# Patient Record
Sex: MALE | Race: WHITE | Employment: FULL TIME | URBAN - NONMETROPOLITAN AREA
[De-identification: names, ages, dates, MRNs, and addresses within clinical notes are randomized per-mention and may not be internally consistent; named-entity substitution may affect disease eponyms.]

---

## 2023-07-07 ENCOUNTER — HOSPITAL ENCOUNTER (INPATIENT)
Age: 28
LOS: 3 days | Discharge: HOME | DRG: 392 | End: 2023-07-10
Attending: EMERGENCY MEDICINE | Admitting: PHYSICIAN ASSISTANT
Payer: COMMERCIAL

## 2023-07-07 ENCOUNTER — APPOINTMENT (OUTPATIENT)
Dept: CT IMAGING | Age: 28
DRG: 392 | End: 2023-07-07
Payer: COMMERCIAL

## 2023-07-07 DIAGNOSIS — K57.92 ACUTE DIVERTICULITIS: Primary | ICD-10-CM

## 2023-07-07 PROBLEM — I10 PRIMARY HYPERTENSION: Status: ACTIVE | Noted: 2023-07-07

## 2023-07-07 PROBLEM — F41.9 ANXIETY: Status: ACTIVE | Noted: 2023-07-07

## 2023-07-07 PROBLEM — G90.A POSTURAL ORTHOSTATIC TACHYCARDIA SYNDROME (POTS): Status: ACTIVE | Noted: 2023-07-07

## 2023-07-07 PROBLEM — F43.10 POST TRAUMATIC STRESS DISORDER (PTSD): Status: ACTIVE | Noted: 2023-07-07

## 2023-07-07 LAB
ALBUMIN SERPL BCG-MCNC: 4.3 G/DL (ref 3.5–5.1)
ALP SERPL-CCNC: 66 U/L (ref 38–126)
ALT SERPL W/O P-5'-P-CCNC: 28 U/L (ref 11–66)
ANION GAP SERPL CALC-SCNC: 10 MEQ/L (ref 8–16)
AST SERPL-CCNC: 20 U/L (ref 5–40)
BASOPHILS ABSOLUTE: 0 THOU/MM3 (ref 0–0.1)
BASOPHILS NFR BLD AUTO: 0.4 %
BILIRUB CONJ SERPL-MCNC: < 0.2 MG/DL (ref 0–0.3)
BILIRUB SERPL-MCNC: 0.4 MG/DL (ref 0.3–1.2)
BILIRUB UR QL STRIP.AUTO: NEGATIVE
BUN SERPL-MCNC: 14 MG/DL (ref 7–22)
CALCIUM SERPL-MCNC: 9.7 MG/DL (ref 8.5–10.5)
CHARACTER UR: CLEAR
CHLORIDE SERPL-SCNC: 102 MEQ/L (ref 98–111)
CO2 SERPL-SCNC: 25 MEQ/L (ref 23–33)
COLOR: YELLOW
CREAT SERPL-MCNC: 0.8 MG/DL (ref 0.4–1.2)
CRP SERPL-MCNC: 2.04 MG/DL (ref 0–1)
DEPRECATED RDW RBC AUTO: 37.8 FL (ref 35–45)
EOSINOPHIL NFR BLD AUTO: 1 %
EOSINOPHILS ABSOLUTE: 0.1 THOU/MM3 (ref 0–0.4)
ERYTHROCYTE [DISTWIDTH] IN BLOOD BY AUTOMATED COUNT: 12.1 % (ref 11.5–14.5)
GFR SERPL CREATININE-BSD FRML MDRD: > 60 ML/MIN/1.73M2
GLUCOSE SERPL-MCNC: 95 MG/DL (ref 70–108)
GLUCOSE UR QL STRIP.AUTO: NEGATIVE MG/DL
HCT VFR BLD AUTO: 47.4 % (ref 42–52)
HGB BLD-MCNC: 16.2 GM/DL (ref 14–18)
HGB UR QL STRIP.AUTO: NEGATIVE
IMM GRANULOCYTES # BLD AUTO: 0.05 THOU/MM3 (ref 0–0.07)
IMM GRANULOCYTES NFR BLD AUTO: 0.4 %
KETONES UR QL STRIP.AUTO: NEGATIVE
LACTIC ACID, SEPSIS: 0.7 MMOL/L (ref 0.5–1.9)
LIPASE SERPL-CCNC: 20.3 U/L (ref 5.6–51.3)
LYMPHOCYTES ABSOLUTE: 2.2 THOU/MM3 (ref 1–4.8)
LYMPHOCYTES NFR BLD AUTO: 19.4 %
MCH RBC QN AUTO: 29.2 PG (ref 26–33)
MCHC RBC AUTO-ENTMCNC: 34.2 GM/DL (ref 32.2–35.5)
MCV RBC AUTO: 85.4 FL (ref 80–94)
MONOCYTES ABSOLUTE: 0.7 THOU/MM3 (ref 0.4–1.3)
MONOCYTES NFR BLD AUTO: 6 %
NEUTROPHILS NFR BLD AUTO: 72.8 %
NITRITE UR QL STRIP: NEGATIVE
NRBC BLD AUTO-RTO: 0 /100 WBC
OSMOLALITY SERPL CALC.SUM OF ELEC: 274.1 MOSMOL/KG (ref 275–300)
PH UR STRIP.AUTO: 6.5 [PH] (ref 5–9)
PLATELET # BLD AUTO: 257 THOU/MM3 (ref 130–400)
PMV BLD AUTO: 10.8 FL (ref 9.4–12.4)
POTASSIUM SERPL-SCNC: 4.6 MEQ/L (ref 3.5–5.2)
PROCALCITONIN SERPL IA-MCNC: < 0.02 NG/ML (ref 0.01–0.09)
PROT SERPL-MCNC: 7.7 G/DL (ref 6.1–8)
PROT UR STRIP.AUTO-MCNC: NEGATIVE MG/DL
RBC # BLD AUTO: 5.55 MILL/MM3 (ref 4.7–6.1)
SEGMENTED NEUTROPHILS ABSOLUTE COUNT: 8.2 THOU/MM3 (ref 1.8–7.7)
SODIUM SERPL-SCNC: 137 MEQ/L (ref 135–145)
SP GR UR REFRACT.AUTO: > 1.03 (ref 1–1.03)
UROBILINOGEN, URINE: 0.2 EU/DL (ref 0–1)
WBC # BLD AUTO: 11.3 THOU/MM3 (ref 4.8–10.8)
WBC #/AREA URNS HPF: NEGATIVE /[HPF]

## 2023-07-07 PROCEDURE — 6360000002 HC RX W HCPCS: Performed by: NURSE PRACTITIONER

## 2023-07-07 PROCEDURE — 99285 EMERGENCY DEPT VISIT HI MDM: CPT

## 2023-07-07 PROCEDURE — 86140 C-REACTIVE PROTEIN: CPT

## 2023-07-07 PROCEDURE — 99223 1ST HOSP IP/OBS HIGH 75: CPT | Performed by: NURSE PRACTITIONER

## 2023-07-07 PROCEDURE — 36415 COLL VENOUS BLD VENIPUNCTURE: CPT

## 2023-07-07 PROCEDURE — 6360000002 HC RX W HCPCS: Performed by: STUDENT IN AN ORGANIZED HEALTH CARE EDUCATION/TRAINING PROGRAM

## 2023-07-07 PROCEDURE — 1200000003 HC TELEMETRY R&B

## 2023-07-07 PROCEDURE — 96365 THER/PROPH/DIAG IV INF INIT: CPT

## 2023-07-07 PROCEDURE — 96375 TX/PRO/DX INJ NEW DRUG ADDON: CPT

## 2023-07-07 PROCEDURE — 83690 ASSAY OF LIPASE: CPT

## 2023-07-07 PROCEDURE — 83605 ASSAY OF LACTIC ACID: CPT

## 2023-07-07 PROCEDURE — 6360000004 HC RX CONTRAST MEDICATION: Performed by: STUDENT IN AN ORGANIZED HEALTH CARE EDUCATION/TRAINING PROGRAM

## 2023-07-07 PROCEDURE — 6370000000 HC RX 637 (ALT 250 FOR IP): Performed by: NURSE PRACTITIONER

## 2023-07-07 PROCEDURE — 81003 URINALYSIS AUTO W/O SCOPE: CPT

## 2023-07-07 PROCEDURE — 85025 COMPLETE CBC W/AUTO DIFF WBC: CPT

## 2023-07-07 PROCEDURE — 84145 PROCALCITONIN (PCT): CPT

## 2023-07-07 PROCEDURE — 2580000003 HC RX 258: Performed by: STUDENT IN AN ORGANIZED HEALTH CARE EDUCATION/TRAINING PROGRAM

## 2023-07-07 PROCEDURE — 2580000003 HC RX 258: Performed by: NURSE PRACTITIONER

## 2023-07-07 PROCEDURE — 80053 COMPREHEN METABOLIC PANEL: CPT

## 2023-07-07 PROCEDURE — 82248 BILIRUBIN DIRECT: CPT

## 2023-07-07 PROCEDURE — 74177 CT ABD & PELVIS W/CONTRAST: CPT

## 2023-07-07 PROCEDURE — 87040 BLOOD CULTURE FOR BACTERIA: CPT

## 2023-07-07 RX ORDER — MIDODRINE HYDROCHLORIDE 5 MG/1
10 TABLET ORAL
COMMUNITY

## 2023-07-07 RX ORDER — SODIUM CHLORIDE 0.9 % (FLUSH) 0.9 %
5-40 SYRINGE (ML) INJECTION EVERY 12 HOURS SCHEDULED
Status: DISCONTINUED | OUTPATIENT
Start: 2023-07-07 | End: 2023-07-10 | Stop reason: HOSPADM

## 2023-07-07 RX ORDER — MORPHINE SULFATE 2 MG/ML
2 INJECTION, SOLUTION INTRAMUSCULAR; INTRAVENOUS
Status: DISCONTINUED | OUTPATIENT
Start: 2023-07-07 | End: 2023-07-10

## 2023-07-07 RX ORDER — MAGNESIUM SULFATE IN WATER 40 MG/ML
2000 INJECTION, SOLUTION INTRAVENOUS PRN
Status: DISCONTINUED | OUTPATIENT
Start: 2023-07-07 | End: 2023-07-10 | Stop reason: HOSPADM

## 2023-07-07 RX ORDER — ENOXAPARIN SODIUM 100 MG/ML
30 INJECTION SUBCUTANEOUS 2 TIMES DAILY
Status: DISCONTINUED | OUTPATIENT
Start: 2023-07-07 | End: 2023-07-10 | Stop reason: HOSPADM

## 2023-07-07 RX ORDER — POLYETHYLENE GLYCOL 3350 17 G/17G
17 POWDER, FOR SOLUTION ORAL DAILY PRN
Status: DISCONTINUED | OUTPATIENT
Start: 2023-07-07 | End: 2023-07-10 | Stop reason: HOSPADM

## 2023-07-07 RX ORDER — KETOROLAC TROMETHAMINE 30 MG/ML
15 INJECTION, SOLUTION INTRAMUSCULAR; INTRAVENOUS ONCE
Status: COMPLETED | OUTPATIENT
Start: 2023-07-07 | End: 2023-07-07

## 2023-07-07 RX ORDER — ACETAMINOPHEN 650 MG/1
650 SUPPOSITORY RECTAL EVERY 6 HOURS PRN
Status: DISCONTINUED | OUTPATIENT
Start: 2023-07-07 | End: 2023-07-10 | Stop reason: HOSPADM

## 2023-07-07 RX ORDER — MORPHINE SULFATE 4 MG/ML
4 INJECTION, SOLUTION INTRAMUSCULAR; INTRAVENOUS
Status: COMPLETED | OUTPATIENT
Start: 2023-07-07 | End: 2023-07-07

## 2023-07-07 RX ORDER — SODIUM CHLORIDE, SODIUM LACTATE, POTASSIUM CHLORIDE, CALCIUM CHLORIDE 600; 310; 30; 20 MG/100ML; MG/100ML; MG/100ML; MG/100ML
INJECTION, SOLUTION INTRAVENOUS CONTINUOUS
Status: DISCONTINUED | OUTPATIENT
Start: 2023-07-07 | End: 2023-07-09

## 2023-07-07 RX ORDER — MORPHINE SULFATE 4 MG/ML
4 INJECTION, SOLUTION INTRAMUSCULAR; INTRAVENOUS
Status: DISCONTINUED | OUTPATIENT
Start: 2023-07-07 | End: 2023-07-10

## 2023-07-07 RX ORDER — FLUDROCORTISONE ACETATE 0.1 MG/1
0.1 TABLET ORAL DAILY
Status: DISCONTINUED | OUTPATIENT
Start: 2023-07-07 | End: 2023-07-10 | Stop reason: HOSPADM

## 2023-07-07 RX ORDER — SODIUM CHLORIDE 9 MG/ML
INJECTION, SOLUTION INTRAVENOUS PRN
Status: DISCONTINUED | OUTPATIENT
Start: 2023-07-07 | End: 2023-07-10 | Stop reason: HOSPADM

## 2023-07-07 RX ORDER — MIDODRINE HYDROCHLORIDE 10 MG/1
10 TABLET ORAL 2 TIMES DAILY WITH MEALS
Status: DISCONTINUED | OUTPATIENT
Start: 2023-07-07 | End: 2023-07-10 | Stop reason: HOSPADM

## 2023-07-07 RX ORDER — ONDANSETRON 2 MG/ML
4 INJECTION INTRAMUSCULAR; INTRAVENOUS EVERY 6 HOURS PRN
Status: DISCONTINUED | OUTPATIENT
Start: 2023-07-07 | End: 2023-07-10 | Stop reason: HOSPADM

## 2023-07-07 RX ORDER — VENLAFAXINE 37.5 MG/1
75 TABLET ORAL DAILY
Status: DISCONTINUED | OUTPATIENT
Start: 2023-07-08 | End: 2023-07-10 | Stop reason: HOSPADM

## 2023-07-07 RX ORDER — ONDANSETRON 4 MG/1
4 TABLET, ORALLY DISINTEGRATING ORAL EVERY 8 HOURS PRN
Status: DISCONTINUED | OUTPATIENT
Start: 2023-07-07 | End: 2023-07-10 | Stop reason: HOSPADM

## 2023-07-07 RX ORDER — ACETAMINOPHEN 325 MG/1
650 TABLET ORAL EVERY 6 HOURS PRN
Status: DISCONTINUED | OUTPATIENT
Start: 2023-07-07 | End: 2023-07-10 | Stop reason: HOSPADM

## 2023-07-07 RX ORDER — POTASSIUM CHLORIDE 7.45 MG/ML
10 INJECTION INTRAVENOUS PRN
Status: DISCONTINUED | OUTPATIENT
Start: 2023-07-07 | End: 2023-07-10 | Stop reason: HOSPADM

## 2023-07-07 RX ORDER — NICOTINE 21 MG/24HR
1 PATCH, TRANSDERMAL 24 HOURS TRANSDERMAL DAILY
Status: DISCONTINUED | OUTPATIENT
Start: 2023-07-07 | End: 2023-07-10 | Stop reason: HOSPADM

## 2023-07-07 RX ORDER — ONDANSETRON 2 MG/ML
4 INJECTION INTRAMUSCULAR; INTRAVENOUS ONCE
Status: COMPLETED | OUTPATIENT
Start: 2023-07-07 | End: 2023-07-07

## 2023-07-07 RX ORDER — FLUDROCORTISONE ACETATE 0.1 MG/1
0.1 TABLET ORAL DAILY
COMMUNITY

## 2023-07-07 RX ORDER — SODIUM CHLORIDE 0.9 % (FLUSH) 0.9 %
5-40 SYRINGE (ML) INJECTION PRN
Status: DISCONTINUED | OUTPATIENT
Start: 2023-07-07 | End: 2023-07-10 | Stop reason: HOSPADM

## 2023-07-07 RX ORDER — ARIPIPRAZOLE 10 MG/1
10 TABLET ORAL DAILY
Status: DISCONTINUED | OUTPATIENT
Start: 2023-07-08 | End: 2023-07-10 | Stop reason: HOSPADM

## 2023-07-07 RX ORDER — HYDROXYZINE 50 MG/1
50 TABLET, FILM COATED ORAL DAILY
COMMUNITY

## 2023-07-07 RX ORDER — POTASSIUM CHLORIDE 20 MEQ/1
40 TABLET, EXTENDED RELEASE ORAL PRN
Status: DISCONTINUED | OUTPATIENT
Start: 2023-07-07 | End: 2023-07-10 | Stop reason: HOSPADM

## 2023-07-07 RX ORDER — SODIUM CHLORIDE, SODIUM LACTATE, POTASSIUM CHLORIDE, AND CALCIUM CHLORIDE .6; .31; .03; .02 G/100ML; G/100ML; G/100ML; G/100ML
1000 INJECTION, SOLUTION INTRAVENOUS ONCE
Status: COMPLETED | OUTPATIENT
Start: 2023-07-07 | End: 2023-07-07

## 2023-07-07 RX ADMIN — MORPHINE SULFATE 4 MG: 4 INJECTION, SOLUTION INTRAMUSCULAR; INTRAVENOUS at 19:56

## 2023-07-07 RX ADMIN — SODIUM CHLORIDE, POTASSIUM CHLORIDE, SODIUM LACTATE AND CALCIUM CHLORIDE 1000 ML: 600; 310; 30; 20 INJECTION, SOLUTION INTRAVENOUS at 12:18

## 2023-07-07 RX ADMIN — ONDANSETRON 4 MG: 2 INJECTION INTRAMUSCULAR; INTRAVENOUS at 12:18

## 2023-07-07 RX ADMIN — PIPERACILLIN AND TAZOBACTAM 4500 MG: 4; .5 INJECTION, POWDER, LYOPHILIZED, FOR SOLUTION INTRAVENOUS at 14:00

## 2023-07-07 RX ADMIN — PIPERACILLIN AND TAZOBACTAM 3375 MG: 3; .375 INJECTION, POWDER, LYOPHILIZED, FOR SOLUTION INTRAVENOUS at 22:02

## 2023-07-07 RX ADMIN — FLUDROCORTISONE ACETATE 0.1 MG: 0.1 TABLET ORAL at 18:17

## 2023-07-07 RX ADMIN — IOPAMIDOL 80 ML: 755 INJECTION, SOLUTION INTRAVENOUS at 13:22

## 2023-07-07 RX ADMIN — MORPHINE SULFATE 4 MG: 4 INJECTION, SOLUTION INTRAMUSCULAR; INTRAVENOUS at 23:17

## 2023-07-07 RX ADMIN — MIDODRINE HYDROCHLORIDE 10 MG: 10 TABLET ORAL at 18:17

## 2023-07-07 RX ADMIN — MORPHINE SULFATE 2 MG: 2 INJECTION, SOLUTION INTRAMUSCULAR; INTRAVENOUS at 17:18

## 2023-07-07 RX ADMIN — MORPHINE SULFATE 4 MG: 4 INJECTION, SOLUTION INTRAMUSCULAR; INTRAVENOUS at 12:18

## 2023-07-07 RX ADMIN — SODIUM CHLORIDE, POTASSIUM CHLORIDE, SODIUM LACTATE AND CALCIUM CHLORIDE: 600; 310; 30; 20 INJECTION, SOLUTION INTRAVENOUS at 17:16

## 2023-07-07 RX ADMIN — SODIUM CHLORIDE, PRESERVATIVE FREE 10 ML: 5 INJECTION INTRAVENOUS at 21:59

## 2023-07-07 RX ADMIN — KETOROLAC TROMETHAMINE 15 MG: 30 INJECTION, SOLUTION INTRAMUSCULAR; INTRAVENOUS at 12:18

## 2023-07-07 ASSESSMENT — PAIN DESCRIPTION - ONSET: ONSET: ON-GOING

## 2023-07-07 ASSESSMENT — PAIN SCALES - GENERAL
PAINLEVEL_OUTOF10: 4
PAINLEVEL_OUTOF10: 4
PAINLEVEL_OUTOF10: 5
PAINLEVEL_OUTOF10: 7

## 2023-07-07 ASSESSMENT — PAIN DESCRIPTION - LOCATION
LOCATION: ABDOMEN
LOCATION: ABDOMEN;BACK
LOCATION: ABDOMEN
LOCATION: ABDOMEN

## 2023-07-07 ASSESSMENT — PAIN DESCRIPTION - PAIN TYPE: TYPE: ACUTE PAIN

## 2023-07-07 ASSESSMENT — PAIN DESCRIPTION - DESCRIPTORS
DESCRIPTORS: ACHING;STABBING
DESCRIPTORS: SHARP;ACHING
DESCRIPTORS: ACHING;DISCOMFORT

## 2023-07-07 ASSESSMENT — PAIN DESCRIPTION - ORIENTATION
ORIENTATION: RIGHT;LEFT
ORIENTATION: LEFT
ORIENTATION: OTHER (COMMENT)

## 2023-07-07 ASSESSMENT — PAIN DESCRIPTION - FREQUENCY: FREQUENCY: CONTINUOUS

## 2023-07-07 ASSESSMENT — PAIN DESCRIPTION - DIRECTION: RADIATING_TOWARDS: LOWER LEFT BACK

## 2023-07-08 LAB
ANION GAP SERPL CALC-SCNC: 13 MEQ/L (ref 8–16)
BASOPHILS ABSOLUTE: 0.1 THOU/MM3 (ref 0–0.1)
BASOPHILS NFR BLD AUTO: 0.7 %
BUN SERPL-MCNC: 14 MG/DL (ref 7–22)
CALCIUM SERPL-MCNC: 9.2 MG/DL (ref 8.5–10.5)
CHLORIDE SERPL-SCNC: 102 MEQ/L (ref 98–111)
CO2 SERPL-SCNC: 25 MEQ/L (ref 23–33)
CREAT SERPL-MCNC: 0.9 MG/DL (ref 0.4–1.2)
DEPRECATED RDW RBC AUTO: 39.1 FL (ref 35–45)
EOSINOPHIL NFR BLD AUTO: 2.3 %
EOSINOPHILS ABSOLUTE: 0.2 THOU/MM3 (ref 0–0.4)
ERYTHROCYTE [DISTWIDTH] IN BLOOD BY AUTOMATED COUNT: 12.2 % (ref 11.5–14.5)
GFR SERPL CREATININE-BSD FRML MDRD: > 60 ML/MIN/1.73M2
GLUCOSE SERPL-MCNC: 70 MG/DL (ref 70–108)
HCT VFR BLD AUTO: 42.2 % (ref 42–52)
HGB BLD-MCNC: 14 GM/DL (ref 14–18)
IMM GRANULOCYTES # BLD AUTO: 0.05 THOU/MM3 (ref 0–0.07)
IMM GRANULOCYTES NFR BLD AUTO: 0.6 %
LYMPHOCYTES ABSOLUTE: 2.8 THOU/MM3 (ref 1–4.8)
LYMPHOCYTES NFR BLD AUTO: 33.1 %
MCH RBC QN AUTO: 29 PG (ref 26–33)
MCHC RBC AUTO-ENTMCNC: 33.2 GM/DL (ref 32.2–35.5)
MCV RBC AUTO: 87.4 FL (ref 80–94)
MONOCYTES ABSOLUTE: 0.7 THOU/MM3 (ref 0.4–1.3)
MONOCYTES NFR BLD AUTO: 8.4 %
NEUTROPHILS NFR BLD AUTO: 54.9 %
NRBC BLD AUTO-RTO: 0 /100 WBC
PLATELET # BLD AUTO: 216 THOU/MM3 (ref 130–400)
PMV BLD AUTO: 11.3 FL (ref 9.4–12.4)
POTASSIUM SERPL-SCNC: 4.2 MEQ/L (ref 3.5–5.2)
RBC # BLD AUTO: 4.83 MILL/MM3 (ref 4.7–6.1)
SEGMENTED NEUTROPHILS ABSOLUTE COUNT: 4.7 THOU/MM3 (ref 1.8–7.7)
SODIUM SERPL-SCNC: 140 MEQ/L (ref 135–145)
WBC # BLD AUTO: 8.6 THOU/MM3 (ref 4.8–10.8)

## 2023-07-08 PROCEDURE — 99232 SBSQ HOSP IP/OBS MODERATE 35: CPT | Performed by: PHYSICIAN ASSISTANT

## 2023-07-08 PROCEDURE — 6370000000 HC RX 637 (ALT 250 FOR IP): Performed by: NURSE PRACTITIONER

## 2023-07-08 PROCEDURE — 2580000003 HC RX 258: Performed by: NURSE PRACTITIONER

## 2023-07-08 PROCEDURE — 1200000003 HC TELEMETRY R&B

## 2023-07-08 PROCEDURE — 6360000002 HC RX W HCPCS: Performed by: NURSE PRACTITIONER

## 2023-07-08 PROCEDURE — 36415 COLL VENOUS BLD VENIPUNCTURE: CPT

## 2023-07-08 PROCEDURE — 80048 BASIC METABOLIC PNL TOTAL CA: CPT

## 2023-07-08 PROCEDURE — 85025 COMPLETE CBC W/AUTO DIFF WBC: CPT

## 2023-07-08 RX ADMIN — ACETAMINOPHEN 650 MG: 325 TABLET ORAL at 21:02

## 2023-07-08 RX ADMIN — MORPHINE SULFATE 4 MG: 4 INJECTION, SOLUTION INTRAMUSCULAR; INTRAVENOUS at 06:49

## 2023-07-08 RX ADMIN — FLUDROCORTISONE ACETATE 0.1 MG: 0.1 TABLET ORAL at 10:38

## 2023-07-08 RX ADMIN — MORPHINE SULFATE 4 MG: 4 INJECTION, SOLUTION INTRAMUSCULAR; INTRAVENOUS at 02:19

## 2023-07-08 RX ADMIN — VENLAFAXINE 75 MG: 37.5 TABLET ORAL at 10:38

## 2023-07-08 RX ADMIN — MORPHINE SULFATE 4 MG: 4 INJECTION, SOLUTION INTRAMUSCULAR; INTRAVENOUS at 21:02

## 2023-07-08 RX ADMIN — MORPHINE SULFATE 4 MG: 4 INJECTION, SOLUTION INTRAMUSCULAR; INTRAVENOUS at 10:19

## 2023-07-08 RX ADMIN — MORPHINE SULFATE 4 MG: 4 INJECTION, SOLUTION INTRAMUSCULAR; INTRAVENOUS at 12:44

## 2023-07-08 RX ADMIN — MIDODRINE HYDROCHLORIDE 10 MG: 10 TABLET ORAL at 10:38

## 2023-07-08 RX ADMIN — SODIUM CHLORIDE, PRESERVATIVE FREE 10 ML: 5 INJECTION INTRAVENOUS at 21:09

## 2023-07-08 RX ADMIN — PIPERACILLIN AND TAZOBACTAM 3375 MG: 3; .375 INJECTION, POWDER, LYOPHILIZED, FOR SOLUTION INTRAVENOUS at 20:36

## 2023-07-08 RX ADMIN — PIPERACILLIN AND TAZOBACTAM 3375 MG: 3; .375 INJECTION, POWDER, LYOPHILIZED, FOR SOLUTION INTRAVENOUS at 06:51

## 2023-07-08 RX ADMIN — MORPHINE SULFATE 4 MG: 4 INJECTION, SOLUTION INTRAMUSCULAR; INTRAVENOUS at 18:39

## 2023-07-08 RX ADMIN — PIPERACILLIN AND TAZOBACTAM 3375 MG: 3; .375 INJECTION, POWDER, LYOPHILIZED, FOR SOLUTION INTRAVENOUS at 12:49

## 2023-07-08 RX ADMIN — MORPHINE SULFATE 4 MG: 4 INJECTION, SOLUTION INTRAMUSCULAR; INTRAVENOUS at 15:43

## 2023-07-08 RX ADMIN — ARIPIPRAZOLE 10 MG: 10 TABLET ORAL at 10:38

## 2023-07-08 RX ADMIN — ONDANSETRON 4 MG: 2 INJECTION INTRAMUSCULAR; INTRAVENOUS at 21:02

## 2023-07-08 RX ADMIN — MIDODRINE HYDROCHLORIDE 10 MG: 10 TABLET ORAL at 18:40

## 2023-07-08 ASSESSMENT — PAIN DESCRIPTION - ORIENTATION
ORIENTATION: LOWER;LEFT;RIGHT;MID
ORIENTATION: RIGHT;ANTERIOR;LEFT

## 2023-07-08 ASSESSMENT — PAIN SCALES - GENERAL
PAINLEVEL_OUTOF10: 7
PAINLEVEL_OUTOF10: 5
PAINLEVEL_OUTOF10: 9
PAINLEVEL_OUTOF10: 9
PAINLEVEL_OUTOF10: 8
PAINLEVEL_OUTOF10: 7
PAINLEVEL_OUTOF10: 7
PAINLEVEL_OUTOF10: 6
PAINLEVEL_OUTOF10: 7
PAINLEVEL_OUTOF10: 7

## 2023-07-08 ASSESSMENT — PAIN DESCRIPTION - LOCATION
LOCATION: ABDOMEN

## 2023-07-08 ASSESSMENT — PAIN DESCRIPTION - DESCRIPTORS
DESCRIPTORS: ACHING;SHARP
DESCRIPTORS: ACHING;SHARP

## 2023-07-08 ASSESSMENT — PAIN - FUNCTIONAL ASSESSMENT
PAIN_FUNCTIONAL_ASSESSMENT: ACTIVITIES ARE NOT PREVENTED
PAIN_FUNCTIONAL_ASSESSMENT: ACTIVITIES ARE NOT PREVENTED

## 2023-07-08 ASSESSMENT — PAIN DESCRIPTION - PAIN TYPE: TYPE: ACUTE PAIN

## 2023-07-08 ASSESSMENT — PAIN DESCRIPTION - FREQUENCY: FREQUENCY: CONTINUOUS

## 2023-07-09 PROCEDURE — 1200000003 HC TELEMETRY R&B

## 2023-07-09 PROCEDURE — 2580000003 HC RX 258: Performed by: NURSE PRACTITIONER

## 2023-07-09 PROCEDURE — 99232 SBSQ HOSP IP/OBS MODERATE 35: CPT | Performed by: PHYSICIAN ASSISTANT

## 2023-07-09 PROCEDURE — 6370000000 HC RX 637 (ALT 250 FOR IP): Performed by: PHYSICIAN ASSISTANT

## 2023-07-09 PROCEDURE — 2580000003 HC RX 258

## 2023-07-09 PROCEDURE — 6370000000 HC RX 637 (ALT 250 FOR IP): Performed by: NURSE PRACTITIONER

## 2023-07-09 PROCEDURE — 6360000002 HC RX W HCPCS: Performed by: NURSE PRACTITIONER

## 2023-07-09 RX ORDER — SODIUM CHLORIDE 9 MG/ML
INJECTION, SOLUTION INTRAVENOUS CONTINUOUS
Status: DISCONTINUED | OUTPATIENT
Start: 2023-07-09 | End: 2023-07-10 | Stop reason: HOSPADM

## 2023-07-09 RX ORDER — OXYCODONE HYDROCHLORIDE 5 MG/1
10 TABLET ORAL EVERY 4 HOURS PRN
Status: DISCONTINUED | OUTPATIENT
Start: 2023-07-09 | End: 2023-07-10

## 2023-07-09 RX ORDER — OXYCODONE HYDROCHLORIDE 5 MG/1
5 TABLET ORAL EVERY 4 HOURS PRN
Status: DISCONTINUED | OUTPATIENT
Start: 2023-07-09 | End: 2023-07-10

## 2023-07-09 RX ADMIN — PIPERACILLIN AND TAZOBACTAM 3375 MG: 3; .375 INJECTION, POWDER, LYOPHILIZED, FOR SOLUTION INTRAVENOUS at 20:22

## 2023-07-09 RX ADMIN — OXYCODONE HYDROCHLORIDE 10 MG: 5 TABLET ORAL at 20:20

## 2023-07-09 RX ADMIN — SODIUM CHLORIDE: 9 INJECTION, SOLUTION INTRAVENOUS at 04:28

## 2023-07-09 RX ADMIN — MORPHINE SULFATE 4 MG: 4 INJECTION, SOLUTION INTRAMUSCULAR; INTRAVENOUS at 04:39

## 2023-07-09 RX ADMIN — SODIUM CHLORIDE: 9 INJECTION, SOLUTION INTRAVENOUS at 20:26

## 2023-07-09 RX ADMIN — MORPHINE SULFATE 4 MG: 4 INJECTION, SOLUTION INTRAMUSCULAR; INTRAVENOUS at 00:36

## 2023-07-09 RX ADMIN — MORPHINE SULFATE 4 MG: 4 INJECTION, SOLUTION INTRAMUSCULAR; INTRAVENOUS at 07:36

## 2023-07-09 RX ADMIN — SODIUM CHLORIDE: 9 INJECTION, SOLUTION INTRAVENOUS at 09:36

## 2023-07-09 RX ADMIN — VENLAFAXINE 75 MG: 37.5 TABLET ORAL at 07:37

## 2023-07-09 RX ADMIN — PIPERACILLIN AND TAZOBACTAM 3375 MG: 3; .375 INJECTION, POWDER, LYOPHILIZED, FOR SOLUTION INTRAVENOUS at 12:02

## 2023-07-09 RX ADMIN — OXYCODONE HYDROCHLORIDE 10 MG: 5 TABLET ORAL at 15:37

## 2023-07-09 RX ADMIN — MIDODRINE HYDROCHLORIDE 10 MG: 10 TABLET ORAL at 07:36

## 2023-07-09 RX ADMIN — MIDODRINE HYDROCHLORIDE 10 MG: 10 TABLET ORAL at 15:37

## 2023-07-09 RX ADMIN — FLUDROCORTISONE ACETATE 0.1 MG: 0.1 TABLET ORAL at 07:37

## 2023-07-09 RX ADMIN — PIPERACILLIN AND TAZOBACTAM 3375 MG: 3; .375 INJECTION, POWDER, LYOPHILIZED, FOR SOLUTION INTRAVENOUS at 04:43

## 2023-07-09 RX ADMIN — OXYCODONE HYDROCHLORIDE 10 MG: 5 TABLET ORAL at 11:32

## 2023-07-09 RX ADMIN — ARIPIPRAZOLE 10 MG: 10 TABLET ORAL at 07:37

## 2023-07-09 RX ADMIN — OXYCODONE HYDROCHLORIDE 10 MG: 5 TABLET ORAL at 23:54

## 2023-07-09 ASSESSMENT — PAIN SCALES - GENERAL
PAINLEVEL_OUTOF10: 7
PAINLEVEL_OUTOF10: 4
PAINLEVEL_OUTOF10: 7
PAINLEVEL_OUTOF10: 8
PAINLEVEL_OUTOF10: 7
PAINLEVEL_OUTOF10: 0
PAINLEVEL_OUTOF10: 7
PAINLEVEL_OUTOF10: 7
PAINLEVEL_OUTOF10: 0

## 2023-07-09 ASSESSMENT — PAIN DESCRIPTION - ONSET: ONSET: AWAKENED FROM SLEEP

## 2023-07-09 ASSESSMENT — PAIN DESCRIPTION - LOCATION
LOCATION: ABDOMEN

## 2023-07-09 ASSESSMENT — PAIN DESCRIPTION - FREQUENCY
FREQUENCY: CONTINUOUS

## 2023-07-09 ASSESSMENT — PAIN DESCRIPTION - DESCRIPTORS
DESCRIPTORS: ACHING
DESCRIPTORS: ACHING
DESCRIPTORS: ACHING;CRAMPING

## 2023-07-09 ASSESSMENT — PAIN DESCRIPTION - PAIN TYPE
TYPE: ACUTE PAIN

## 2023-07-09 ASSESSMENT — PAIN DESCRIPTION - ORIENTATION
ORIENTATION: RIGHT;LEFT;ANTERIOR
ORIENTATION: RIGHT;LEFT;MID
ORIENTATION: RIGHT;LEFT;ANTERIOR

## 2023-07-09 ASSESSMENT — PAIN DESCRIPTION - DIRECTION: RADIATING_TOWARDS: BACK

## 2023-07-10 VITALS
HEIGHT: 71 IN | BODY MASS INDEX: 33.6 KG/M2 | OXYGEN SATURATION: 95 % | TEMPERATURE: 97.7 F | RESPIRATION RATE: 16 BRPM | WEIGHT: 240 LBS | HEART RATE: 62 BPM | DIASTOLIC BLOOD PRESSURE: 59 MMHG | SYSTOLIC BLOOD PRESSURE: 105 MMHG

## 2023-07-10 PROCEDURE — 6370000000 HC RX 637 (ALT 250 FOR IP): Performed by: NURSE PRACTITIONER

## 2023-07-10 PROCEDURE — 2580000003 HC RX 258: Performed by: NURSE PRACTITIONER

## 2023-07-10 PROCEDURE — 6370000000 HC RX 637 (ALT 250 FOR IP): Performed by: PHYSICIAN ASSISTANT

## 2023-07-10 PROCEDURE — 6360000002 HC RX W HCPCS: Performed by: NURSE PRACTITIONER

## 2023-07-10 PROCEDURE — 2580000003 HC RX 258

## 2023-07-10 RX ORDER — AMOXICILLIN AND CLAVULANATE POTASSIUM 875; 125 MG/1; MG/1
1 TABLET, FILM COATED ORAL 2 TIMES DAILY
Qty: 14 TABLET | Refills: 0 | Status: SHIPPED | OUTPATIENT
Start: 2023-07-10 | End: 2023-07-17

## 2023-07-10 RX ORDER — OXYCODONE HYDROCHLORIDE 5 MG/1
10 TABLET ORAL EVERY 6 HOURS PRN
Status: DISCONTINUED | OUTPATIENT
Start: 2023-07-10 | End: 2023-07-10 | Stop reason: HOSPADM

## 2023-07-10 RX ORDER — NICOTINE 21 MG/24HR
1 PATCH, TRANSDERMAL 24 HOURS TRANSDERMAL DAILY
Qty: 30 PATCH | Refills: 3 | Status: SHIPPED | OUTPATIENT
Start: 2023-07-11

## 2023-07-10 RX ORDER — OXYCODONE HYDROCHLORIDE 5 MG/1
5 TABLET ORAL EVERY 6 HOURS PRN
Qty: 12 TABLET | Refills: 0 | Status: SHIPPED | OUTPATIENT
Start: 2023-07-10 | End: 2023-07-13

## 2023-07-10 RX ORDER — OXYCODONE HYDROCHLORIDE 5 MG/1
5 TABLET ORAL EVERY 6 HOURS PRN
Status: DISCONTINUED | OUTPATIENT
Start: 2023-07-10 | End: 2023-07-10 | Stop reason: HOSPADM

## 2023-07-10 RX ORDER — ONDANSETRON 4 MG/1
4 TABLET, ORALLY DISINTEGRATING ORAL EVERY 8 HOURS PRN
Qty: 15 TABLET | Refills: 0 | Status: SHIPPED | OUTPATIENT
Start: 2023-07-10

## 2023-07-10 RX ADMIN — VENLAFAXINE 75 MG: 37.5 TABLET ORAL at 08:10

## 2023-07-10 RX ADMIN — ARIPIPRAZOLE 10 MG: 10 TABLET ORAL at 08:10

## 2023-07-10 RX ADMIN — OXYCODONE HYDROCHLORIDE 10 MG: 5 TABLET ORAL at 04:25

## 2023-07-10 RX ADMIN — PIPERACILLIN AND TAZOBACTAM 3375 MG: 3; .375 INJECTION, POWDER, LYOPHILIZED, FOR SOLUTION INTRAVENOUS at 11:42

## 2023-07-10 RX ADMIN — PIPERACILLIN AND TAZOBACTAM 3375 MG: 3; .375 INJECTION, POWDER, LYOPHILIZED, FOR SOLUTION INTRAVENOUS at 04:23

## 2023-07-10 RX ADMIN — MIDODRINE HYDROCHLORIDE 10 MG: 10 TABLET ORAL at 08:10

## 2023-07-10 RX ADMIN — ACETAMINOPHEN 650 MG: 325 TABLET ORAL at 11:43

## 2023-07-10 RX ADMIN — SODIUM CHLORIDE: 9 INJECTION, SOLUTION INTRAVENOUS at 04:28

## 2023-07-10 RX ADMIN — OXYCODONE HYDROCHLORIDE 10 MG: 5 TABLET ORAL at 08:10

## 2023-07-10 RX ADMIN — FLUDROCORTISONE ACETATE 0.1 MG: 0.1 TABLET ORAL at 08:10

## 2023-07-10 RX ADMIN — OXYCODONE HYDROCHLORIDE 10 MG: 5 TABLET ORAL at 14:42

## 2023-07-10 ASSESSMENT — PAIN SCALES - GENERAL
PAINLEVEL_OUTOF10: 7
PAINLEVEL_OUTOF10: 6
PAINLEVEL_OUTOF10: 3
PAINLEVEL_OUTOF10: 7
PAINLEVEL_OUTOF10: 5
PAINLEVEL_OUTOF10: 3
PAINLEVEL_OUTOF10: 5
PAINLEVEL_OUTOF10: 7

## 2023-07-10 ASSESSMENT — PAIN DESCRIPTION - PAIN TYPE: TYPE: ACUTE PAIN

## 2023-07-10 ASSESSMENT — PAIN DESCRIPTION - LOCATION
LOCATION: ABDOMEN

## 2023-07-10 ASSESSMENT — PAIN DESCRIPTION - DESCRIPTORS
DESCRIPTORS: ACHING
DESCRIPTORS: ACHING

## 2023-07-10 ASSESSMENT — PAIN - FUNCTIONAL ASSESSMENT: PAIN_FUNCTIONAL_ASSESSMENT: ACTIVITIES ARE NOT PREVENTED

## 2023-07-10 ASSESSMENT — PAIN DESCRIPTION - ORIENTATION
ORIENTATION: RIGHT;LEFT
ORIENTATION: LEFT;ANTERIOR

## 2023-07-10 ASSESSMENT — PAIN DESCRIPTION - FREQUENCY: FREQUENCY: CONTINUOUS

## 2023-07-10 NOTE — DISCHARGE INSTRUCTIONS
Maintain a low fiber diet for 5 weeks then transition to a high fiber diet    [facility] will mail colonoscopy instructions to your house

## 2023-07-10 NOTE — PROGRESS NOTES
Discharge instructions given to patient and he verbalizes understanding of instructions, medications,diet and follow up appointments

## 2023-07-10 NOTE — DISCHARGE SUMMARY
Hospital Medicine Discharge Summary      Patient Identification:   Aaron Daly   : 1995  MRN: [id number]   Account: [de-identified]      Patient's PCP: Johnnie DAWKINS    Admit Date: 2023     Discharge Date: 7/10/23    Admitting Physician: Sarah Hurst PA-C     Discharge Physician: Sarah Hurst PA-C     Aaron Daly is a 33 y.o. male admitted to [facility] on 2023. HPI On Admission From H&P:    Aaron Daly is a 33 y.o. male with PMHx that includes: POTS, PTSD, depression, vapes who presented to [facility] with chief complaint of Left lower abdominal/groin pain. Patient is alert, oriented x3, no acute distress. Patient states he developed a slight discomfort in this left lower abdomen/groin region about 4 days ago. Abdominal pain described as a sharp ache, fairly constant but exacerbates with movement. Patient was evaluated  at [facility] where CT A/P was significant for acute sigmoid diverticulitis. Discharged home on antibiotics and outpatient GI followup. Patient reports LLQ abdominal pain worsened overnight despite Norco and now also has pain in left flank region hence re-presentation to ED. Denies c/o fever, chills, chest pain, cough, shortness of breath, palpitations, dysuria. Patient endorses poor appetite and nausea but no emesis, states he passed some \"kareem\" last night otherwise last normal bowel movement was Tuesday. Denies melena or hematochezia. \"    Assessment/Plan With Discharge Diagnoses:    Acute Diverticulitis:              -GI consulted, following              -Continue IV Zosyn              -Continue analgesics and antiemetics as needed              -GI plan for outpatient colonoscopy              -Diet advanced from clears to soft yesterday, diet advanced to regular     Discharged with Augmentin, Oxycodone for analgesia, Zofran for antiemetic benefit, and nicotine patch.     POTS:              -Ok to d/c telemetry              -Continue

## 2023-07-10 NOTE — CARE COORDINATION
Case Management Assessment  Initial Evaluation    Date/Time of Evaluation: 7/10/2023 2:55 PM  Assessment Completed by: Norma June RN    If patient is discharged prior to next notation, then this note serves as note for discharge by case management. Patient Name: Omaira Crowder                   YOB: 1995  Diagnosis: Acute diverticulitis [K57.92]                   Date / Time: 7/7/2023 11:29 AM  Location: [address] [place] [address]     Patient Admission Status: Inpatient   Readmission Risk Low 0-14, Mod 15-19), High > 20: Readmission Risk Score: 5.1    Current PCP: Charline DAWKINS  PCP verified by CM? Yes    Chart Reviewed: Yes      History Provided by: Patient  Patient Orientation: Alert and Oriented    Patient Cognition: Alert    Hospitalization in the last 30 days (Readmission):  No    If yes, Readmission Assessment in CM Navigator will be completed. Advance Directives:      Code Status: Full Code   Patient's Primary Decision Maker is: Patient Declined (Legal Next of Kin Remains as Decision Maker)      Discharge Planning:    Patient lives with: Spouse/Significant Other Type of Home: House  Primary Care Giver: Self  Patient Support Systems include: Spouse/Significant Other   Current Financial resources: Medicaid  Current community resources: None  Current services prior to admission: None            Current DME:              Type of Home Care services:  None    ADLS  Prior functional level: Independent in ADLs/IADLs  Current functional level: Independent in ADLs/IADLs    Family can provide assistance at DC: Yes  Would you like Case Management to discuss the discharge plan with any other family members/significant others, and if so, who?  No  Plans to Return to Present Housing: Yes  Other Identified Issues/Barriers to RETURNING to current housing: None  Potential Assistance needed at discharge: N/A            Potential DME:    Patient expects to discharge to: [address] [place] [address] for transportation at discharge:

## 2023-07-10 NOTE — PLAN OF CARE
Problem: Safety - Adult  Goal: Free from fall injury  Outcome: Progressing     Problem: Pain  Goal: Verbalizes/displays adequate comfort level or baseline comfort level  Outcome: Progressing     Problem: Gastrointestinal - Adult  Goal: Minimal or absence of nausea and vomiting  Outcome: Progressing  Goal: Maintains or returns to baseline bowel function  Outcome: Progressing  Goal: Maintains adequate nutritional intake  Outcome: Progressing  Goal: Establish and maintain optimal ostomy function  Outcome: Progressing     Problem: Infection - Adult  Goal: Absence of infection at discharge  Outcome: Progressing  Goal: Absence of infection during hospitalization  Outcome: Progressing   Patient is ambulatory, anticipates a discharge soon, tolerates PO pain methods for pain control and currently able to void to urinal with x1 B/M this past shift

## 2023-07-10 NOTE — PLAN OF CARE
Problem: Safety - Adult  Goal: Free from fall injury  7/10/2023 1132 by Mauricio Martinez RN  Outcome: Progressing  Flowsheets (Taken 7/10/2023 1132)  Free From Fall Injury:   Linette Parks family/caregiver on patient safety   Based on caregiver fall risk screen, instruct family/caregiver to ask for assistance with transferring infant if caregiver noted to have fall risk factors     Problem: Pain  Goal: Verbalizes/displays adequate comfort level or baseline comfort level  7/10/2023 1132 by Mauricio Martinez RN  Outcome: Progressing  Flowsheets (Taken 7/10/2023 1132)  Verbalizes/displays adequate comfort level or baseline comfort level:   Encourage patient to monitor pain and request assistance   Assess pain using appropriate pain scale   Administer analgesics based on type and severity of pain and evaluate response   Implement non-pharmacological measures as appropriate and evaluate response   Consider cultural and social influences on pain and pain management   Notify Licensed Independent Practitioner if interventions unsuccessful or patient reports new pain     Problem: Gastrointestinal - Adult  Goal: Minimal or absence of nausea and vomiting  7/10/2023 1132 by Mauricio Martinez RN  Outcome: Progressing  Flowsheets (Taken 7/9/2023 2018 by Royal Price, RN)  Minimal or absence of nausea and vomiting: Administer IV fluids as ordered to ensure adequate hydration     Problem: Gastrointestinal - Adult  Goal: Maintains or returns to baseline bowel function  7/10/2023 1132 by Mauricio Martinez RN  Outcome: Ricky Hoff (Taken 7/9/2023 2018 by Royal Price, RN)  Maintains or returns to baseline bowel function: Assess bowel function     Problem: Gastrointestinal - Adult  Goal: Maintains adequate nutritional intake  7/10/2023 1132 by Mauricio Martinez RN  Outcome: Progressing  Flowsheets (Taken 7/9/2023 2018 by Royal Price, RN)  Maintains adequate nutritional intake: Monitor percentage of each

## 2023-07-12 LAB
BACTERIA BLD AEROBE CULT: NORMAL
BACTERIA BLD AEROBE CULT: NORMAL

## 2023-08-27 NOTE — H&P
[address]  Sedation/Analgesia History & Physical    Patient: Summer Boyd : 1995  Med Rec#: [id number] Acc#: [id number]   Provider Performing Procedure: Ousmane Sarah MD  Primary Care Physician: Bryn DAWKINS    PRE-PROCEDURE   Brief History/Pre-Procedure Diagnosis:The patient is a 33 y.o.,  male with significant past medical history of diverticulitis. MEDICAL HISTORY  []CAD/Valve  []Liver Disease  []Lung Disease []Diabetes  []Hypertension []Renal Disease  [x]Additional information:       has a past medical history of Anxiety, Post traumatic stress disorder (PTSD), Postural orthostatic tachycardia syndrome (POTS), Primary hypertension, and Vapes nicotine containing substance. SURGICAL HISTORY   has a past surgical history that includes Vasectomy and lipoma resection (N/A). Additional information:       ALLERGIES   Allergies as of 2023    (No Known Allergies)     Additional information:       MEDICATIONS       Current Facility-Administered Medications:     sodium chloride flush 0.9 % injection 5-40 mL, 5-40 mL, IntraVENous, 2 times per day, Leandra Ely MD    sodium chloride flush 0.9 % injection 5-40 mL, 5-40 mL, IntraVENous, PRN, Leandra Ely MD    0.9 % sodium chloride infusion, 25 mL, IntraVENous, PRN, Leandra Ely MD    lactated ringers IV soln infusion, , IntraVENous, Continuous, Leandra Ely MD, Last Rate: 75 mL/hr at 23 1144, New Bag at 23 1144  Prior to Admission medications    Medication Sig Start Date End Date Taking?  Authorizing Provider   traZODone (DESYREL) 50 MG tablet Take 1 tablet by mouth nightly   Yes Historical Provider, MD   ondansetron (ZOFRAN-ODT) 4 MG disintegrating tablet Take 1 tablet by mouth every 8 hours as needed for Nausea or Vomiting 7/10/23   Naheed Hernandez PA-C   nicotine (NICODERM CQ) 14 MG/24HR Place 1 patch onto the skin daily  Patient not taking: Reported on 2023   Naheed Hernandez

## 2023-08-27 NOTE — PROCEDURES
Post-Sedation Exam: Lungs: clear to auscultation bilaterally and Cardiovascular: regular rate and rhythm           Complications: none

## 2023-08-30 ENCOUNTER — ANESTHESIA (OUTPATIENT)
Dept: ENDOSCOPY | Age: 28
End: 2023-08-30
Payer: COMMERCIAL

## 2023-08-30 ENCOUNTER — ANESTHESIA EVENT (OUTPATIENT)
Dept: ENDOSCOPY | Age: 28
End: 2023-08-30
Payer: COMMERCIAL

## 2023-08-30 ENCOUNTER — HOSPITAL ENCOUNTER (OUTPATIENT)
Age: 28
Setting detail: OUTPATIENT SURGERY
Discharge: HOME | End: 2023-08-30
Attending: INTERNAL MEDICINE | Admitting: INTERNAL MEDICINE
Payer: COMMERCIAL

## 2023-08-30 VITALS
HEIGHT: 71 IN | WEIGHT: 250 LBS | HEART RATE: 60 BPM | OXYGEN SATURATION: 94 % | DIASTOLIC BLOOD PRESSURE: 78 MMHG | TEMPERATURE: 96.6 F | BODY MASS INDEX: 35 KG/M2 | SYSTOLIC BLOOD PRESSURE: 128 MMHG | RESPIRATION RATE: 18 BRPM

## 2023-08-30 PROCEDURE — 2580000003 HC RX 258: Performed by: INTERNAL MEDICINE

## 2023-08-30 PROCEDURE — 3700000000 HC ANESTHESIA ATTENDED CARE: Performed by: INTERNAL MEDICINE

## 2023-08-30 PROCEDURE — 2580000003 HC RX 258: Performed by: NURSE ANESTHETIST, CERTIFIED REGISTERED

## 2023-08-30 PROCEDURE — 6360000002 HC RX W HCPCS: Performed by: NURSE ANESTHETIST, CERTIFIED REGISTERED

## 2023-08-30 PROCEDURE — 3609027000 HC COLONOSCOPY: Performed by: INTERNAL MEDICINE

## 2023-08-30 PROCEDURE — 2500000003 HC RX 250 WO HCPCS: Performed by: NURSE ANESTHETIST, CERTIFIED REGISTERED

## 2023-08-30 PROCEDURE — 7100000011 HC PHASE II RECOVERY - ADDTL 15 MIN: Performed by: INTERNAL MEDICINE

## 2023-08-30 PROCEDURE — 3700000001 HC ADD 15 MINUTES (ANESTHESIA): Performed by: INTERNAL MEDICINE

## 2023-08-30 PROCEDURE — 2709999900 HC NON-CHARGEABLE SUPPLY: Performed by: INTERNAL MEDICINE

## 2023-08-30 PROCEDURE — 7100000010 HC PHASE II RECOVERY - FIRST 15 MIN: Performed by: INTERNAL MEDICINE

## 2023-08-30 RX ORDER — PROPOFOL 10 MG/ML
INJECTION, EMULSION INTRAVENOUS PRN
Status: DISCONTINUED | OUTPATIENT
Start: 2023-08-30 | End: 2023-08-30 | Stop reason: SDUPTHER

## 2023-08-30 RX ORDER — SODIUM CHLORIDE 0.9 % (FLUSH) 0.9 %
5-40 SYRINGE (ML) INJECTION PRN
Status: DISCONTINUED | OUTPATIENT
Start: 2023-08-30 | End: 2023-08-30 | Stop reason: HOSPADM

## 2023-08-30 RX ORDER — SODIUM CHLORIDE, SODIUM LACTATE, POTASSIUM CHLORIDE, CALCIUM CHLORIDE 600; 310; 30; 20 MG/100ML; MG/100ML; MG/100ML; MG/100ML
INJECTION, SOLUTION INTRAVENOUS CONTINUOUS
Status: DISCONTINUED | OUTPATIENT
Start: 2023-08-30 | End: 2023-08-30 | Stop reason: HOSPADM

## 2023-08-30 RX ORDER — SODIUM CHLORIDE 9 MG/ML
25 INJECTION, SOLUTION INTRAVENOUS PRN
Status: DISCONTINUED | OUTPATIENT
Start: 2023-08-30 | End: 2023-08-30 | Stop reason: HOSPADM

## 2023-08-30 RX ORDER — LIDOCAINE HYDROCHLORIDE 20 MG/ML
INJECTION, SOLUTION INFILTRATION; PERINEURAL PRN
Status: DISCONTINUED | OUTPATIENT
Start: 2023-08-30 | End: 2023-08-30 | Stop reason: SDUPTHER

## 2023-08-30 RX ORDER — SODIUM CHLORIDE 0.9 % (FLUSH) 0.9 %
5-40 SYRINGE (ML) INJECTION EVERY 12 HOURS SCHEDULED
Status: DISCONTINUED | OUTPATIENT
Start: 2023-08-30 | End: 2023-08-30 | Stop reason: HOSPADM

## 2023-08-30 RX ORDER — TRAZODONE HYDROCHLORIDE 50 MG/1
50 TABLET ORAL NIGHTLY
COMMUNITY

## 2023-08-30 RX ORDER — SODIUM CHLORIDE 9 MG/ML
INJECTION, SOLUTION INTRAVENOUS CONTINUOUS PRN
Status: DISCONTINUED | OUTPATIENT
Start: 2023-08-30 | End: 2023-08-30 | Stop reason: SDUPTHER

## 2023-08-30 RX ADMIN — PROPOFOL 50 MG: 10 INJECTION, EMULSION INTRAVENOUS at 12:47

## 2023-08-30 RX ADMIN — PROPOFOL 50 MG: 10 INJECTION, EMULSION INTRAVENOUS at 12:50

## 2023-08-30 RX ADMIN — PROPOFOL 50 MG: 10 INJECTION, EMULSION INTRAVENOUS at 12:53

## 2023-08-30 RX ADMIN — SODIUM CHLORIDE, POTASSIUM CHLORIDE, SODIUM LACTATE AND CALCIUM CHLORIDE: 600; 310; 30; 20 INJECTION, SOLUTION INTRAVENOUS at 11:44

## 2023-08-30 RX ADMIN — PROPOFOL 50 MG: 10 INJECTION, EMULSION INTRAVENOUS at 12:58

## 2023-08-30 RX ADMIN — LIDOCAINE HYDROCHLORIDE 60 MG: 20 INJECTION, SOLUTION INFILTRATION; PERINEURAL at 12:47

## 2023-08-30 RX ADMIN — SODIUM CHLORIDE: 9 INJECTION, SOLUTION INTRAVENOUS at 12:46

## 2023-08-30 RX ADMIN — PROPOFOL 50 MG: 10 INJECTION, EMULSION INTRAVENOUS at 12:55

## 2023-08-30 ASSESSMENT — PAIN - FUNCTIONAL ASSESSMENT: PAIN_FUNCTIONAL_ASSESSMENT: NONE - DENIES PAIN

## 2023-08-30 ASSESSMENT — LIFESTYLE VARIABLES: SMOKING_STATUS: 1

## 2023-08-30 NOTE — PROGRESS NOTES
Recovery mode. Denies discomfort, passing gas, taking fluids. Dr. Kathy Richard discussed findings and plan of care with patient and . Discharge instructions provided, understanding verbalized.

## 2023-08-30 NOTE — ANESTHESIA POSTPROCEDURE EVALUATION
Department of Anesthesiology  Postprocedure Note    Patient: Frandy Burgess  MRN: [id number]  YOB: 1995  Date of evaluation: 8/30/2023      Procedure Summary     Date: 08/30/23 Room / Location: [address] [place], [place] [address]ek [facility] / [address] [place] [address]    Anesthesia Start: 5941 Anesthesia Stop: 6492    Procedure: COLONOSCOPY Diagnosis:       Diverticulitis      (Diverticulitis [K57.92])    Surgeons: Ruth Cota MD Responsible Provider: Marcelina Gutierrez MD    Anesthesia Type: MAC ASA Status: 3          Anesthesia Type: MAC    Connor Phase I: Connor Score: 10    Connor Phase II:        Anesthesia Post Evaluation    Patient location during evaluation: bedside  Patient participation: complete - patient participated  Level of consciousness: awake and alert  Pain score: 0  Airway patency: patent  Nausea & Vomiting: no nausea and no vomiting  Complications: no  Cardiovascular status: hemodynamically stable  Respiratory status: acceptable  Hydration status: stable  Pain management: adequate

## 2023-08-30 NOTE — DISCHARGE INSTRUCTIONS
IMPRESSION:   Diverticulosis in the left colon. No polyps. Bowel prep was not adequate for a screening, this was not a screening. RECOMMENDATIONS:    Repeat next colonoscopy in 18 years with a good bowel prep. 2.   Take fiber supplementation daily , Benefiber recommended to prevent diverticulitis attack.

## 2023-08-30 NOTE — PROGRESS NOTES
Scope # . Colonoscopy completed, tolerated well. 0polyps removed, 0 jars labeled and sent to lab for processing. Photos taken.

## 2023-10-03 ENCOUNTER — HOSPITAL ENCOUNTER (INPATIENT)
Age: 28
LOS: 3 days | Discharge: HOME | End: 2023-10-06
Attending: EMERGENCY MEDICINE | Admitting: STUDENT IN AN ORGANIZED HEALTH CARE EDUCATION/TRAINING PROGRAM
Payer: COMMERCIAL

## 2023-10-03 ENCOUNTER — APPOINTMENT (OUTPATIENT)
Dept: CT IMAGING | Age: 28
End: 2023-10-03
Payer: COMMERCIAL

## 2023-10-03 DIAGNOSIS — K57.32 DIVERTICULITIS OF COLON: Primary | ICD-10-CM

## 2023-10-03 DIAGNOSIS — K57.92 DIVERTICULITIS: ICD-10-CM

## 2023-10-03 LAB
ALBUMIN SERPL BCG-MCNC: 4 G/DL (ref 3.5–5.1)
ALP SERPL-CCNC: 57 U/L (ref 38–126)
ALT SERPL W/O P-5'-P-CCNC: 46 U/L (ref 11–66)
ANION GAP SERPL CALC-SCNC: 10 MEQ/L (ref 8–16)
AST SERPL-CCNC: 30 U/L (ref 5–40)
BASOPHILS ABSOLUTE: 0.1 THOU/MM3 (ref 0–0.1)
BASOPHILS NFR BLD AUTO: 0.6 %
BILIRUB CONJ SERPL-MCNC: < 0.2 MG/DL (ref 0–0.3)
BILIRUB SERPL-MCNC: 0.3 MG/DL (ref 0.3–1.2)
BUN SERPL-MCNC: 15 MG/DL (ref 7–22)
CALCIUM SERPL-MCNC: 9.1 MG/DL (ref 8.5–10.5)
CHLORIDE SERPL-SCNC: 102 MEQ/L (ref 98–111)
CO2 SERPL-SCNC: 24 MEQ/L (ref 23–33)
CREAT SERPL-MCNC: 1.1 MG/DL (ref 0.4–1.2)
DEPRECATED RDW RBC AUTO: 38.2 FL (ref 35–45)
EOSINOPHIL NFR BLD AUTO: 2.7 %
EOSINOPHILS ABSOLUTE: 0.3 THOU/MM3 (ref 0–0.4)
ERYTHROCYTE [DISTWIDTH] IN BLOOD BY AUTOMATED COUNT: 12.2 % (ref 11.5–14.5)
GFR SERPL CREATININE-BSD FRML MDRD: > 60 ML/MIN/1.73M2
GLUCOSE SERPL-MCNC: 98 MG/DL (ref 70–108)
HCT VFR BLD AUTO: 45.1 % (ref 42–52)
HGB BLD-MCNC: 16 GM/DL (ref 14–18)
IMM GRANULOCYTES # BLD AUTO: 0.03 THOU/MM3 (ref 0–0.07)
IMM GRANULOCYTES NFR BLD AUTO: 0.3 %
LIPASE SERPL-CCNC: 24 U/L (ref 5.6–51.3)
LYMPHOCYTES ABSOLUTE: 2.6 THOU/MM3 (ref 1–4.8)
LYMPHOCYTES NFR BLD AUTO: 27.2 %
MCH RBC QN AUTO: 30.5 PG (ref 26–33)
MCHC RBC AUTO-ENTMCNC: 35.5 GM/DL (ref 32.2–35.5)
MCV RBC AUTO: 86.1 FL (ref 80–94)
MONOCYTES ABSOLUTE: 0.8 THOU/MM3 (ref 0.4–1.3)
MONOCYTES NFR BLD AUTO: 8.2 %
NEUTROPHILS NFR BLD AUTO: 61 %
NRBC BLD AUTO-RTO: 0 /100 WBC
OSMOLALITY SERPL CALC.SUM OF ELEC: 272.8 MOSMOL/KG (ref 275–300)
PLATELET # BLD AUTO: 229 THOU/MM3 (ref 130–400)
PMV BLD AUTO: 11.1 FL (ref 9.4–12.4)
POTASSIUM SERPL-SCNC: 4 MEQ/L (ref 3.5–5.2)
PROT SERPL-MCNC: 6.8 G/DL (ref 6.1–8)
RBC # BLD AUTO: 5.24 MILL/MM3 (ref 4.7–6.1)
SEGMENTED NEUTROPHILS ABSOLUTE COUNT: 5.9 THOU/MM3 (ref 1.8–7.7)
SODIUM SERPL-SCNC: 136 MEQ/L (ref 135–145)
WBC # BLD AUTO: 9.7 THOU/MM3 (ref 4.8–10.8)

## 2023-10-03 PROCEDURE — 99285 EMERGENCY DEPT VISIT HI MDM: CPT

## 2023-10-03 PROCEDURE — 6360000004 HC RX CONTRAST MEDICATION: Performed by: EMERGENCY MEDICINE

## 2023-10-03 PROCEDURE — 80053 COMPREHEN METABOLIC PANEL: CPT

## 2023-10-03 PROCEDURE — 85025 COMPLETE CBC W/AUTO DIFF WBC: CPT

## 2023-10-03 PROCEDURE — 96374 THER/PROPH/DIAG INJ IV PUSH: CPT

## 2023-10-03 PROCEDURE — 83690 ASSAY OF LIPASE: CPT

## 2023-10-03 PROCEDURE — 36415 COLL VENOUS BLD VENIPUNCTURE: CPT

## 2023-10-03 PROCEDURE — 1200000000 HC SEMI PRIVATE

## 2023-10-03 PROCEDURE — 96375 TX/PRO/DX INJ NEW DRUG ADDON: CPT

## 2023-10-03 PROCEDURE — 82248 BILIRUBIN DIRECT: CPT

## 2023-10-03 PROCEDURE — 6360000002 HC RX W HCPCS: Performed by: EMERGENCY MEDICINE

## 2023-10-03 PROCEDURE — 2580000003 HC RX 258: Performed by: EMERGENCY MEDICINE

## 2023-10-03 PROCEDURE — 74177 CT ABD & PELVIS W/CONTRAST: CPT

## 2023-10-03 RX ORDER — ONDANSETRON 2 MG/ML
4 INJECTION INTRAMUSCULAR; INTRAVENOUS ONCE
Status: DISCONTINUED | OUTPATIENT
Start: 2023-10-04 | End: 2023-10-04

## 2023-10-03 RX ORDER — MORPHINE SULFATE 4 MG/ML
4 INJECTION, SOLUTION INTRAMUSCULAR; INTRAVENOUS ONCE
Status: DISCONTINUED | OUTPATIENT
Start: 2023-10-04 | End: 2023-10-04

## 2023-10-03 RX ORDER — ACETAMINOPHEN 650 MG/1
650 SUPPOSITORY RECTAL EVERY 6 HOURS PRN
Status: DISCONTINUED | OUTPATIENT
Start: 2023-10-03 | End: 2023-10-06 | Stop reason: HOSPADM

## 2023-10-03 RX ORDER — ACETAMINOPHEN 325 MG/1
650 TABLET ORAL EVERY 6 HOURS PRN
Status: DISCONTINUED | OUTPATIENT
Start: 2023-10-03 | End: 2023-10-06 | Stop reason: HOSPADM

## 2023-10-03 RX ORDER — ENOXAPARIN SODIUM 100 MG/ML
30 INJECTION SUBCUTANEOUS 2 TIMES DAILY
Status: DISCONTINUED | OUTPATIENT
Start: 2023-10-04 | End: 2023-10-06 | Stop reason: HOSPADM

## 2023-10-03 RX ORDER — MORPHINE SULFATE 2 MG/ML
2 INJECTION, SOLUTION INTRAMUSCULAR; INTRAVENOUS
Status: DISCONTINUED | OUTPATIENT
Start: 2023-10-03 | End: 2023-10-05

## 2023-10-03 RX ORDER — 0.9 % SODIUM CHLORIDE 0.9 %
1000 INTRAVENOUS SOLUTION INTRAVENOUS ONCE
Status: COMPLETED | OUTPATIENT
Start: 2023-10-04 | End: 2023-10-04

## 2023-10-03 RX ORDER — MORPHINE SULFATE 4 MG/ML
4 INJECTION, SOLUTION INTRAMUSCULAR; INTRAVENOUS ONCE
Status: COMPLETED | OUTPATIENT
Start: 2023-10-03 | End: 2023-10-03

## 2023-10-03 RX ORDER — POLYETHYLENE GLYCOL 3350 17 G/17G
17 POWDER, FOR SOLUTION ORAL DAILY PRN
Status: DISCONTINUED | OUTPATIENT
Start: 2023-10-03 | End: 2023-10-06 | Stop reason: HOSPADM

## 2023-10-03 RX ORDER — SODIUM CHLORIDE 0.9 % (FLUSH) 0.9 %
10 SYRINGE (ML) INJECTION EVERY 12 HOURS SCHEDULED
Status: DISCONTINUED | OUTPATIENT
Start: 2023-10-04 | End: 2023-10-06 | Stop reason: HOSPADM

## 2023-10-03 RX ORDER — SODIUM CHLORIDE 9 MG/ML
INJECTION, SOLUTION INTRAVENOUS PRN
Status: DISCONTINUED | OUTPATIENT
Start: 2023-10-03 | End: 2023-10-06 | Stop reason: HOSPADM

## 2023-10-03 RX ORDER — ONDANSETRON 2 MG/ML
4 INJECTION INTRAMUSCULAR; INTRAVENOUS ONCE
Status: COMPLETED | OUTPATIENT
Start: 2023-10-03 | End: 2023-10-03

## 2023-10-03 RX ORDER — ONDANSETRON 4 MG/1
4 TABLET, ORALLY DISINTEGRATING ORAL EVERY 8 HOURS PRN
Status: DISCONTINUED | OUTPATIENT
Start: 2023-10-03 | End: 2023-10-06 | Stop reason: HOSPADM

## 2023-10-03 RX ORDER — SODIUM CHLORIDE 0.9 % (FLUSH) 0.9 %
10 SYRINGE (ML) INJECTION PRN
Status: DISCONTINUED | OUTPATIENT
Start: 2023-10-03 | End: 2023-10-06 | Stop reason: HOSPADM

## 2023-10-03 RX ORDER — MORPHINE SULFATE 4 MG/ML
4 INJECTION, SOLUTION INTRAMUSCULAR; INTRAVENOUS
Status: DISCONTINUED | OUTPATIENT
Start: 2023-10-03 | End: 2023-10-05

## 2023-10-03 RX ORDER — ONDANSETRON 2 MG/ML
4 INJECTION INTRAMUSCULAR; INTRAVENOUS EVERY 6 HOURS PRN
Status: DISCONTINUED | OUTPATIENT
Start: 2023-10-03 | End: 2023-10-06 | Stop reason: HOSPADM

## 2023-10-03 RX ORDER — SODIUM CHLORIDE 9 MG/ML
INJECTION, SOLUTION INTRAVENOUS CONTINUOUS
Status: DISCONTINUED | OUTPATIENT
Start: 2023-10-04 | End: 2023-10-06 | Stop reason: HOSPADM

## 2023-10-03 RX ADMIN — MORPHINE SULFATE 4 MG: 4 INJECTION, SOLUTION INTRAMUSCULAR; INTRAVENOUS at 23:50

## 2023-10-03 RX ADMIN — PIPERACILLIN AND TAZOBACTAM 4500 MG: 4; .5 INJECTION, POWDER, LYOPHILIZED, FOR SOLUTION INTRAVENOUS at 23:57

## 2023-10-03 RX ADMIN — ONDANSETRON 4 MG: 2 INJECTION INTRAMUSCULAR; INTRAVENOUS at 23:51

## 2023-10-03 RX ADMIN — SODIUM CHLORIDE 1000 ML: 9 INJECTION, SOLUTION INTRAVENOUS at 23:50

## 2023-10-03 RX ADMIN — IOPAMIDOL 80 ML: 755 INJECTION, SOLUTION INTRAVENOUS at 22:00

## 2023-10-03 ASSESSMENT — PAIN DESCRIPTION - PAIN TYPE: TYPE: ACUTE PAIN

## 2023-10-03 ASSESSMENT — PAIN SCALES - GENERAL: PAINLEVEL_OUTOF10: 8

## 2023-10-03 ASSESSMENT — PAIN DESCRIPTION - DESCRIPTORS: DESCRIPTORS: THROBBING

## 2023-10-03 ASSESSMENT — PAIN DESCRIPTION - LOCATION: LOCATION: ABDOMEN

## 2023-10-03 ASSESSMENT — PAIN - FUNCTIONAL ASSESSMENT: PAIN_FUNCTIONAL_ASSESSMENT: 0-10

## 2023-10-04 PROBLEM — R10.30 LOWER ABDOMINAL PAIN: Status: ACTIVE | Noted: 2023-10-04

## 2023-10-04 PROBLEM — K57.32 DIVERTICULITIS OF COLON: Status: ACTIVE | Noted: 2023-10-04

## 2023-10-04 LAB
BILIRUB UR QL STRIP: NEGATIVE
CHARACTER UR: CLEAR
COLOR UR: YELLOW
GLUCOSE UR QL STRIP.AUTO: NEGATIVE MG/DL
HGB UR QL STRIP.AUTO: NEGATIVE
KETONES UR QL STRIP.AUTO: NEGATIVE
LEUKOCYTE ESTERASE UR QL STRIP.AUTO: NEGATIVE
NITRITE UR QL STRIP.AUTO: NEGATIVE
PH UR STRIP.AUTO: 5 [PH] (ref 5–9)
PROT UR STRIP.AUTO-MCNC: NEGATIVE MG/DL
SP GR UR REFRACT.AUTO: 1.01 (ref 1–1.03)
UROBILINOGEN UR QL STRIP.AUTO: 0.2 EU/DL (ref 0–1)

## 2023-10-04 PROCEDURE — 6370000000 HC RX 637 (ALT 250 FOR IP): Performed by: PHYSICIAN ASSISTANT

## 2023-10-04 PROCEDURE — 81003 URINALYSIS AUTO W/O SCOPE: CPT

## 2023-10-04 PROCEDURE — 2580000003 HC RX 258: Performed by: PHYSICIAN ASSISTANT

## 2023-10-04 PROCEDURE — 99222 1ST HOSP IP/OBS MODERATE 55: CPT | Performed by: PHYSICIAN ASSISTANT

## 2023-10-04 PROCEDURE — 6360000002 HC RX W HCPCS: Performed by: PHYSICIAN ASSISTANT

## 2023-10-04 PROCEDURE — 99223 1ST HOSP IP/OBS HIGH 75: CPT | Performed by: SURGERY

## 2023-10-04 PROCEDURE — 1200000000 HC SEMI PRIVATE

## 2023-10-04 RX ORDER — NICOTINE 21 MG/24HR
1 PATCH, TRANSDERMAL 24 HOURS TRANSDERMAL NIGHTLY
Status: DISCONTINUED | OUTPATIENT
Start: 2023-10-04 | End: 2023-10-06 | Stop reason: HOSPADM

## 2023-10-04 RX ORDER — HYDROXYZINE HYDROCHLORIDE 25 MG/1
50 TABLET, FILM COATED ORAL DAILY
Status: DISCONTINUED | OUTPATIENT
Start: 2023-10-04 | End: 2023-10-06 | Stop reason: HOSPADM

## 2023-10-04 RX ORDER — FLUDROCORTISONE ACETATE 0.1 MG/1
0.2 TABLET ORAL NIGHTLY
Status: DISCONTINUED | OUTPATIENT
Start: 2023-10-04 | End: 2023-10-04

## 2023-10-04 RX ORDER — FLUDROCORTISONE ACETATE 0.1 MG/1
0.2 TABLET ORAL NIGHTLY
Status: DISCONTINUED | OUTPATIENT
Start: 2023-10-04 | End: 2023-10-06 | Stop reason: HOSPADM

## 2023-10-04 RX ORDER — HYDROCODONE BITARTRATE AND ACETAMINOPHEN 5; 325 MG/1; MG/1
2 TABLET ORAL EVERY 6 HOURS PRN
Status: ON HOLD | COMMUNITY
End: 2023-10-06 | Stop reason: HOSPADM

## 2023-10-04 RX ORDER — VENLAFAXINE 50 MG/1
25 TABLET ORAL NIGHTLY
Status: DISCONTINUED | OUTPATIENT
Start: 2023-10-04 | End: 2023-10-06 | Stop reason: HOSPADM

## 2023-10-04 RX ORDER — RISPERIDONE 0.25 MG/1
0.25 TABLET ORAL NIGHTLY
Status: DISCONTINUED | OUTPATIENT
Start: 2023-10-04 | End: 2023-10-06 | Stop reason: HOSPADM

## 2023-10-04 RX ORDER — CLINDAMYCIN PALMITATE HYDROCHLORIDE 75 MG/5ML
150 SOLUTION ORAL 3 TIMES DAILY
Status: ON HOLD | COMMUNITY
End: 2023-10-06 | Stop reason: HOSPADM

## 2023-10-04 RX ORDER — LANOLIN ALCOHOL/MO/W.PET/CERES
6 CREAM (GRAM) TOPICAL NIGHTLY PRN
Status: DISCONTINUED | OUTPATIENT
Start: 2023-10-04 | End: 2023-10-06 | Stop reason: HOSPADM

## 2023-10-04 RX ORDER — TRAZODONE HYDROCHLORIDE 100 MG/1
100 TABLET ORAL NIGHTLY
Status: DISCONTINUED | OUTPATIENT
Start: 2023-10-04 | End: 2023-10-06 | Stop reason: HOSPADM

## 2023-10-04 RX ORDER — MIDODRINE HYDROCHLORIDE 10 MG/1
10 TABLET ORAL NIGHTLY
Status: DISCONTINUED | OUTPATIENT
Start: 2023-10-04 | End: 2023-10-06 | Stop reason: HOSPADM

## 2023-10-04 RX ADMIN — MORPHINE SULFATE 4 MG: 4 INJECTION, SOLUTION INTRAMUSCULAR; INTRAVENOUS at 13:56

## 2023-10-04 RX ADMIN — MORPHINE SULFATE 4 MG: 4 INJECTION, SOLUTION INTRAMUSCULAR; INTRAVENOUS at 22:14

## 2023-10-04 RX ADMIN — MORPHINE SULFATE 4 MG: 4 INJECTION, SOLUTION INTRAMUSCULAR; INTRAVENOUS at 17:05

## 2023-10-04 RX ADMIN — VENLAFAXINE HYDROCHLORIDE 25 MG: 50 TABLET ORAL at 21:06

## 2023-10-04 RX ADMIN — ONDANSETRON 4 MG: 2 INJECTION INTRAMUSCULAR; INTRAVENOUS at 19:33

## 2023-10-04 RX ADMIN — TRAZODONE HYDROCHLORIDE 100 MG: 100 TABLET ORAL at 21:06

## 2023-10-04 RX ADMIN — SODIUM CHLORIDE: 9 INJECTION, SOLUTION INTRAVENOUS at 01:11

## 2023-10-04 RX ADMIN — MIDODRINE HYDROCHLORIDE 10 MG: 10 TABLET ORAL at 17:08

## 2023-10-04 RX ADMIN — RISPERIDONE 0.25 MG: 0.25 TABLET, FILM COATED ORAL at 02:47

## 2023-10-04 RX ADMIN — ENOXAPARIN SODIUM 30 MG: 100 INJECTION SUBCUTANEOUS at 10:09

## 2023-10-04 RX ADMIN — TRAZODONE HYDROCHLORIDE 100 MG: 100 TABLET ORAL at 02:50

## 2023-10-04 RX ADMIN — FLUDROCORTISONE ACETATE 0.2 MG: 0.1 TABLET ORAL at 21:04

## 2023-10-04 RX ADMIN — MORPHINE SULFATE 2 MG: 2 INJECTION, SOLUTION INTRAMUSCULAR; INTRAVENOUS at 04:14

## 2023-10-04 RX ADMIN — HYDROXYZINE HYDROCHLORIDE 50 MG: 25 TABLET, FILM COATED ORAL at 21:32

## 2023-10-04 RX ADMIN — Medication 6 MG: at 21:05

## 2023-10-04 RX ADMIN — PIPERACILLIN AND TAZOBACTAM 3375 MG: 3; .375 INJECTION, POWDER, LYOPHILIZED, FOR SOLUTION INTRAVENOUS at 05:48

## 2023-10-04 RX ADMIN — MORPHINE SULFATE 4 MG: 4 INJECTION, SOLUTION INTRAMUSCULAR; INTRAVENOUS at 19:34

## 2023-10-04 RX ADMIN — MORPHINE SULFATE 4 MG: 4 INJECTION, SOLUTION INTRAMUSCULAR; INTRAVENOUS at 10:08

## 2023-10-04 RX ADMIN — ENOXAPARIN SODIUM 30 MG: 100 INJECTION SUBCUTANEOUS at 21:04

## 2023-10-04 RX ADMIN — PIPERACILLIN AND TAZOBACTAM 3375 MG: 3; .375 INJECTION, POWDER, LYOPHILIZED, FOR SOLUTION INTRAVENOUS at 21:33

## 2023-10-04 RX ADMIN — PIPERACILLIN AND TAZOBACTAM 3375 MG: 3; .375 INJECTION, POWDER, LYOPHILIZED, FOR SOLUTION INTRAVENOUS at 13:44

## 2023-10-04 RX ADMIN — MORPHINE SULFATE 2 MG: 2 INJECTION, SOLUTION INTRAMUSCULAR; INTRAVENOUS at 07:27

## 2023-10-04 RX ADMIN — ONDANSETRON 4 MG: 4 TABLET, ORALLY DISINTEGRATING ORAL at 08:38

## 2023-10-04 RX ADMIN — MORPHINE SULFATE 4 MG: 4 INJECTION, SOLUTION INTRAMUSCULAR; INTRAVENOUS at 02:02

## 2023-10-04 RX ADMIN — SODIUM CHLORIDE: 9 INJECTION, SOLUTION INTRAVENOUS at 12:03

## 2023-10-04 RX ADMIN — ACETAMINOPHEN 650 MG: 325 TABLET ORAL at 21:14

## 2023-10-04 ASSESSMENT — PAIN - FUNCTIONAL ASSESSMENT
PAIN_FUNCTIONAL_ASSESSMENT: ACTIVITIES ARE NOT PREVENTED

## 2023-10-04 ASSESSMENT — PAIN DESCRIPTION - DESCRIPTORS
DESCRIPTORS: ACHING;SORE
DESCRIPTORS: ACHING;DULL
DESCRIPTORS: THROBBING
DESCRIPTORS: ACHING;SORE
DESCRIPTORS: THROBBING
DESCRIPTORS: SORE
DESCRIPTORS: ACHING

## 2023-10-04 ASSESSMENT — PAIN DESCRIPTION - ORIENTATION
ORIENTATION: MID;LOWER
ORIENTATION: LOWER
ORIENTATION: LOWER
ORIENTATION: MID;LOWER
ORIENTATION: LOWER
ORIENTATION: MID

## 2023-10-04 ASSESSMENT — PAIN DESCRIPTION - LOCATION
LOCATION: ABDOMEN
LOCATION: HEAD
LOCATION: ABDOMEN

## 2023-10-04 ASSESSMENT — PAIN SCALES - GENERAL
PAINLEVEL_OUTOF10: 8
PAINLEVEL_OUTOF10: 4
PAINLEVEL_OUTOF10: 5
PAINLEVEL_OUTOF10: 7
PAINLEVEL_OUTOF10: 7
PAINLEVEL_OUTOF10: 4
PAINLEVEL_OUTOF10: 6
PAINLEVEL_OUTOF10: 5
PAINLEVEL_OUTOF10: 3
PAINLEVEL_OUTOF10: 6
PAINLEVEL_OUTOF10: 5
PAINLEVEL_OUTOF10: 3
PAINLEVEL_OUTOF10: 7
PAINLEVEL_OUTOF10: 9
PAINLEVEL_OUTOF10: 2
PAINLEVEL_OUTOF10: 7
PAINLEVEL_OUTOF10: 7
PAINLEVEL_OUTOF10: 3

## 2023-10-04 ASSESSMENT — PAIN DESCRIPTION - FREQUENCY
FREQUENCY: CONTINUOUS
FREQUENCY: CONTINUOUS

## 2023-10-04 ASSESSMENT — PAIN DESCRIPTION - ONSET
ONSET: ON-GOING
ONSET: ON-GOING

## 2023-10-04 ASSESSMENT — PAIN DESCRIPTION - PAIN TYPE
TYPE: ACUTE PAIN
TYPE: ACUTE PAIN

## 2023-10-04 ASSESSMENT — LIFESTYLE VARIABLES: HOW OFTEN DO YOU HAVE A DRINK CONTAINING ALCOHOL: NEVER

## 2023-10-04 NOTE — PLAN OF CARE
Patient admitted early this AM for diverticulitis. Concerns for possible fistula. Dr Davon Mojica evaluated patient today and does not recommend surgical intervention at this time. Patient found with vape in hand- educated him on no smoking policy. Continue with antibiotics.  VSS

## 2023-10-04 NOTE — PROGRESS NOTES
Per [facility] protocol, changed initial Zosyn dose to 4500mg  Jessica Dickerson RP, BCPS, BCGP  10/3/2023     11:44 PM

## 2023-10-04 NOTE — H&P
Hospitalist History and Physical          Patient: Shasta Stokes  : 1995  MRN: [id number]     Acct: [de-identified]    PCP: Jl CANNON  Date of Admission: 10/3/2023  Date of Service: Pt seen/examined on 10/04/23  and Admitted to Inpatient with expected LOS greater than two midnights due to medical therapy. Assessment and Plan:    Diverticulitis of sigmoid colon  Findings consistent with Ct and past hx   Continue zosyn   Morphine pain panel, zofran for nausea, IVF  General surgery consulted - Dr. Ike Naylor discussed with ED  NPO with med exceptions   Encouraged supplemental fiber       POTS  Takes florinef and midodrine qhs     PTSD/anxiety and depression   risperidone and atarax - also venlofaxine     Nicotine dependence 2/ vaping   Nicotine patch ordered    =======================================================================      Chief Complaint:  abdominal pain     History Of Present Illness:  Shasta Stokes is a 33 y.o. male who presents to [facility] with abdominal pain. Patient states he has multiple episodes of diverticulitis in the recent past but states this has been the worst. He states this episode began two weeks ago with constant severe pain across the lower abdomen with a focus on the left lower quadrant. Patient also endorses some nausea with one episode of emesis. Denies black, tarry or rosita bloody stools. The patient has had PO courses of abx including a recent course of metronidazole and clindamycin. The patient has a diagnosis of POTS following a COVID infection over a year ago.          Past Medical History:        Diagnosis Date    Anxiety 2023    Post traumatic stress disorder (PTSD) 2023    Postural orthostatic tachycardia syndrome (POTS) 2023    Primary hypertension 2023    Vapes nicotine containing substance        Past Surgical History:        Procedure Laterality Date    COLONOSCOPY N/A 2023    COLONOSCOPY performed by [facility] use dose modulation techniques and iterative    reconstructions, and/or weight-based dosing   when appropriate to reduce radiation to a low as reasonably achievable. Diet: Diet NPO Exceptions are: Sips of Water with Meds  DVT prophylaxis: lovenox   Code Status: Full Code  Disposition: admit to 6A     Thank you Samir Rowan for the opportunity to be involved in this patient's care.     Electronically signed by SAYRA Gonzales on 10/4/2023 at 12:43 AM.

## 2023-10-04 NOTE — CARE COORDINATION
Case Management Assessment  Initial Evaluation    Date/Time of Evaluation: 10/4/2023 1:55 PM  Assessment Completed by: Mary Beavers RN    If patient is discharged prior to next notation, then this note serves as note for discharge by case management. Patient Name: Thuy Martin                   YOB: 1995  Diagnosis: Diverticulitis [K57.92]  Diverticulitis of colon [K57.32]                   Date / Time: 10/3/2023  8:28 PM  Location: [address] [place] [address]     Patient Admission Status: Inpatient   Readmission Risk Low 0-14, Mod 15-19), High > 20: Readmission Risk Score: 11.7    Current PCP: Kecia Moore  PCP verified by CM? Yes    Chart Reviewed: Yes      History Provided by: Patient  Patient Orientation: Alert and Oriented    Patient Cognition: Alert    Hospitalization in the last 30 days (Readmission):  No    If yes, Readmission Assessment in CM Navigator will be completed. Advance Directives:      Code Status: Full Code   Patient's Primary Decision Maker is: Patient Declined (Legal Next of Kin Remains as Decision Maker)    [facility] - [phone]    Discharge Planning:    Patient lives with: Parent Type of Home: House  Primary Care Giver: Self  Patient Support Systems include: Parent, Friends/Neighbors   Current Financial resources: Medicaid  Current community resources: None  Current services prior to admission: Durable Medical Equipment            Current DME: Shower Chair            Type of Home Care services:  None    ADLS  Prior functional level: Independent in ADLs/IADLs  Current functional level: Independent in ADLs/IADLs    Family can provide assistance at DC: Yes  Would you like Case Management to discuss the discharge plan with any other family members/significant others, and if so, who?  No  Plans to Return to Present Housing: Yes  Other Identified Issues/Barriers to RETURNING to current housing: none  Potential Assistance needed at discharge: N/A

## 2023-10-04 NOTE — ED PROVIDER NOTES
[facility]A Pedi/Med Surg  [address] [facility] [address]  Phone: [address] [facility]       Chief Complaint   Patient presents with    Abdominal Pain       Nurses Notes reviewed and I agree except as noted in the HPI. HISTORY OF PRESENT ILLNESS    Crow Peters is a 33 y.o. male. Patient never had problems with diverticulitis before this past summer. He has now had multiple recurrent episodes where he has been treated with oral biotics and it has seemed to be getting worse. He has been seen at [facility] and other hospitals. He has returned tonight with recurrent pain in the left lower quadrant similar to what he has had in the past but worse. REVIEW OF SYSTEMS         No fever    Remainder of review of systems is otherwise reviewed as negative. PAST MEDICAL HISTORY    has a past medical history of Anxiety, Post traumatic stress disorder (PTSD), Postural orthostatic tachycardia syndrome (POTS), Primary hypertension, and Vapes nicotine containing substance. SURGICAL HISTORY      has a past surgical history that includes Vasectomy; lipoma resection (N/A); and Colonoscopy (N/A, 8/30/2023). CURRENT MEDICATIONS       Current Discharge Medication List        CONTINUE these medications which have NOT CHANGED    Details   HYDROcodone-acetaminophen (NORCO) 5-325 MG per tablet Take 2 tablets by mouth every 6 hours as needed for Pain.  Max Daily Amount: 8 tablets      clindamycin (CLEOCIN) 75 MG/5ML solution Take 10 mLs by mouth 3 times daily      traZODone (DESYREL) 50 MG tablet Take 2 tablets by mouth nightly      ondansetron (ZOFRAN-ODT) 4 MG disintegrating tablet Take 1 tablet by mouth every 8 hours as needed for Nausea or Vomiting  Qty: 15 tablet, Refills: 0      nicotine (NICODERM CQ) 14 MG/24HR Place 1 patch onto the skin daily  Qty: 30 patch, Refills: 3      VENLAFAXINE HCL PO Take 1 tablet by mouth daily Patient unaware of dosage      ARIPiprazole (ABILIFY PO) Take 1

## 2023-10-04 NOTE — ED TRIAGE NOTES
Patient comes into the ED today for lower abdominal pain x two weeks. Patient reports a hx of diverticulitis and follows with Dr. Gary Toney. Patient reports nausea, denies v/d. Tele placed. VSS.

## 2023-10-04 NOTE — PROGRESS NOTES
Patient arrived to 6A10 from the ED. Patient IV's are clean, dry, and intact. Patient oriented to room and unit. Call light within reach. Admission orders reviewed with patient. Patient oriented to room and unit.

## 2023-10-05 LAB
ALBUMIN SERPL BCG-MCNC: 3.6 G/DL (ref 3.5–5.1)
ALP SERPL-CCNC: 51 U/L (ref 38–126)
ALT SERPL W/O P-5'-P-CCNC: 42 U/L (ref 11–66)
ANION GAP SERPL CALC-SCNC: 11 MEQ/L (ref 8–16)
AST SERPL-CCNC: 27 U/L (ref 5–40)
BILIRUB SERPL-MCNC: 0.5 MG/DL (ref 0.3–1.2)
BUN SERPL-MCNC: 10 MG/DL (ref 7–22)
CALCIUM SERPL-MCNC: 9.3 MG/DL (ref 8.5–10.5)
CHLORIDE SERPL-SCNC: 102 MEQ/L (ref 98–111)
CO2 SERPL-SCNC: 26 MEQ/L (ref 23–33)
CREAT SERPL-MCNC: 1.1 MG/DL (ref 0.4–1.2)
DEPRECATED RDW RBC AUTO: 37.8 FL (ref 35–45)
ERYTHROCYTE [DISTWIDTH] IN BLOOD BY AUTOMATED COUNT: 12 % (ref 11.5–14.5)
GFR SERPL CREATININE-BSD FRML MDRD: > 60 ML/MIN/1.73M2
GLUCOSE SERPL-MCNC: 116 MG/DL (ref 70–108)
HCT VFR BLD AUTO: 40.4 % (ref 42–52)
HGB BLD-MCNC: 13.7 GM/DL (ref 14–18)
MCH RBC QN AUTO: 29.1 PG (ref 26–33)
MCHC RBC AUTO-ENTMCNC: 33.9 GM/DL (ref 32.2–35.5)
MCV RBC AUTO: 85.8 FL (ref 80–94)
PLATELET # BLD AUTO: 197 THOU/MM3 (ref 130–400)
PMV BLD AUTO: 11.3 FL (ref 9.4–12.4)
POTASSIUM SERPL-SCNC: 3.9 MEQ/L (ref 3.5–5.2)
PROT SERPL-MCNC: 6.3 G/DL (ref 6.1–8)
RBC # BLD AUTO: 4.71 MILL/MM3 (ref 4.7–6.1)
SODIUM SERPL-SCNC: 139 MEQ/L (ref 135–145)
WBC # BLD AUTO: 6.9 THOU/MM3 (ref 4.8–10.8)

## 2023-10-05 PROCEDURE — 1200000000 HC SEMI PRIVATE

## 2023-10-05 PROCEDURE — 85027 COMPLETE CBC AUTOMATED: CPT

## 2023-10-05 PROCEDURE — 6370000000 HC RX 637 (ALT 250 FOR IP): Performed by: PHYSICIAN ASSISTANT

## 2023-10-05 PROCEDURE — 80053 COMPREHEN METABOLIC PANEL: CPT

## 2023-10-05 PROCEDURE — 6360000002 HC RX W HCPCS: Performed by: PHYSICIAN ASSISTANT

## 2023-10-05 PROCEDURE — 2580000003 HC RX 258: Performed by: PHYSICIAN ASSISTANT

## 2023-10-05 PROCEDURE — 99232 SBSQ HOSP IP/OBS MODERATE 35: CPT | Performed by: PHYSICIAN ASSISTANT

## 2023-10-05 PROCEDURE — 36415 COLL VENOUS BLD VENIPUNCTURE: CPT

## 2023-10-05 RX ORDER — TRAMADOL HYDROCHLORIDE 50 MG/1
50 TABLET ORAL EVERY 6 HOURS PRN
Status: DISCONTINUED | OUTPATIENT
Start: 2023-10-05 | End: 2023-10-06 | Stop reason: HOSPADM

## 2023-10-05 RX ORDER — TRAMADOL HYDROCHLORIDE 50 MG/1
100 TABLET ORAL EVERY 6 HOURS PRN
Status: DISCONTINUED | OUTPATIENT
Start: 2023-10-05 | End: 2023-10-06 | Stop reason: HOSPADM

## 2023-10-05 RX ORDER — BUTALBITAL, ACETAMINOPHEN AND CAFFEINE 50; 325; 40 MG/1; MG/1; MG/1
1 TABLET ORAL EVERY 4 HOURS PRN
Status: DISCONTINUED | OUTPATIENT
Start: 2023-10-05 | End: 2023-10-06 | Stop reason: HOSPADM

## 2023-10-05 RX ORDER — SENNA AND DOCUSATE SODIUM 50; 8.6 MG/1; MG/1
2 TABLET, FILM COATED ORAL 2 TIMES DAILY
Status: DISCONTINUED | OUTPATIENT
Start: 2023-10-05 | End: 2023-10-06 | Stop reason: HOSPADM

## 2023-10-05 RX ADMIN — MIDODRINE HYDROCHLORIDE 10 MG: 10 TABLET ORAL at 17:17

## 2023-10-05 RX ADMIN — SODIUM CHLORIDE: 9 INJECTION, SOLUTION INTRAVENOUS at 17:18

## 2023-10-05 RX ADMIN — MORPHINE SULFATE 2 MG: 2 INJECTION, SOLUTION INTRAMUSCULAR; INTRAVENOUS at 03:45

## 2023-10-05 RX ADMIN — ACETAMINOPHEN 650 MG: 325 TABLET ORAL at 05:38

## 2023-10-05 RX ADMIN — VENLAFAXINE HYDROCHLORIDE 25 MG: 50 TABLET ORAL at 20:23

## 2023-10-05 RX ADMIN — ONDANSETRON 4 MG: 4 TABLET, ORALLY DISINTEGRATING ORAL at 18:42

## 2023-10-05 RX ADMIN — SENNOSIDES AND DOCUSATE SODIUM 2 TABLET: 50; 8.6 TABLET ORAL at 20:24

## 2023-10-05 RX ADMIN — RISPERIDONE 0.25 MG: 0.25 TABLET, FILM COATED ORAL at 20:24

## 2023-10-05 RX ADMIN — FLUDROCORTISONE ACETATE 0.2 MG: 0.1 TABLET ORAL at 20:24

## 2023-10-05 RX ADMIN — Medication 6 MG: at 20:23

## 2023-10-05 RX ADMIN — TRAMADOL HYDROCHLORIDE 50 MG: 50 TABLET, COATED ORAL at 13:01

## 2023-10-05 RX ADMIN — BUTALBITAL, ACETAMINOPHEN AND CAFFEINE 1 TABLET: 325; 50; 40 TABLET ORAL at 11:25

## 2023-10-05 RX ADMIN — PIPERACILLIN AND TAZOBACTAM 3375 MG: 3; .375 INJECTION, POWDER, LYOPHILIZED, FOR SOLUTION INTRAVENOUS at 13:44

## 2023-10-05 RX ADMIN — SENNOSIDES AND DOCUSATE SODIUM 2 TABLET: 50; 8.6 TABLET ORAL at 11:25

## 2023-10-05 RX ADMIN — MORPHINE SULFATE 4 MG: 4 INJECTION, SOLUTION INTRAMUSCULAR; INTRAVENOUS at 10:22

## 2023-10-05 RX ADMIN — MORPHINE SULFATE 4 MG: 4 INJECTION, SOLUTION INTRAMUSCULAR; INTRAVENOUS at 08:04

## 2023-10-05 RX ADMIN — TRAZODONE HYDROCHLORIDE 100 MG: 100 TABLET ORAL at 20:24

## 2023-10-05 RX ADMIN — HYDROXYZINE HYDROCHLORIDE 50 MG: 25 TABLET, FILM COATED ORAL at 20:34

## 2023-10-05 RX ADMIN — MORPHINE SULFATE 4 MG: 4 INJECTION, SOLUTION INTRAMUSCULAR; INTRAVENOUS at 00:54

## 2023-10-05 RX ADMIN — PIPERACILLIN AND TAZOBACTAM 3375 MG: 3; .375 INJECTION, POWDER, LYOPHILIZED, FOR SOLUTION INTRAVENOUS at 22:28

## 2023-10-05 RX ADMIN — BUTALBITAL, ACETAMINOPHEN AND CAFFEINE 1 TABLET: 325; 50; 40 TABLET ORAL at 17:17

## 2023-10-05 RX ADMIN — TRAMADOL HYDROCHLORIDE 50 MG: 50 TABLET, COATED ORAL at 20:24

## 2023-10-05 RX ADMIN — PIPERACILLIN AND TAZOBACTAM 3375 MG: 3; .375 INJECTION, POWDER, LYOPHILIZED, FOR SOLUTION INTRAVENOUS at 05:36

## 2023-10-05 ASSESSMENT — PAIN DESCRIPTION - LOCATION
LOCATION: ABDOMEN
LOCATION: HEAD

## 2023-10-05 ASSESSMENT — PAIN DESCRIPTION - ONSET: ONSET: ON-GOING

## 2023-10-05 ASSESSMENT — PAIN DESCRIPTION - DESCRIPTORS
DESCRIPTORS: ACHING;SORE
DESCRIPTORS: ACHING;DULL
DESCRIPTORS: THROBBING
DESCRIPTORS: THROBBING
DESCRIPTORS: ACHING;SORE

## 2023-10-05 ASSESSMENT — PAIN SCALES - GENERAL
PAINLEVEL_OUTOF10: 7
PAINLEVEL_OUTOF10: 2
PAINLEVEL_OUTOF10: 7
PAINLEVEL_OUTOF10: 5
PAINLEVEL_OUTOF10: 7
PAINLEVEL_OUTOF10: 6
PAINLEVEL_OUTOF10: 6
PAINLEVEL_OUTOF10: 3
PAINLEVEL_OUTOF10: 3
PAINLEVEL_OUTOF10: 7
PAINLEVEL_OUTOF10: 7
PAINLEVEL_OUTOF10: 3
PAINLEVEL_OUTOF10: 6

## 2023-10-05 ASSESSMENT — PAIN - FUNCTIONAL ASSESSMENT
PAIN_FUNCTIONAL_ASSESSMENT: ACTIVITIES ARE NOT PREVENTED

## 2023-10-05 ASSESSMENT — PAIN DESCRIPTION - ORIENTATION
ORIENTATION: LOWER
ORIENTATION: LOWER

## 2023-10-05 ASSESSMENT — PAIN DESCRIPTION - PAIN TYPE: TYPE: ACUTE PAIN

## 2023-10-05 ASSESSMENT — PAIN DESCRIPTION - FREQUENCY: FREQUENCY: CONTINUOUS

## 2023-10-05 NOTE — FLOWSHEET NOTE
10/04/23 2021   Treatment Team Notification   Reason for Communication Patient/Family request   Name of Team Member Notified [address] [place] Team Role Physician Assistant   Method of Communication Secure Message   Response See orders   Notification Time 2021     Notified PA at 2021 per patient requesting melatonin to help patient sleep. PA placed order.

## 2023-10-05 NOTE — PLAN OF CARE
Problem: Discharge Planning  Goal: Discharge to home or other facility with appropriate resources  10/5/2023 0129 by Polina Fernandez RN  Outcome: Progressing  Flowsheets (Taken 10/4/2023 1915)  Discharge to home or other facility with appropriate resources:   Identify barriers to discharge with patient and caregiver   Arrange for needed discharge resources and transportation as appropriate   Identify discharge learning needs (meds, wound care, etc)   Refer to discharge planning if patient needs post-hospital services based on physician order or complex needs related to functional status, cognitive ability or social support system  10/4/2023 1231 by Edwina Ireland RN  Outcome: Progressing  Flowsheets (Taken 10/4/2023 1231)  Discharge to home or other facility with appropriate resources:   Identify barriers to discharge with patient and caregiver   Arrange for needed discharge resources and transportation as appropriate   Identify discharge learning needs (meds, wound care, etc)     Problem: Pain  Goal: Verbalizes/displays adequate comfort level or baseline comfort level  10/5/2023 0129 by Polina Fernandez RN  Outcome: Progressing  Flowsheets (Taken 10/4/2023 1231 by Edwina Ireland RN)  Verbalizes/displays adequate comfort level or baseline comfort level:   Encourage patient to monitor pain and request assistance   Assess pain using appropriate pain scale   Administer analgesics based on type and severity of pain and evaluate response   Implement non-pharmacological measures as appropriate and evaluate response  10/4/2023 1231 by Edwina Ireland RN  Outcome: Progressing  Flowsheets (Taken 10/4/2023 1231)  Verbalizes/displays adequate comfort level or baseline comfort level:   Encourage patient to monitor pain and request assistance   Assess pain using appropriate pain scale   Administer analgesics based on type and severity of pain and evaluate response   Implement non-pharmacological measures as

## 2023-10-05 NOTE — CARE COORDINATION
10/5/23, 1:27 PM EDT    DISCHARGE ON GOING EVALUATION    formerly [facility] day: 2  Location: 6A-10/010-A Reason for admit: Diverticulitis [K57.92]  Diverticulitis of colon [K57.32]   Procedure:   10/3 CT abd/pelvis: Inflammatory process posterior to the sigmoid colon presumably related to diverticulitis. The appearance is raises the possibility of an early/developing fistula recommend follow-up. Barriers to Discharge: Hospitalist and Surgery following. Nursing reports pt continues to complain of abd pain and taking iv morphine, but also complaining of headache. Also wants to eat. IVF. Lovenox. Zosyn iv q8hr. Pain meds changed to po Ultram and Fioricet. PCP: Michael CANNON  Readmission Risk Score: 13.1%  Patient Goals/Plan/Treatment Preferences: Plans home with mother. Monitor for needs.

## 2023-10-05 NOTE — PROGRESS NOTES
Hospitalist Progress Note    Patient:  Justina Amin      Unit/Bed:6A-10/010-A    YOB: 1995    MRN: [id number]       Acct: [de-identified]     PCP: Sabrina Roca    Date of Admission: 10/3/2023    Assessment/Plan:    Diverticulitis of sigmoid colon  Findings consistent with CT and past hx   Continue zosyn - transition to oral on dc   Pain meds de-escalated today   Full liquid diet   Senna added for bowel regimen   General surgery consulted given concerns for possible fistula- no surgical intervention as no fistula seen- follow up OP  WBC normal- trend. VSS- no signs of sepsis or acute abdomen     POTS  Takes florinef and midodrine qhs      PTSD/anxiety and depression   risperidone and atarax - also venlofaxine      Nicotine dependence 2/2 vaping   Nicotine patch ordered  Patient found with vape in hand- educated him on no smoking policy      Expected discharge date:  10/7    Disposition:    [x] Home       [] TCU       [] Rehab       [] Psych       [] SNF       [] [address]       [] Other-    Chief Complaint: Abdominal pain     Hospital Course:   Initial HPI per chart review   \"Stanislav Conway is a 33 y.o. male who presents to [facility] with abdominal pain. Patient states he has multiple episodes of diverticulitis in the recent past but states this has been the worst. He states this episode began two weeks ago with constant severe pain across the lower abdomen with a focus on the left lower quadrant. Patient also endorses some nausea with one episode of emesis. Denies black, tarry or rosita bloody stools. The patient has had PO courses of abx including a recent course of metronidazole and clindamycin. The patient has a diagnosis of POTS following a COVID infection over a year ago. \"    10/5  Patient states pain is mildly improved today. Continues to request IV pain meds but also wants to eat. Will trial oral meds and full liquid diet.         Medications:

## 2023-10-06 VITALS
OXYGEN SATURATION: 96 % | WEIGHT: 266.4 LBS | DIASTOLIC BLOOD PRESSURE: 81 MMHG | BODY MASS INDEX: 37.3 KG/M2 | HEIGHT: 71 IN | RESPIRATION RATE: 18 BRPM | TEMPERATURE: 97.6 F | HEART RATE: 68 BPM | SYSTOLIC BLOOD PRESSURE: 111 MMHG

## 2023-10-06 PROCEDURE — 6370000000 HC RX 637 (ALT 250 FOR IP): Performed by: PHYSICIAN ASSISTANT

## 2023-10-06 PROCEDURE — 2580000003 HC RX 258: Performed by: PHYSICIAN ASSISTANT

## 2023-10-06 PROCEDURE — 99239 HOSP IP/OBS DSCHRG MGMT >30: CPT | Performed by: PHYSICIAN ASSISTANT

## 2023-10-06 PROCEDURE — 6360000002 HC RX W HCPCS: Performed by: PHYSICIAN ASSISTANT

## 2023-10-06 RX ORDER — TRAMADOL HYDROCHLORIDE 50 MG/1
50 TABLET ORAL EVERY 6 HOURS PRN
Qty: 12 TABLET | Refills: 0 | Status: SHIPPED | OUTPATIENT
Start: 2023-10-06 | End: 2023-10-09

## 2023-10-06 RX ORDER — FAMOTIDINE 20 MG/1
20 TABLET, FILM COATED ORAL 2 TIMES DAILY
Qty: 60 TABLET | Refills: 3 | Status: SHIPPED | OUTPATIENT
Start: 2023-10-06

## 2023-10-06 RX ORDER — AMOXICILLIN AND CLAVULANATE POTASSIUM 875; 125 MG/1; MG/1
1 TABLET, FILM COATED ORAL EVERY 8 HOURS SCHEDULED
Qty: 30 TABLET | Refills: 0 | Status: SHIPPED | OUTPATIENT
Start: 2023-10-06 | End: 2023-10-16

## 2023-10-06 RX ORDER — SENNA AND DOCUSATE SODIUM 50; 8.6 MG/1; MG/1
2 TABLET, FILM COATED ORAL 2 TIMES DAILY
COMMUNITY
Start: 2023-10-06

## 2023-10-06 RX ORDER — CALCIUM CARBONATE 500 MG/1
500 TABLET, CHEWABLE ORAL 3 TIMES DAILY PRN
Status: DISCONTINUED | OUTPATIENT
Start: 2023-10-06 | End: 2023-10-06 | Stop reason: HOSPADM

## 2023-10-06 RX ORDER — CALCIUM CARBONATE 500 MG/1
500 TABLET, CHEWABLE ORAL 3 TIMES DAILY PRN
COMMUNITY
Start: 2023-10-06 | End: 2023-11-05

## 2023-10-06 RX ADMIN — SENNOSIDES AND DOCUSATE SODIUM 2 TABLET: 50; 8.6 TABLET ORAL at 09:16

## 2023-10-06 RX ADMIN — ANTACID TABLETS 500 MG: 500 TABLET, CHEWABLE ORAL at 11:33

## 2023-10-06 RX ADMIN — ANTACID TABLETS 500 MG: 500 TABLET, CHEWABLE ORAL at 05:38

## 2023-10-06 RX ADMIN — TRAMADOL HYDROCHLORIDE 100 MG: 50 TABLET, COATED ORAL at 11:38

## 2023-10-06 RX ADMIN — PIPERACILLIN AND TAZOBACTAM 3375 MG: 3; .375 INJECTION, POWDER, LYOPHILIZED, FOR SOLUTION INTRAVENOUS at 05:39

## 2023-10-06 RX ADMIN — TRAMADOL HYDROCHLORIDE 50 MG: 50 TABLET, COATED ORAL at 04:25

## 2023-10-06 ASSESSMENT — PAIN SCALES - GENERAL
PAINLEVEL_OUTOF10: 5
PAINLEVEL_OUTOF10: 4
PAINLEVEL_OUTOF10: 7
PAINLEVEL_OUTOF10: 6

## 2023-10-06 ASSESSMENT — PAIN DESCRIPTION - DESCRIPTORS
DESCRIPTORS: ACHING
DESCRIPTORS: THROBBING
DESCRIPTORS: SORE

## 2023-10-06 ASSESSMENT — PAIN DESCRIPTION - ORIENTATION
ORIENTATION: LEFT;UPPER
ORIENTATION: LOWER;LEFT
ORIENTATION: LOWER

## 2023-10-06 ASSESSMENT — PAIN DESCRIPTION - PAIN TYPE: TYPE: ACUTE PAIN

## 2023-10-06 ASSESSMENT — PAIN DESCRIPTION - LOCATION
LOCATION: ABDOMEN

## 2023-10-06 ASSESSMENT — PAIN DESCRIPTION - FREQUENCY: FREQUENCY: INTERMITTENT

## 2023-10-06 ASSESSMENT — PAIN DESCRIPTION - ONSET: ONSET: AWAKENED FROM SLEEP

## 2023-10-06 NOTE — PLAN OF CARE
Problem: Discharge Planning  Goal: Discharge to home or other facility with appropriate resources  10/6/2023 1059 by Nathan Alvarez RN  Flowsheets (Taken 10/5/2023 2013 by Demian Ruiz RN)  Discharge to home or other facility with appropriate resources:   Identify barriers to discharge with patient and caregiver   Arrange for needed discharge resources and transportation as appropriate   Identify discharge learning needs (meds, wound care, etc)   Refer to discharge planning if patient needs post-hospital services based on physician order or complex needs related to functional status, cognitive ability or social support system  10/5/2023 2309 by Demian Ruiz RN  Outcome: Progressing  Flowsheets (Taken 10/5/2023 2013)  Discharge to home or other facility with appropriate resources:   Identify barriers to discharge with patient and caregiver   Arrange for needed discharge resources and transportation as appropriate   Identify discharge learning needs (meds, wound care, etc)   Refer to discharge planning if patient needs post-hospital services based on physician order or complex needs related to functional status, cognitive ability or social support system     Problem: Pain  Goal: Verbalizes/displays adequate comfort level or baseline comfort level  10/6/2023 1059 by Nathan Alvarez RN  Flowsheets (Taken 10/5/2023 2013 by Demian Ruiz RN)  Verbalizes/displays adequate comfort level or baseline comfort level:   Encourage patient to monitor pain and request assistance   Assess pain using appropriate pain scale   Administer analgesics based on type and severity of pain and evaluate response   Implement non-pharmacological measures as appropriate and evaluate response   Consider cultural and social influences on pain and pain management  10/5/2023 2309 by Demian Ruiz RN  Outcome: Progressing  Flowsheets (Taken 10/5/2023 2013)  Verbalizes/displays adequate comfort level or baseline comfort level: Encourage patient to monitor pain and request assistance   Assess pain using appropriate pain scale   Administer analgesics based on type and severity of pain and evaluate response   Implement non-pharmacological measures as appropriate and evaluate response   Consider cultural and social influences on pain and pain management     Problem: ABCDS Injury Assessment  Goal: Absence of physical injury  10/6/2023 1059 by Dequan Galaviz RN  [employer] (Taken 10/5/2023 2309 by Mary Gordon, RN)  Absence of Physical Injury: Implement safety measures based on patient assessment  10/5/2023 2309 by Mary Gordon RN  Outcome: Progressing  Flowsheets (Taken 10/5/2023 2309)  Absence of Physical Injury: Implement safety measures based on patient assessment     Problem: Gastrointestinal - Adult  Goal: Minimal or absence of nausea and vomiting  10/6/2023 1059 by Dequan Galaviz RN  Flowsheets (Taken 10/5/2023 2013 by Mary Gordon, RN)  Minimal or absence of nausea and vomiting:   Administer IV fluids as ordered to ensure adequate hydration   Administer ordered antiemetic medications as needed   Provide nonpharmacologic comfort measures as appropriate   Advance diet as tolerated, if ordered  10/5/2023 2309 by Mary Gordon, RN  Outcome: Progressing  [employer] (Taken 10/5/2023 2013)  Minimal or absence of nausea and vomiting:   Administer IV fluids as ordered to ensure adequate hydration   Administer ordered antiemetic medications as needed   Provide nonpharmacologic comfort measures as appropriate   Advance diet as tolerated, if ordered  Goal: Maintains or returns to baseline bowel function  10/6/2023 1059 by Dequan Galaviz RN  [employer] (Taken 10/5/2023 2013 by Mary Gordon, RN)  Maintains or returns to baseline bowel function:   Assess bowel function   Encourage oral fluids to ensure adequate hydration   Administer IV fluids as ordered to ensure adequate hydration   Administer ordered medications as needed

## 2023-10-06 NOTE — DISCHARGE SUMMARY
Hospital Medicine Discharge Summary      Patient Identification:   Konrad Meadows   : 1995  MRN: [id number]   Account: [de-identified]      Patient's PCP: Abdifatah Tyson Date: 10/3/2023     Discharge Date:   10/6/2023    Admitting Physician: Thierry Brown MD     Discharging Physician Assistant: Fuad Rahman PA-C     Discharge Diagnoses with Assessment/Plan:    Diverticulitis of sigmoid colon  Findings consistent with CT and past hx   Treated with zosyn while IP- dc with 10 day course of augmentin  Low residue diet   Senna added for bowel regimen   General surgery consulted given concerns for possible fistula- no surgical intervention as no fistula seen- follow up OP  WBC normal- trend. VSS- no signs of sepsis or acute abdomen      POTS  Takes florinef and midodrine qhs      PTSD/anxiety and depression   risperidone and atarax - also venlofaxine      Nicotine dependence 2/2 vaping   Nicotine patch ordered  Patient found with vape in hand- educated him on no smoking policy    The patient was seen and examined on day of discharge and this discharge summary is in conjunction with any daily progress note from day of discharge. Hospital Course:   Initial HPI per chart review   \"Alexander Severa Shipper is a 33 y.o. male who presents to [facility] with abdominal pain. Patient states he has multiple episodes of diverticulitis in the recent past but states this has been the worst. He states this episode began two weeks ago with constant severe pain across the lower abdomen with a focus on the left lower quadrant. Patient also endorses some nausea with one episode of emesis. Denies black, tarry or rosita bloody stools. The patient has had PO courses of abx including a recent course of metronidazole and clindamycin. The patient has a diagnosis of POTS following a COVID infection over a year ago. \"     10/5  Patient states pain is mildly improved today.   Continues to request IV pain meds but also

## 2023-10-06 NOTE — CARE COORDINATION
10/6/23, 11:46 AM EDT    DISCHARGE ON GOING EVALUATION    [facility] day: 3  Location: 6A-10/010-A Reason for admit: Diverticulitis [K57.92]  Diverticulitis of colon [K57.32]   Procedure:   10/3 CT abd/pelvis: Inflammatory process posterior to the sigmoid colon presumably related to diverticulitis. The appearance is raises the possibility of an early/developing fistula recommend follow-up. Barriers to Discharge: Hospitalist and Surgery following. IVF. Lovenox. Midodrine. Zosyn iv q8hr. Senokot. Pain control. Desyrel. Effexor. Diet order to increase to full liquid, low fiber diet yesterday. Hoping to increase today and then discharge this weekend. PCP: Ronda CANNON  Readmission Risk Score: 12.5%  Patient Goals/Plan/Treatment Preferences: Plans home with mother. Monitor for needs.

## 2023-10-06 NOTE — PROGRESS NOTES
Discharge instructions gone over with patient, including diverticulitis diet,  follow up appointments, meds called into pharmacy.   Patient voiced understanding,  no concerns noted at this time

## 2023-10-06 NOTE — PLAN OF CARE
Problem: Discharge Planning  Goal: Discharge to home or other facility with appropriate resources  Outcome: Progressing  Flowsheets (Taken 10/5/2023 2013)  Discharge to home or other facility with appropriate resources:   Identify barriers to discharge with patient and caregiver   Arrange for needed discharge resources and transportation as appropriate   Identify discharge learning needs (meds, wound care, etc)   Refer to discharge planning if patient needs post-hospital services based on physician order or complex needs related to functional status, cognitive ability or social support system     Problem: Pain  Goal: Verbalizes/displays adequate comfort level or baseline comfort level  Outcome: Progressing  Flowsheets (Taken 10/5/2023 2013)  Verbalizes/displays adequate comfort level or baseline comfort level:   Encourage patient to monitor pain and request assistance   Assess pain using appropriate pain scale   Administer analgesics based on type and severity of pain and evaluate response   Implement non-pharmacological measures as appropriate and evaluate response   Consider cultural and social influences on pain and pain management     Problem: ABCDS Injury Assessment  Goal: Absence of physical injury  Outcome: Progressing  Flowsheets (Taken 10/5/2023 2300)  Absence of Physical Injury: Implement safety measures based on patient assessment     Problem: Gastrointestinal - Adult  Goal: Minimal or absence of nausea and vomiting  Outcome: Progressing  Flowsheets (Taken 10/5/2023 2013)  Minimal or absence of nausea and vomiting:   Administer IV fluids as ordered to ensure adequate hydration   Administer ordered antiemetic medications as needed   Provide nonpharmacologic comfort measures as appropriate   Advance diet as tolerated, if ordered     Problem: Gastrointestinal - Adult  Goal: Maintains or returns to baseline bowel function  Outcome: Progressing  Flowsheets (Taken 10/5/2023 2013)  Maintains or returns to baseline bowel function:   Assess bowel function   Encourage oral fluids to ensure adequate hydration   Administer IV fluids as ordered to ensure adequate hydration   Administer ordered medications as needed   Encourage mobilization and activity     Problem: Gastrointestinal - Adult  Goal: Maintains adequate nutritional intake  Outcome: Progressing  Flowsheets (Taken 10/5/2023 2013)  Maintains adequate nutritional intake:   Monitor percentage of each meal consumed   Identify factors contributing to decreased intake, treat as appropriate   Assist with meals as needed   Monitor intake and output, weight and lab values     Problem: Metabolic/Fluid and Electrolytes - Adult  Goal: Electrolytes maintained within normal limits  Outcome: Progressing  Flowsheets (Taken 10/5/2023 2013)  Electrolytes maintained within normal limits:   Monitor labs and assess patient for signs and symptoms of electrolyte imbalances   Administer electrolyte replacement as ordered   Monitor response to electrolyte replacements, including repeat lab results as appropriate   Care plan reviewed with patient. Patient verbalizes understanding of the care plan and contributed to goal setting.

## 2023-10-25 ENCOUNTER — TELEPHONE (OUTPATIENT)
Dept: SURGERY | Age: 28
End: 2023-10-25

## 2023-10-25 ENCOUNTER — OFFICE VISIT (OUTPATIENT)
Dept: SURGERY | Age: 28
End: 2023-10-25
Payer: COMMERCIAL

## 2023-10-25 VITALS
RESPIRATION RATE: 18 BRPM | DIASTOLIC BLOOD PRESSURE: 70 MMHG | BODY MASS INDEX: 38.08 KG/M2 | HEART RATE: 67 BPM | WEIGHT: 272 LBS | OXYGEN SATURATION: 97 % | HEIGHT: 71 IN | SYSTOLIC BLOOD PRESSURE: 124 MMHG | TEMPERATURE: 97.2 F

## 2023-10-25 DIAGNOSIS — K57.20 DIVERTICULITIS OF LARGE INTESTINE WITH PERFORATION WITHOUT BLEEDING: ICD-10-CM

## 2023-10-25 PROCEDURE — 99204 OFFICE O/P NEW MOD 45 MIN: CPT | Performed by: SURGERY

## 2023-10-25 PROCEDURE — 3078F DIAST BP <80 MM HG: CPT | Performed by: SURGERY

## 2023-10-25 PROCEDURE — 3074F SYST BP LT 130 MM HG: CPT | Performed by: SURGERY

## 2023-10-25 ASSESSMENT — ENCOUNTER SYMPTOMS
SINUS PRESSURE: 0
ANAL BLEEDING: 0
APNEA: 0
ABDOMINAL PAIN: 1
SHORTNESS OF BREATH: 0
COUGH: 0
DIARRHEA: 0
STRIDOR: 0
TROUBLE SWALLOWING: 0
EYE DISCHARGE: 0
CHOKING: 0
EYE PAIN: 0
BACK PAIN: 0
RHINORRHEA: 0
VOICE CHANGE: 0
FACIAL SWELLING: 0
EYE REDNESS: 0
COLOR CHANGE: 0
BLOOD IN STOOL: 0
SORE THROAT: 0
NAUSEA: 0
RECTAL PAIN: 0
VOMITING: 0
PHOTOPHOBIA: 0
CHEST TIGHTNESS: 0
SINUS PAIN: 0
WHEEZING: 0
ABDOMINAL DISTENTION: 1
CONSTIPATION: 0
EYE ITCHING: 0

## 2023-10-25 NOTE — TELEPHONE ENCOUNTER
Patient scheduled for surgery with Dr. Nuzhat Salmon on 11- at 9:30am with an arrival of 7:30am.  Preop surgery instructions and bowel prep given to patient. Surgery consent signed. Antibacterial soap given. Patient with POTS, so will get okay from Dr. Letha Sanches in [facility].

## 2023-10-25 NOTE — PROGRESS NOTES
Subjective:      Patient ID: Luan Canales is a 33 y.o. male. HPI  Chief Complaint   Patient presents with    Follow-Up from Hospital     Diverticulitis. Review of Systems   Constitutional:  Positive for activity change, appetite change and fatigue. Negative for chills, diaphoresis, fever and unexpected weight change. HENT:  Negative for congestion, dental problem, drooling, ear discharge, ear pain, facial swelling, hearing loss, mouth sores, nosebleeds, postnasal drip, rhinorrhea, sinus pressure, sinus pain, sneezing, sore throat, tinnitus, trouble swallowing and voice change. Eyes:  Negative for photophobia, pain, discharge, redness, itching and visual disturbance. Respiratory:  Negative for apnea, cough, choking, chest tightness, shortness of breath, wheezing and stridor. Cardiovascular:  Negative for chest pain, palpitations and leg swelling. Gastrointestinal:  Positive for abdominal distention and abdominal pain. Negative for anal bleeding, blood in stool, constipation, diarrhea, nausea, rectal pain and vomiting. Endocrine: Negative for cold intolerance, heat intolerance, polydipsia, polyphagia and polyuria. Genitourinary:  Negative for decreased urine volume, difficulty urinating, dysuria, enuresis, flank pain, frequency, genital sores, hematuria, penile discharge, penile pain, penile swelling, scrotal swelling, testicular pain and urgency. Musculoskeletal:  Negative for arthralgias, back pain, gait problem, joint swelling, myalgias, neck pain and neck stiffness. Skin:  Negative for color change, pallor and rash. Allergic/Immunologic: Negative for environmental allergies, food allergies and immunocompromised state. Neurological:  Negative for dizziness, tremors, seizures, syncope, facial asymmetry, speech difficulty, weakness, light-headedness, numbness and headaches. Hematological:  Negative for adenopathy. Does not bruise/bleed easily.    Psychiatric/Behavioral:  Negative
Diarrhea, fever
without hepatosplenomegally. Tenderness: absent. RECTAL: deferred, not clinically indicated  NEUROLOGIC: There are no focalizing motor or sensory deficits. CN II-XII are grossly intact. [facility]    EXTREMITIES: no cyanosis, no clubbing, and no edema                     Electronically signed by Timothy Sarkar MD MD FACS on 10/25/2023 at 9:14 AM

## 2023-10-25 NOTE — TELEPHONE ENCOUNTER
Robotic Surgery Scheduling Form   [facility] [address], [address] [facility]    Phone * [phone]   Surgical Scheduling Direct line Phone * [phone]  Fax * [phone]      Luan Ally      1995    male    [address] [address] [address] [place] [address]   Marital Status:    Single     Home Phone: [phone]   Cell Phone:   Telephone Information:   Mobile [phone]              Surgeon: Dr. Fernie Kumar  Surgery Date:11-   Time: 9:30am     Procedure: Robotic assisted sigmoid colectomy possible open colectomy  Inpatient      Diagnosis: Diverticulitis    Important Medical History: In Epic    Special Inst/Equip:     CPT Codes: 79159    Latex Allergy:   no Cardiac Device:  no    Anesthesia Type: General    Case Location:  Main OR     Preadmission Testing: Phone Call      PAT Date and Time: ________________________________    PAT Confirmation #: _________________________________    Post Op Visit:  ______________________________________    Need Preop Cardiac Clearance:   yes,     Does patient have Cardiologist/physician? Dr. Triston Lacy #:  ______________________________________________    Keesha Deano: __________________________________ Date:____________________      RNFA (colon resection only):       Insurance Company Name:  [employer]

## 2023-11-02 ENCOUNTER — TELEPHONE (OUTPATIENT)
Dept: SURGERY | Age: 28
End: 2023-11-02

## 2023-11-02 RX ORDER — SILDENAFIL 100 MG/1
100 TABLET, FILM COATED ORAL PRN
COMMUNITY

## 2023-11-02 RX ORDER — RISPERIDONE 0.5 MG/1
0.5 TABLET ORAL 2 TIMES DAILY
COMMUNITY

## 2023-11-02 RX ORDER — VENLAFAXINE HYDROCHLORIDE 150 MG/1
150 CAPSULE, EXTENDED RELEASE ORAL NIGHTLY
COMMUNITY

## 2023-11-02 RX ORDER — METOPROLOL SUCCINATE 25 MG/1
25 TABLET, EXTENDED RELEASE ORAL DAILY
COMMUNITY

## 2023-11-02 RX ORDER — LANOLIN ALCOHOL/MO/W.PET/CERES
2 CREAM (GRAM) TOPICAL NIGHTLY PRN
COMMUNITY

## 2023-11-02 RX ORDER — PROPRANOLOL HYDROCHLORIDE 10 MG/1
10 TABLET ORAL 2 TIMES DAILY
COMMUNITY

## 2023-11-02 RX ORDER — BUSPIRONE HYDROCHLORIDE 10 MG/1
10 TABLET ORAL 2 TIMES DAILY
COMMUNITY

## 2023-11-03 ENCOUNTER — TELEPHONE (OUTPATIENT)
Dept: SURGERY | Age: 28
End: 2023-11-03

## 2023-11-03 NOTE — TELEPHONE ENCOUNTER
Pt called stating he contacted his PCP for anxiety meds before and after surgery but they denied to send any in d/t pt has a psychiatrist.  He contacted that provider and they also denied and Rx d/t pt is all ready on anxiety meds. Please advise.

## 2023-11-04 ENCOUNTER — HOSPITAL ENCOUNTER (EMERGENCY)
Age: 28
Discharge: HOME | End: 2023-11-04
Attending: EMERGENCY MEDICINE
Payer: COMMERCIAL

## 2023-11-04 ENCOUNTER — APPOINTMENT (OUTPATIENT)
Dept: CT IMAGING | Age: 28
End: 2023-11-04
Payer: COMMERCIAL

## 2023-11-04 VITALS
BODY MASS INDEX: 38.63 KG/M2 | HEART RATE: 62 BPM | TEMPERATURE: 98 F | RESPIRATION RATE: 19 BRPM | OXYGEN SATURATION: 97 % | SYSTOLIC BLOOD PRESSURE: 132 MMHG | DIASTOLIC BLOOD PRESSURE: 65 MMHG | WEIGHT: 277 LBS

## 2023-11-04 DIAGNOSIS — K57.32 SIGMOID DIVERTICULITIS: Primary | ICD-10-CM

## 2023-11-04 LAB
ALBUMIN SERPL BCG-MCNC: 4.2 G/DL (ref 3.5–5.1)
ALP SERPL-CCNC: 57 U/L (ref 38–126)
ALT SERPL W/O P-5'-P-CCNC: 19 U/L (ref 11–66)
ANION GAP SERPL CALC-SCNC: 12 MEQ/L (ref 8–16)
AST SERPL-CCNC: 16 U/L (ref 5–40)
BASOPHILS ABSOLUTE: 0 THOU/MM3 (ref 0–0.1)
BASOPHILS NFR BLD AUTO: 0.5 %
BILIRUB SERPL-MCNC: 0.5 MG/DL (ref 0.3–1.2)
BUN SERPL-MCNC: 15 MG/DL (ref 7–22)
CALCIUM SERPL-MCNC: 9.3 MG/DL (ref 8.5–10.5)
CHLORIDE SERPL-SCNC: 102 MEQ/L (ref 98–111)
CO2 SERPL-SCNC: 25 MEQ/L (ref 23–33)
CREAT SERPL-MCNC: 1 MG/DL (ref 0.4–1.2)
DEPRECATED RDW RBC AUTO: 36.7 FL (ref 35–45)
EOSINOPHIL NFR BLD AUTO: 1 %
EOSINOPHILS ABSOLUTE: 0.1 THOU/MM3 (ref 0–0.4)
ERYTHROCYTE [DISTWIDTH] IN BLOOD BY AUTOMATED COUNT: 12.3 % (ref 11.5–14.5)
GFR SERPL CREATININE-BSD FRML MDRD: > 60 ML/MIN/1.73M2
GLUCOSE SERPL-MCNC: 136 MG/DL (ref 70–108)
HCT VFR BLD AUTO: 42.5 % (ref 42–52)
HGB BLD-MCNC: 14.8 GM/DL (ref 14–18)
IMM GRANULOCYTES # BLD AUTO: 0.03 THOU/MM3 (ref 0–0.07)
IMM GRANULOCYTES NFR BLD AUTO: 0.3 %
LIPASE SERPL-CCNC: 28.3 U/L (ref 5.6–51.3)
LYMPHOCYTES ABSOLUTE: 2.3 THOU/MM3 (ref 1–4.8)
LYMPHOCYTES NFR BLD AUTO: 23.7 %
MCH RBC QN AUTO: 29.5 PG (ref 26–33)
MCHC RBC AUTO-ENTMCNC: 34.8 GM/DL (ref 32.2–35.5)
MCV RBC AUTO: 84.7 FL (ref 80–94)
MONOCYTES ABSOLUTE: 0.6 THOU/MM3 (ref 0.4–1.3)
MONOCYTES NFR BLD AUTO: 6.2 %
NEUTROPHILS NFR BLD AUTO: 68.3 %
NRBC BLD AUTO-RTO: 0 /100 WBC
OSMOLALITY SERPL CALC.SUM OF ELEC: 280.5 MOSMOL/KG (ref 275–300)
PLATELET # BLD AUTO: 199 THOU/MM3 (ref 130–400)
PMV BLD AUTO: 11.4 FL (ref 9.4–12.4)
POTASSIUM SERPL-SCNC: 3.8 MEQ/L (ref 3.5–5.2)
PROT SERPL-MCNC: 7.1 G/DL (ref 6.1–8)
RBC # BLD AUTO: 5.02 MILL/MM3 (ref 4.7–6.1)
REASON FOR REJECTION: NORMAL
REJECTED TEST: NORMAL
SEGMENTED NEUTROPHILS ABSOLUTE COUNT: 6.6 THOU/MM3 (ref 1.8–7.7)
SODIUM SERPL-SCNC: 139 MEQ/L (ref 135–145)
WBC # BLD AUTO: 9.6 THOU/MM3 (ref 4.8–10.8)

## 2023-11-04 PROCEDURE — 96365 THER/PROPH/DIAG IV INF INIT: CPT

## 2023-11-04 PROCEDURE — 6360000002 HC RX W HCPCS: Performed by: EMERGENCY MEDICINE

## 2023-11-04 PROCEDURE — 74177 CT ABD & PELVIS W/CONTRAST: CPT

## 2023-11-04 PROCEDURE — 36415 COLL VENOUS BLD VENIPUNCTURE: CPT

## 2023-11-04 PROCEDURE — 85025 COMPLETE CBC W/AUTO DIFF WBC: CPT

## 2023-11-04 PROCEDURE — 96375 TX/PRO/DX INJ NEW DRUG ADDON: CPT

## 2023-11-04 PROCEDURE — 83690 ASSAY OF LIPASE: CPT

## 2023-11-04 PROCEDURE — 2580000003 HC RX 258: Performed by: EMERGENCY MEDICINE

## 2023-11-04 PROCEDURE — 6360000004 HC RX CONTRAST MEDICATION: Performed by: EMERGENCY MEDICINE

## 2023-11-04 PROCEDURE — 6370000000 HC RX 637 (ALT 250 FOR IP): Performed by: EMERGENCY MEDICINE

## 2023-11-04 PROCEDURE — 80053 COMPREHEN METABOLIC PANEL: CPT

## 2023-11-04 PROCEDURE — 99285 EMERGENCY DEPT VISIT HI MDM: CPT

## 2023-11-04 RX ORDER — METRONIDAZOLE 500 MG/1
500 TABLET ORAL 3 TIMES DAILY
Qty: 42 TABLET | Refills: 0 | Status: SHIPPED | OUTPATIENT
Start: 2023-11-04 | End: 2023-11-18

## 2023-11-04 RX ORDER — CIPROFLOXACIN 500 MG/1
500 TABLET, FILM COATED ORAL ONCE
Status: COMPLETED | OUTPATIENT
Start: 2023-11-04 | End: 2023-11-04

## 2023-11-04 RX ORDER — METRONIDAZOLE 500 MG/100ML
500 INJECTION, SOLUTION INTRAVENOUS ONCE
Status: COMPLETED | OUTPATIENT
Start: 2023-11-04 | End: 2023-11-04

## 2023-11-04 RX ORDER — SODIUM CHLORIDE 9 MG/ML
INJECTION, SOLUTION INTRAVENOUS CONTINUOUS
Status: DISCONTINUED | OUTPATIENT
Start: 2023-11-04 | End: 2023-11-04 | Stop reason: HOSPADM

## 2023-11-04 RX ORDER — CIPROFLOXACIN 500 MG/1
500 TABLET, FILM COATED ORAL 2 TIMES DAILY
Qty: 28 TABLET | Refills: 0 | Status: SHIPPED | OUTPATIENT
Start: 2023-11-04 | End: 2023-11-18

## 2023-11-04 RX ORDER — MORPHINE SULFATE 2 MG/ML
2 INJECTION, SOLUTION INTRAMUSCULAR; INTRAVENOUS ONCE
Status: COMPLETED | OUTPATIENT
Start: 2023-11-04 | End: 2023-11-04

## 2023-11-04 RX ADMIN — CIPROFLOXACIN 500 MG: 500 TABLET, FILM COATED ORAL at 17:21

## 2023-11-04 RX ADMIN — IOPAMIDOL 80 ML: 755 INJECTION, SOLUTION INTRAVENOUS at 16:27

## 2023-11-04 RX ADMIN — METRONIDAZOLE 500 MG: 500 INJECTION, SOLUTION INTRAVENOUS at 17:21

## 2023-11-04 RX ADMIN — SODIUM CHLORIDE: 9 INJECTION, SOLUTION INTRAVENOUS at 14:45

## 2023-11-04 RX ADMIN — MORPHINE SULFATE 2 MG: 2 INJECTION, SOLUTION INTRAMUSCULAR; INTRAVENOUS at 17:21

## 2023-11-04 ASSESSMENT — PAIN - FUNCTIONAL ASSESSMENT: PAIN_FUNCTIONAL_ASSESSMENT: 0-10

## 2023-11-04 ASSESSMENT — PAIN SCALES - GENERAL: PAINLEVEL_OUTOF10: 2

## 2023-11-04 NOTE — ED NOTES
Patient resting in bed. Respirations easy and unlabored. No distress noted. Call light within reach.        Mulugeta Harper RN  11/04/23 [id number]

## 2023-11-04 NOTE — ED TRIAGE NOTES
Pt arrives to ED from home with c/o diverticulitis flare up. Pt states he has surgery scheduled for a few weeks. But states they told him to come to ED if symptoms do not improve.  Pt states he has diarrhea and abdominal pain

## 2023-11-04 NOTE — ED PROVIDER NOTES
[facility] EMERGENCY DEPT      EMERGENCY MEDICINE     Room # 41/041A    Pt Name: Robert Dee  MRN: [id number]  [address] 1995  Date of evaluation: 11/4/2023  Provider: Meek Chou MD    CHIEF COMPLAINT       Chief Complaint   Patient presents with    GI Problem    Diarrhea     HISTORY OF PRESENT ILLNESS   Robert Dee is a pleasant 33 y.o. male who presents to the emergency department from from home, as a walk in to the ED lobby for evaluation of diarrhea and lower abdominal pain for the past 2 days. The patient has a known history of diverticulitis 3 times this year and scheduled for a sigmoid colectomy as there is a concern about possible fistula by Dr. Sophia Franks 11/17/2023. The patient last flareup was 10/3/2023, admitted till 10/6/23. The patient went home with the Augmentin which he took it for 10 days. The patient been doing well until 2 days ago when the patient started to have throbbing pain in the rectal area with diarrhea twice a day with nonmucous and blood streak. Patient also started to have pain in the left lower quadrant and going to the back yesterday. Did not have any fever no chills no shortness of breath or chest pain    PASTMEDICAL HISTORY     Past Medical History:   Diagnosis Date    Anxiety 7/7/2023    Post traumatic stress disorder (PTSD) 7/7/2023    Postural orthostatic tachycardia syndrome (POTS) 7/7/2023    Primary hypertension 7/7/2023    Vapes nicotine containing substance        Patient Active Problem List   Diagnosis Code    Acute diverticulitis K57.92    Anxiety F41.9    Postural orthostatic tachycardia syndrome (POTS) G90. A    Post traumatic stress disorder (PTSD) F43.10    Primary hypertension I10    Diverticulitis K57.92    Lower abdominal pain R10.30    Diverticulitis of colon K57.32    Diverticulitis of large intestine with perforation without bleeding K57.20     SURGICAL HISTORY       Past Surgical History:   Procedure Laterality Date    COLONOSCOPY N/A

## 2023-11-04 NOTE — ED NOTES
IVF fluids and antibiotics continue  Medicated per mar for pain   Patient resting in bed. Respirations easy and unlabored. No distress noted. Call light within reach.        Giovani Mccray RN  11/04/23 4796

## 2023-11-13 NOTE — H&P
H and P for [address] MD [facility]  General Surgery  New Patient Evaluation in Office  Pt Name: Pasquale Calvert  Date of Birth 1995   Today's Date: 11/13/2023  Medical Record Number: [id number]  Referring Provider: No ref. provider found  Primary Care Provider: Manuel Felix  No chief complaint on file. ASSESSMENT      Problem List Items Addressed This Visit    None      Active Hospital Problems    Diagnosis Date Noted    Diverticulitis of large intestine with perforation without bleeding [K57.20] 10/25/2023         PLANS        Plans reviewed with the patient who understands. All questions were answered. There are no outpatient Patient Instructions on file for this admission. Follow up: No follow-ups on file. Orders Placed This Encounter:  No orders of the defined types were placed in this encounter. Schedule Stanislav for robotic assisted sigmoid colectomy possible open colectomy Dr Pelayo Query  He said his meds got changed on yesterday by his behavioral health doctor and he would have to call us with his new med list.  Potential diagnostic and therapeutic modalities were discussed with the patient including surgical and nonsurgical options. The risks, complications and benefits of the options were discussed. Complications including, but not limited to, bleeding, wound infection, anastomotic leak, blood clots, bowel obstruction, other organ injury, intra-abdominal abscess, and need for colostomy were reviewed. The patient was given an opportunity to ask questions. Once answered, the patient is agreeable to proceed with surgery. I did discuss him the option of nonsurgery as well and that it is a 50-50 whether he has another episode of diverticulitis or not. I also discussed his risk of anastomotic leak estimated at 15 to 18% since he is a vape nicotine smoker.   We covered the different leaks the small localized 1 that may be managed by IV antibiotics versus the 3, no acute distress and cooperative to examination with proper mood and affect. SKIN: Skin color, texture, turgor normal. No rashes or lesions. He has piercings on both sides of the nose and through the septum in the low midline of his nose  LYMPH: no cervical nodes, no inguinal nodes  HEENT: Head is normocephalic, atraumatic. EOMI, PERRLA. NECK: Supple, symmetrical, trachea midline, no adenopathy, thyroid symmetric, not enlarged and no tenderness, skin normal.  CHEST/LUNGS: chest symmetric with normal A/P diameter, normal respiratory rate and rhythm, lungs clear to auscultation without wheezes, rales or rhonchi. No accessory muscle use. Scars None   CARDIOVASCULAR: Heart sounds are normal.  Regular rate and rhythm without murmur, gallop or rub. Normal S1 and S2. . Carotid and femoral pulses 2+/4 and equal bilaterally. ABDOMEN: Normal shape. No scar(s) present. Normal bowel sounds. No bruits. Constance Hamman \"soft, nontender, nondistended, no masses or organomegaly. no evidence of hernia. Percussion: Normal without hepatosplenomegally. Tenderness: absent. RECTAL: deferred, not clinically indicated  NEUROLOGIC: There are no focalizing motor or sensory deficits. CN II-XII are grossly intact. Constance Hamman    EXTREMITIES: no cyanosis, no clubbing, and no edema                     Electronically signed by Niyah Plata MD MD FACS on 11/13/2023 at 10:24 AM

## 2023-11-16 ENCOUNTER — TELEPHONE (OUTPATIENT)
Dept: SURGERY | Age: 28
End: 2023-11-16

## 2023-11-16 NOTE — TELEPHONE ENCOUNTER
Per [facility] CPT code 99474 pending clinical review. Pending Auth# [id number]. Clinical faxed.  Marked urgent as surgery tomorrow 11-

## 2023-11-17 ENCOUNTER — HOSPITAL ENCOUNTER (INPATIENT)
Age: 28
LOS: 20 days | Discharge: ANOTHER HOSPITAL | DRG: 231 | End: 2023-12-07
Attending: SURGERY | Admitting: SURGERY
Payer: COMMERCIAL

## 2023-11-17 ENCOUNTER — ANESTHESIA (OUTPATIENT)
Dept: OPERATING ROOM | Age: 28
End: 2023-11-17
Payer: COMMERCIAL

## 2023-11-17 ENCOUNTER — ANESTHESIA EVENT (OUTPATIENT)
Dept: OPERATING ROOM | Age: 28
End: 2023-11-17
Payer: COMMERCIAL

## 2023-11-17 DIAGNOSIS — K57.92 DIVERTICULITIS: ICD-10-CM

## 2023-11-17 DIAGNOSIS — Z93.3 COLOSTOMY IN PLACE (HCC): Primary | ICD-10-CM

## 2023-11-17 PROCEDURE — A4216 STERILE WATER/SALINE, 10 ML: HCPCS | Performed by: SURGERY

## 2023-11-17 PROCEDURE — C1713 ANCHOR/SCREW BN/BN,TIS/BN: HCPCS | Performed by: SURGERY

## 2023-11-17 PROCEDURE — 6360000002 HC RX W HCPCS: Performed by: SURGERY

## 2023-11-17 PROCEDURE — 3700000001 HC ADD 15 MINUTES (ANESTHESIA): Performed by: SURGERY

## 2023-11-17 PROCEDURE — 2500000003 HC RX 250 WO HCPCS

## 2023-11-17 PROCEDURE — 6360000002 HC RX W HCPCS: Performed by: REGISTERED NURSE

## 2023-11-17 PROCEDURE — 2500000003 HC RX 250 WO HCPCS: Performed by: REGISTERED NURSE

## 2023-11-17 PROCEDURE — C1889 IMPLANT/INSERT DEVICE, NOC: HCPCS | Performed by: SURGERY

## 2023-11-17 PROCEDURE — 88304 TISSUE EXAM BY PATHOLOGIST: CPT

## 2023-11-17 PROCEDURE — 3600000019 HC SURGERY ROBOT ADDTL 15MIN: Performed by: SURGERY

## 2023-11-17 PROCEDURE — 2709999900 HC NON-CHARGEABLE SUPPLY: Performed by: SURGERY

## 2023-11-17 PROCEDURE — 2580000003 HC RX 258: Performed by: SURGERY

## 2023-11-17 PROCEDURE — 0DTN4ZZ RESECTION OF SIGMOID COLON, PERCUTANEOUS ENDOSCOPIC APPROACH: ICD-10-PCS | Performed by: SURGERY

## 2023-11-17 PROCEDURE — 6360000002 HC RX W HCPCS: Performed by: ANESTHESIOLOGY

## 2023-11-17 PROCEDURE — S2900 ROBOTIC SURGICAL SYSTEM: HCPCS | Performed by: SURGERY

## 2023-11-17 PROCEDURE — 2500000003 HC RX 250 WO HCPCS: Performed by: ANESTHESIOLOGY

## 2023-11-17 PROCEDURE — 2720000010 HC SURG SUPPLY STERILE: Performed by: SURGERY

## 2023-11-17 PROCEDURE — 2500000003 HC RX 250 WO HCPCS: Performed by: SURGERY

## 2023-11-17 PROCEDURE — 6370000000 HC RX 637 (ALT 250 FOR IP): Performed by: SURGERY

## 2023-11-17 PROCEDURE — 1200000000 HC SEMI PRIVATE

## 2023-11-17 PROCEDURE — 3600000009 HC SURGERY ROBOT BASE: Performed by: SURGERY

## 2023-11-17 PROCEDURE — 44207 L COLECTOMY/COLOPROCTOSTOMY: CPT | Performed by: SURGERY

## 2023-11-17 PROCEDURE — 7100000001 HC PACU RECOVERY - ADDTL 15 MIN: Performed by: SURGERY

## 2023-11-17 PROCEDURE — 6360000002 HC RX W HCPCS

## 2023-11-17 PROCEDURE — 8E0W4CZ ROBOTIC ASSISTED PROCEDURE OF TRUNK REGION, PERCUTANEOUS ENDOSCOPIC APPROACH: ICD-10-PCS | Performed by: SURGERY

## 2023-11-17 PROCEDURE — 7100000000 HC PACU RECOVERY - FIRST 15 MIN: Performed by: SURGERY

## 2023-11-17 PROCEDURE — 88307 TISSUE EXAM BY PATHOLOGIST: CPT

## 2023-11-17 PROCEDURE — 3700000000 HC ANESTHESIA ATTENDED CARE: Performed by: SURGERY

## 2023-11-17 DEVICE — CLIP INT L POLYMER LOK LIG HEM O LOK (6EA/PK): Type: IMPLANTABLE DEVICE | Site: ABDOMEN | Status: FUNCTIONAL

## 2023-11-17 RX ORDER — SUCCINYLCHOLINE/SOD CL,ISO/PF 200MG/10ML
SYRINGE (ML) INTRAVENOUS PRN
Status: DISCONTINUED | OUTPATIENT
Start: 2023-11-17 | End: 2023-11-17 | Stop reason: SDUPTHER

## 2023-11-17 RX ORDER — MIDAZOLAM HYDROCHLORIDE 1 MG/ML
INJECTION INTRAMUSCULAR; INTRAVENOUS PRN
Status: DISCONTINUED | OUTPATIENT
Start: 2023-11-17 | End: 2023-11-17 | Stop reason: SDUPTHER

## 2023-11-17 RX ORDER — FENTANYL CITRATE 50 UG/ML
50 INJECTION, SOLUTION INTRAMUSCULAR; INTRAVENOUS EVERY 5 MIN PRN
Status: COMPLETED | OUTPATIENT
Start: 2023-11-17 | End: 2023-11-17

## 2023-11-17 RX ORDER — SODIUM CHLORIDE 0.9 % (FLUSH) 0.9 %
5-40 SYRINGE (ML) INJECTION EVERY 12 HOURS SCHEDULED
Status: DISCONTINUED | OUTPATIENT
Start: 2023-11-17 | End: 2023-11-17 | Stop reason: HOSPADM

## 2023-11-17 RX ORDER — ENOXAPARIN SODIUM 100 MG/ML
30 INJECTION SUBCUTANEOUS 2 TIMES DAILY
Status: DISCONTINUED | OUTPATIENT
Start: 2023-11-18 | End: 2023-11-20

## 2023-11-17 RX ORDER — OXYCODONE HYDROCHLORIDE 5 MG/1
10 TABLET ORAL EVERY 4 HOURS PRN
Status: DISCONTINUED | OUTPATIENT
Start: 2023-11-17 | End: 2023-11-24

## 2023-11-17 RX ORDER — FENTANYL CITRATE 50 UG/ML
INJECTION, SOLUTION INTRAMUSCULAR; INTRAVENOUS PRN
Status: DISCONTINUED | OUTPATIENT
Start: 2023-11-17 | End: 2023-11-17 | Stop reason: SDUPTHER

## 2023-11-17 RX ORDER — OXYCODONE HYDROCHLORIDE 5 MG/1
5 TABLET ORAL EVERY 4 HOURS PRN
Status: DISCONTINUED | OUTPATIENT
Start: 2023-11-17 | End: 2023-11-24

## 2023-11-17 RX ORDER — HYDROMORPHONE HYDROCHLORIDE 2 MG/ML
INJECTION, SOLUTION INTRAMUSCULAR; INTRAVENOUS; SUBCUTANEOUS PRN
Status: DISCONTINUED | OUTPATIENT
Start: 2023-11-17 | End: 2023-11-17 | Stop reason: SDUPTHER

## 2023-11-17 RX ORDER — KETOROLAC TROMETHAMINE 30 MG/ML
30 INJECTION, SOLUTION INTRAMUSCULAR; INTRAVENOUS EVERY 6 HOURS
Status: DISPENSED | OUTPATIENT
Start: 2023-11-17 | End: 2023-11-22

## 2023-11-17 RX ORDER — FENTANYL CITRATE 50 UG/ML
INJECTION, SOLUTION INTRAMUSCULAR; INTRAVENOUS
Status: COMPLETED
Start: 2023-11-17 | End: 2023-11-17

## 2023-11-17 RX ORDER — RISPERIDONE 0.25 MG/1
0.5 TABLET ORAL 2 TIMES DAILY
Status: DISCONTINUED | OUTPATIENT
Start: 2023-11-17 | End: 2023-12-07 | Stop reason: HOSPADM

## 2023-11-17 RX ORDER — DEXAMETHASONE SODIUM PHOSPHATE 4 MG/ML
8 INJECTION, SOLUTION INTRA-ARTICULAR; INTRALESIONAL; INTRAMUSCULAR; INTRAVENOUS; SOFT TISSUE ONCE
Status: COMPLETED | OUTPATIENT
Start: 2023-11-17 | End: 2023-11-17

## 2023-11-17 RX ORDER — ONDANSETRON 4 MG/1
4 TABLET, ORALLY DISINTEGRATING ORAL EVERY 8 HOURS PRN
Status: DISCONTINUED | OUTPATIENT
Start: 2023-11-17 | End: 2023-11-25 | Stop reason: SDUPTHER

## 2023-11-17 RX ORDER — BUSPIRONE HYDROCHLORIDE 10 MG/1
10 TABLET ORAL 2 TIMES DAILY
Status: DISCONTINUED | OUTPATIENT
Start: 2023-11-17 | End: 2023-12-07 | Stop reason: HOSPADM

## 2023-11-17 RX ORDER — BUPIVACAINE HYDROCHLORIDE 5 MG/ML
INJECTION, SOLUTION EPIDURAL; INTRACAUDAL PRN
Status: DISCONTINUED | OUTPATIENT
Start: 2023-11-17 | End: 2023-11-17 | Stop reason: ALTCHOICE

## 2023-11-17 RX ORDER — SODIUM CHLORIDE 9 MG/ML
INJECTION, SOLUTION INTRAVENOUS PRN
Status: DISCONTINUED | OUTPATIENT
Start: 2023-11-17 | End: 2023-11-17 | Stop reason: HOSPADM

## 2023-11-17 RX ORDER — ONDANSETRON 2 MG/ML
INJECTION INTRAMUSCULAR; INTRAVENOUS PRN
Status: DISCONTINUED | OUTPATIENT
Start: 2023-11-17 | End: 2023-11-17 | Stop reason: SDUPTHER

## 2023-11-17 RX ORDER — PROPOFOL 10 MG/ML
INJECTION, EMULSION INTRAVENOUS PRN
Status: DISCONTINUED | OUTPATIENT
Start: 2023-11-17 | End: 2023-11-17 | Stop reason: SDUPTHER

## 2023-11-17 RX ORDER — SODIUM CHLORIDE 0.9 % (FLUSH) 0.9 %
5-40 SYRINGE (ML) INJECTION PRN
Status: DISCONTINUED | OUTPATIENT
Start: 2023-11-17 | End: 2023-11-25 | Stop reason: SDUPTHER

## 2023-11-17 RX ORDER — FLUDROCORTISONE ACETATE 0.1 MG/1
0.2 TABLET ORAL DAILY
Status: DISCONTINUED | OUTPATIENT
Start: 2023-11-18 | End: 2023-12-07 | Stop reason: HOSPADM

## 2023-11-17 RX ORDER — VENLAFAXINE HYDROCHLORIDE 150 MG/1
150 CAPSULE, EXTENDED RELEASE ORAL NIGHTLY
Status: DISCONTINUED | OUTPATIENT
Start: 2023-11-17 | End: 2023-12-07 | Stop reason: HOSPADM

## 2023-11-17 RX ORDER — MIDODRINE HYDROCHLORIDE 10 MG/1
10 TABLET ORAL 2 TIMES DAILY
Status: DISCONTINUED | OUTPATIENT
Start: 2023-11-17 | End: 2023-12-07 | Stop reason: HOSPADM

## 2023-11-17 RX ORDER — ONDANSETRON 2 MG/ML
4 INJECTION INTRAMUSCULAR; INTRAVENOUS EVERY 6 HOURS PRN
Status: DISCONTINUED | OUTPATIENT
Start: 2023-11-17 | End: 2023-11-25 | Stop reason: SDUPTHER

## 2023-11-17 RX ORDER — SODIUM CHLORIDE, SODIUM LACTATE, POTASSIUM CHLORIDE, CALCIUM CHLORIDE 600; 310; 30; 20 MG/100ML; MG/100ML; MG/100ML; MG/100ML
INJECTION, SOLUTION INTRAVENOUS CONTINUOUS
Status: DISCONTINUED | OUTPATIENT
Start: 2023-11-17 | End: 2023-11-20

## 2023-11-17 RX ORDER — SODIUM CHLORIDE 0.9 % (FLUSH) 0.9 %
5-40 SYRINGE (ML) INJECTION PRN
Status: DISCONTINUED | OUTPATIENT
Start: 2023-11-17 | End: 2023-11-17 | Stop reason: HOSPADM

## 2023-11-17 RX ORDER — DEXAMETHASONE SODIUM PHOSPHATE 10 MG/ML
INJECTION, EMULSION INTRAMUSCULAR; INTRAVENOUS PRN
Status: DISCONTINUED | OUTPATIENT
Start: 2023-11-17 | End: 2023-11-17 | Stop reason: SDUPTHER

## 2023-11-17 RX ORDER — INDOCYANINE GREEN AND WATER 25 MG
KIT INJECTION PRN
Status: DISCONTINUED | OUTPATIENT
Start: 2023-11-17 | End: 2023-11-17 | Stop reason: SDUPTHER

## 2023-11-17 RX ORDER — PROPRANOLOL HYDROCHLORIDE 10 MG/1
10 TABLET ORAL 2 TIMES DAILY
Status: DISCONTINUED | OUTPATIENT
Start: 2023-11-17 | End: 2023-12-07 | Stop reason: HOSPADM

## 2023-11-17 RX ORDER — ROCURONIUM BROMIDE 10 MG/ML
INJECTION, SOLUTION INTRAVENOUS PRN
Status: DISCONTINUED | OUTPATIENT
Start: 2023-11-17 | End: 2023-11-17 | Stop reason: SDUPTHER

## 2023-11-17 RX ORDER — METOPROLOL SUCCINATE 25 MG/1
25 TABLET, EXTENDED RELEASE ORAL DAILY
Status: DISCONTINUED | OUTPATIENT
Start: 2023-11-17 | End: 2023-12-07 | Stop reason: HOSPADM

## 2023-11-17 RX ORDER — LANOLIN ALCOHOL/MO/W.PET/CERES
6 CREAM (GRAM) TOPICAL NIGHTLY PRN
Status: DISCONTINUED | OUTPATIENT
Start: 2023-11-17 | End: 2023-12-07 | Stop reason: HOSPADM

## 2023-11-17 RX ORDER — ACETAMINOPHEN 500 MG
1000 TABLET ORAL ONCE
Status: COMPLETED | OUTPATIENT
Start: 2023-11-17 | End: 2023-11-17

## 2023-11-17 RX ORDER — LIDOCAINE HCL/PF 100 MG/5ML
SYRINGE (ML) INJECTION PRN
Status: DISCONTINUED | OUTPATIENT
Start: 2023-11-17 | End: 2023-11-17 | Stop reason: SDUPTHER

## 2023-11-17 RX ORDER — SODIUM CHLORIDE 0.9 % (FLUSH) 0.9 %
5-40 SYRINGE (ML) INJECTION EVERY 12 HOURS SCHEDULED
Status: DISCONTINUED | OUTPATIENT
Start: 2023-11-17 | End: 2023-11-25 | Stop reason: SDUPTHER

## 2023-11-17 RX ORDER — SODIUM CHLORIDE 9 MG/ML
INJECTION, SOLUTION INTRAVENOUS PRN
Status: DISCONTINUED | OUTPATIENT
Start: 2023-11-17 | End: 2023-11-25 | Stop reason: SDUPTHER

## 2023-11-17 RX ORDER — IBUPROFEN 800 MG/1
800 TABLET ORAL ONCE
Status: COMPLETED | OUTPATIENT
Start: 2023-11-17 | End: 2023-11-17

## 2023-11-17 RX ADMIN — Medication 5 MG: at 11:18

## 2023-11-17 RX ADMIN — PROPRANOLOL HYDROCHLORIDE 10 MG: 10 TABLET ORAL at 19:29

## 2023-11-17 RX ADMIN — SODIUM CHLORIDE, PRESERVATIVE FREE 10 ML: 5 INJECTION INTRAVENOUS at 19:33

## 2023-11-17 RX ADMIN — FENTANYL CITRATE 50 MCG: 50 INJECTION INTRAMUSCULAR; INTRAVENOUS at 12:22

## 2023-11-17 RX ADMIN — MIDAZOLAM 2 MG: 1 INJECTION INTRAMUSCULAR; INTRAVENOUS at 09:22

## 2023-11-17 RX ADMIN — SODIUM CHLORIDE, POTASSIUM CHLORIDE, SODIUM LACTATE AND CALCIUM CHLORIDE: 600; 310; 30; 20 INJECTION, SOLUTION INTRAVENOUS at 14:04

## 2023-11-17 RX ADMIN — ROCURONIUM BROMIDE 20 MG: 10 INJECTION INTRAVENOUS at 10:37

## 2023-11-17 RX ADMIN — HYDROMORPHONE HYDROCHLORIDE 0.5 MG: 1 INJECTION, SOLUTION INTRAMUSCULAR; INTRAVENOUS; SUBCUTANEOUS at 20:38

## 2023-11-17 RX ADMIN — FAMOTIDINE 20 MG: 10 INJECTION, SOLUTION INTRAVENOUS at 19:27

## 2023-11-17 RX ADMIN — FENTANYL CITRATE 50 MCG: 50 INJECTION INTRAMUSCULAR; INTRAVENOUS at 12:27

## 2023-11-17 RX ADMIN — SODIUM CHLORIDE: 9 INJECTION, SOLUTION INTRAVENOUS at 08:05

## 2023-11-17 RX ADMIN — SODIUM CHLORIDE, PRESERVATIVE FREE 10 ML: 5 INJECTION INTRAVENOUS at 20:38

## 2023-11-17 RX ADMIN — SODIUM CHLORIDE: 9 INJECTION, SOLUTION INTRAVENOUS at 11:11

## 2023-11-17 RX ADMIN — NALOXEGOL OXALATE 25 MG: 25 TABLET, FILM COATED ORAL at 08:27

## 2023-11-17 RX ADMIN — Medication 140 MG: at 09:26

## 2023-11-17 RX ADMIN — HYDROMORPHONE HYDROCHLORIDE 0.5 MG: 1 INJECTION, SOLUTION INTRAMUSCULAR; INTRAVENOUS; SUBCUTANEOUS at 12:32

## 2023-11-17 RX ADMIN — Medication 3000 MG: at 11:28

## 2023-11-17 RX ADMIN — TRAZODONE HYDROCHLORIDE 150 MG: 50 TABLET ORAL at 19:27

## 2023-11-17 RX ADMIN — ONDANSETRON 4 MG: 2 INJECTION INTRAMUSCULAR; INTRAVENOUS at 09:29

## 2023-11-17 RX ADMIN — BUSPIRONE HYDROCHLORIDE 10 MG: 10 TABLET ORAL at 19:29

## 2023-11-17 RX ADMIN — PROPOFOL 200 MG: 10 INJECTION, EMULSION INTRAVENOUS at 09:26

## 2023-11-17 RX ADMIN — KETOROLAC TROMETHAMINE 30 MG: 30 INJECTION, SOLUTION INTRAMUSCULAR; INTRAVENOUS at 19:27

## 2023-11-17 RX ADMIN — FENTANYL CITRATE 50 MCG: 50 INJECTION INTRAMUSCULAR; INTRAVENOUS at 12:57

## 2023-11-17 RX ADMIN — HYDROMORPHONE HYDROCHLORIDE 0.5 MG: 1 INJECTION, SOLUTION INTRAMUSCULAR; INTRAVENOUS; SUBCUTANEOUS at 12:42

## 2023-11-17 RX ADMIN — FENTANYL CITRATE 50 MCG: 50 INJECTION INTRAMUSCULAR; INTRAVENOUS at 12:52

## 2023-11-17 RX ADMIN — HYDROMORPHONE HYDROCHLORIDE 1 MG: 2 INJECTION INTRAMUSCULAR; INTRAVENOUS; SUBCUTANEOUS at 10:38

## 2023-11-17 RX ADMIN — HYDROMORPHONE HYDROCHLORIDE 0.5 MG: 1 INJECTION, SOLUTION INTRAMUSCULAR; INTRAVENOUS; SUBCUTANEOUS at 12:47

## 2023-11-17 RX ADMIN — MIDODRINE HYDROCHLORIDE 10 MG: 10 TABLET ORAL at 19:29

## 2023-11-17 RX ADMIN — HYDROMORPHONE HYDROCHLORIDE 1 MG: 2 INJECTION INTRAMUSCULAR; INTRAVENOUS; SUBCUTANEOUS at 09:56

## 2023-11-17 RX ADMIN — KETOROLAC TROMETHAMINE 30 MG: 30 INJECTION, SOLUTION INTRAMUSCULAR; INTRAVENOUS at 13:59

## 2023-11-17 RX ADMIN — Medication 60 MG: at 09:26

## 2023-11-17 RX ADMIN — SUGAMMADEX 200 MG: 100 INJECTION, SOLUTION INTRAVENOUS at 12:11

## 2023-11-17 RX ADMIN — DEXAMETHASONE SODIUM PHOSPHATE 8 MG: 4 INJECTION, SOLUTION INTRA-ARTICULAR; INTRALESIONAL; INTRAMUSCULAR; INTRAVENOUS; SOFT TISSUE at 08:28

## 2023-11-17 RX ADMIN — ROCURONIUM BROMIDE 20 MG: 10 INJECTION INTRAVENOUS at 11:05

## 2023-11-17 RX ADMIN — OXYCODONE HYDROCHLORIDE 10 MG: 5 TABLET ORAL at 21:54

## 2023-11-17 RX ADMIN — SODIUM CHLORIDE, PRESERVATIVE FREE 10 ML: 5 INJECTION INTRAVENOUS at 23:50

## 2023-11-17 RX ADMIN — FENTANYL CITRATE 100 MCG: 50 INJECTION, SOLUTION INTRAMUSCULAR; INTRAVENOUS at 09:26

## 2023-11-17 RX ADMIN — IBUPROFEN 800 MG: 800 TABLET, FILM COATED ORAL at 08:28

## 2023-11-17 RX ADMIN — SODIUM CHLORIDE 1000 MG: 900 INJECTION INTRAVENOUS at 17:59

## 2023-11-17 RX ADMIN — RISPERIDONE 0.5 MG: 0.25 TABLET, FILM COATED ORAL at 19:29

## 2023-11-17 RX ADMIN — HYDROMORPHONE HYDROCHLORIDE 0.5 MG: 1 INJECTION, SOLUTION INTRAMUSCULAR; INTRAVENOUS; SUBCUTANEOUS at 12:37

## 2023-11-17 RX ADMIN — HYDROMORPHONE HYDROCHLORIDE 0.5 MG: 1 INJECTION, SOLUTION INTRAMUSCULAR; INTRAVENOUS; SUBCUTANEOUS at 23:50

## 2023-11-17 RX ADMIN — ACETAMINOPHEN 1000 MG: 500 TABLET ORAL at 08:28

## 2023-11-17 RX ADMIN — Medication 3000 MG: at 09:29

## 2023-11-17 RX ADMIN — ROCURONIUM BROMIDE 50 MG: 10 INJECTION INTRAVENOUS at 09:41

## 2023-11-17 RX ADMIN — OXYCODONE HYDROCHLORIDE 10 MG: 5 TABLET ORAL at 12:59

## 2023-11-17 RX ADMIN — SODIUM CHLORIDE, POTASSIUM CHLORIDE, SODIUM LACTATE AND CALCIUM CHLORIDE: 600; 310; 30; 20 INJECTION, SOLUTION INTRAVENOUS at 22:48

## 2023-11-17 RX ADMIN — HYDROMORPHONE HYDROCHLORIDE 0.5 MG: 1 INJECTION, SOLUTION INTRAMUSCULAR; INTRAVENOUS; SUBCUTANEOUS at 13:59

## 2023-11-17 RX ADMIN — HYDROMORPHONE HYDROCHLORIDE 0.5 MG: 1 INJECTION, SOLUTION INTRAMUSCULAR; INTRAVENOUS; SUBCUTANEOUS at 16:54

## 2023-11-17 RX ADMIN — METOPROLOL SUCCINATE 25 MG: 25 TABLET, EXTENDED RELEASE ORAL at 18:01

## 2023-11-17 RX ADMIN — DEXAMETHASONE SODIUM PHOSPHATE 8 MG: 10 INJECTION, EMULSION INTRAMUSCULAR; INTRAVENOUS at 09:29

## 2023-11-17 RX ADMIN — VENLAFAXINE HYDROCHLORIDE 150 MG: 150 CAPSULE, EXTENDED RELEASE ORAL at 19:29

## 2023-11-17 ASSESSMENT — PAIN DESCRIPTION - LOCATION
LOCATION: ABDOMEN

## 2023-11-17 ASSESSMENT — PAIN SCALES - GENERAL
PAINLEVEL_OUTOF10: 7
PAINLEVEL_OUTOF10: 7
PAINLEVEL_OUTOF10: 10
PAINLEVEL_OUTOF10: 7
PAINLEVEL_OUTOF10: 9
PAINLEVEL_OUTOF10: 7
PAINLEVEL_OUTOF10: 10
PAINLEVEL_OUTOF10: 10
PAINLEVEL_OUTOF10: 2
PAINLEVEL_OUTOF10: 7
PAINLEVEL_OUTOF10: 7
PAINLEVEL_OUTOF10: 9
PAINLEVEL_OUTOF10: 7
PAINLEVEL_OUTOF10: 7

## 2023-11-17 ASSESSMENT — PAIN DESCRIPTION - FREQUENCY
FREQUENCY: CONTINUOUS

## 2023-11-17 ASSESSMENT — PAIN DESCRIPTION - ORIENTATION
ORIENTATION: MID
ORIENTATION: RIGHT;MID
ORIENTATION: MID

## 2023-11-17 ASSESSMENT — PAIN - FUNCTIONAL ASSESSMENT
PAIN_FUNCTIONAL_ASSESSMENT: PREVENTS OR INTERFERES SOME ACTIVE ACTIVITIES AND ADLS
PAIN_FUNCTIONAL_ASSESSMENT: PREVENTS OR INTERFERES SOME ACTIVE ACTIVITIES AND ADLS
PAIN_FUNCTIONAL_ASSESSMENT: 0-10

## 2023-11-17 ASSESSMENT — PAIN DESCRIPTION - ONSET
ONSET: ON-GOING

## 2023-11-17 ASSESSMENT — PAIN DESCRIPTION - DESCRIPTORS
DESCRIPTORS: ACHING;SHOOTING
DESCRIPTORS: ACHING
DESCRIPTORS: STABBING
DESCRIPTORS: ACHING
DESCRIPTORS: DISCOMFORT
DESCRIPTORS: DISCOMFORT

## 2023-11-17 ASSESSMENT — PAIN DESCRIPTION - PAIN TYPE
TYPE: SURGICAL PAIN

## 2023-11-17 ASSESSMENT — LIFESTYLE VARIABLES: SMOKING_STATUS: 1

## 2023-11-17 NOTE — PROGRESS NOTES
Patient oriented to Same Day department and admitted to Same Day Surgery room 16. Patient verbalized approval for first name, last initial with physician name on unit whiteboard. Plan of care reviewed with patient. Patient room whiteboard filled out and discussed with patient and responsible adult. Patient and responsible adult offered Same Day Welcome Packet to review. Call light in reach. Bed in lowest position, locked, with one bed rail up. SCDs and warming blanket in place. Appropriate arm bands on patient. Bathroom offered. All questions and concerns of patient addressed. Meds to Beds:   Patient informed of [facility]'s Meds to [facility] program during admission.  Patient is agreeable to program.   Contact information for the pharmacy and the Meds to Beds program:   Name: Amanda Baxter   Relationship to patient:patient   Phone number: in house

## 2023-11-17 NOTE — PROGRESS NOTES
1218 pt arrived to pacu, awakens to . Respirations unlabored on L . Sites CDI x. VSS. Pt states pain 7/10 at this time  1222 medicated with 50 mcg fentanyl  1227 no change in pain status, medicated with 50 mcg fentanyl  1232 no change in pain status, medicated with 0.5 mg dilaudid  1237 no change in pain status, medicated with 0.5 mg dilaudid  1242 no change in pain status, medicated with 0.5 mg dilaudid  1247 no change in pain status, medicated with 0.5 mg dilaudid  1252 no change in pain status, medicated with 50 mcg fentanyl  1257 no change in pain status, medicated with 50 mcg fentanyl  1259 medicated with 10 mg Roxicodone  1310 pt states pain still 7/10, Dr Manjinder Carter notified. No new orders  1315 pt awake in bed, denies needs. VSS  1329 pt meets criteria for discharge from pacu at this time.  Pt transported to [facility] in stable condition   1333 pt has bed ready, report called to [address]

## 2023-11-17 NOTE — OP NOTE
[address]   Operative Report    PATIENT NAME: Arlee Closs  MEDICAL RECORD NO. [id number]  SURGEON: Horacio Morse MD MD FACS  Primary Care Physician: Gris Roberts  Date: 11/17/2023, 12:13 PM     PROCEDURE PERFORMED: Robotic sigmoid colectomy with Firefly and on table rigid sigmoidoscopy  PREOPERATIVE DIAGNOSIS:   Active Hospital Problems    Diagnosis Date Noted    Diverticulitis of large intestine with perforation without bleeding [K57.20] 10/25/2023    Acute diverticulitis [K57.92] 07/07/2023    Postural orthostatic tachycardia syndrome (POTS) [G90. A] 07/07/2023    Post traumatic stress disorder (PTSD) [F43.10] 07/07/2023      POSTOPERATIVE DIAGNOSIS: Same, path pending  SURGEON:  Horacio Morse MD MD FACS  ANESTHESIA:  General endotracheal anesthesia and local  ANESTHESIA:  30 OF 0.5% Marcaine  ESTIMATED BLOOD LOSS:  30  ml  SPECIMEN: sigmoid colon   COMPLICATIONS:  None; patient tolerated the procedure well. DRAINS: (1) 23 Jean Claude drain(s) in the pelvis  DISPOSITION: PACU - hemodynamically stable. CONDITION: stable         PROCEDURE: The patient was taken to the operative suite and was placed on the table in a supine position. General endotracheal anesthesia was administered. A Ruvalcaba catheter was placed and the abdomen was prepped with Chloroprep and draped in a sterile fashion. NARRATIVE: The patient was brought to the operating room and placed supine   and general anesthesia was administered. Signed consent on the chart. Preoperative antibiotics were given. After four hours an additional dose of   Mefoxin was also reposed. After adequate general endotracheal anesthesia the   patient was placed in Eyelation with appropriate padding. male arms were   down at male side with appropriate padding. Ruvalcaba catheter was placed as   straight drain. Orogastric tube was placed.  Landmarks were drawn out on the   abdomen including the costal margins, the xiphoid, the iliac crest,

## 2023-11-17 NOTE — ANESTHESIA POSTPROCEDURE EVALUATION
Department of Anesthesiology  Postprocedure Note    Patient: Elliot Hancock  MRN: [id number]  YOB: 1995  Date of evaluation: 11/17/2023      Procedure Summary     Date: 11/17/23 Room / Location: [address] [place] [facility]    Anesthesia Start: 0922 Anesthesia Stop: 5115    Procedure: Robotic Sigmoid Colectomy (Abdomen) Diagnosis:       Diverticulitis      (Diverticulitis [K57.92])    Surgeons: Jillian Goldmann, MD Responsible Provider: Nikkie Pastrana DO    Anesthesia Type: general ASA Status: 2          Anesthesia Type: No value filed.     Connor Phase I: Connor Score: 9    Connor Phase II:        Anesthesia Post Evaluation    Patient location during evaluation: PACU  Patient participation: complete - patient participated  Level of consciousness: awake  Airway patency: patent  Nausea & Vomiting: no nausea  Complications: no  Cardiovascular status: hemodynamically stable  Respiratory status: acceptable  Hydration status: stable  Pain management: adequate

## 2023-11-17 NOTE — H&P
H and P for [address]  History and Physical Update    Pt Name: Chauncey Avila  MRN: [id number]  YOB: 1995  Date of evaluation: 11/17/2023    [x] I have examined the patient and reviewed the H&P/Consult and there are no changes to the patient or plans.     [x] I have examined the patient and reviewed the H&P/Consult and have noted the following changes:    He didn't take his meds for his POTS as directed and hasnt taken them in 2 days, anesthesia notified and felt OK to proceed      Timothy Sarkar MD  Electronically signed 11/17/2023 at 9:19 AM

## 2023-11-17 NOTE — PROGRESS NOTES
Pt admitted to  5 via via cart/stretcher from  surgery . Complaints: colectomy. IV lactated Ringer's infusing into the hand left, condition patent and no redness at a rate of 125 mls/ hour with about 600 mls in the bag still. IV site free of s/s of infection or infiltration. Vital signs obtained. Assessment and data collection initiated. Two nurse skin assessment performed by Ashley Muniz and Magali Reyes. Oriented to room. Policies and procedures for  explained. All questions answered with no further questions at this time. Fall prevention and safety brochure discussed with patient. Bed alarm on. Call light in reach. Oriented to room. Lucas Appiah, RN, RN 11/17/2023 6:36 PM     Explained patients right to have family, representative or physician notified of their admission. Patient has Declined for physician to be notified. Patient has Declined for family/representative to be notified.

## 2023-11-18 LAB
BASOPHILS ABSOLUTE: 0 THOU/MM3 (ref 0–0.1)
BASOPHILS NFR BLD AUTO: 0.1 %
CRP SERPL-MCNC: 1.57 MG/DL (ref 0–1)
DEPRECATED RDW RBC AUTO: 39.9 FL (ref 35–45)
EOSINOPHIL NFR BLD AUTO: 0 %
EOSINOPHILS ABSOLUTE: 0 THOU/MM3 (ref 0–0.4)
ERYTHROCYTE [DISTWIDTH] IN BLOOD BY AUTOMATED COUNT: 12.4 % (ref 11.5–14.5)
HCT VFR BLD AUTO: 34.4 % (ref 42–52)
HGB BLD-MCNC: 11.6 GM/DL (ref 14–18)
IMM GRANULOCYTES # BLD AUTO: 0.04 THOU/MM3 (ref 0–0.07)
IMM GRANULOCYTES NFR BLD AUTO: 0.3 %
LYMPHOCYTES ABSOLUTE: 1.1 THOU/MM3 (ref 1–4.8)
LYMPHOCYTES NFR BLD AUTO: 9.4 %
MCH RBC QN AUTO: 29.8 PG (ref 26–33)
MCHC RBC AUTO-ENTMCNC: 33.7 GM/DL (ref 32.2–35.5)
MCV RBC AUTO: 88.4 FL (ref 80–94)
MONOCYTES ABSOLUTE: 1.2 THOU/MM3 (ref 0.4–1.3)
MONOCYTES NFR BLD AUTO: 10.7 %
NEUTROPHILS NFR BLD AUTO: 79.5 %
NRBC BLD AUTO-RTO: 0 /100 WBC
PLATELET # BLD AUTO: 209 THOU/MM3 (ref 130–400)
PMV BLD AUTO: 11.7 FL (ref 9.4–12.4)
RBC # BLD AUTO: 3.89 MILL/MM3 (ref 4.7–6.1)
SEGMENTED NEUTROPHILS ABSOLUTE COUNT: 9.2 THOU/MM3 (ref 1.8–7.7)
WBC # BLD AUTO: 11.6 THOU/MM3 (ref 4.8–10.8)

## 2023-11-18 PROCEDURE — 85025 COMPLETE CBC W/AUTO DIFF WBC: CPT

## 2023-11-18 PROCEDURE — 36415 COLL VENOUS BLD VENIPUNCTURE: CPT

## 2023-11-18 PROCEDURE — A4216 STERILE WATER/SALINE, 10 ML: HCPCS | Performed by: SURGERY

## 2023-11-18 PROCEDURE — 2500000003 HC RX 250 WO HCPCS: Performed by: SURGERY

## 2023-11-18 PROCEDURE — 86140 C-REACTIVE PROTEIN: CPT

## 2023-11-18 PROCEDURE — 1200000000 HC SEMI PRIVATE

## 2023-11-18 PROCEDURE — 6370000000 HC RX 637 (ALT 250 FOR IP): Performed by: PHYSICIAN ASSISTANT

## 2023-11-18 PROCEDURE — 2580000003 HC RX 258: Performed by: SURGERY

## 2023-11-18 PROCEDURE — 6360000002 HC RX W HCPCS: Performed by: SURGERY

## 2023-11-18 PROCEDURE — 6370000000 HC RX 637 (ALT 250 FOR IP): Performed by: SURGERY

## 2023-11-18 RX ORDER — CALCIUM CARBONATE 500 MG/1
500 TABLET, CHEWABLE ORAL 3 TIMES DAILY PRN
Status: DISCONTINUED | OUTPATIENT
Start: 2023-11-17 | End: 2023-12-07 | Stop reason: HOSPADM

## 2023-11-18 RX ADMIN — SODIUM CHLORIDE 1000 MG: 900 INJECTION INTRAVENOUS at 00:05

## 2023-11-18 RX ADMIN — ANTACID TABLETS 500 MG: 500 TABLET, CHEWABLE ORAL at 08:25

## 2023-11-18 RX ADMIN — KETOROLAC TROMETHAMINE 30 MG: 30 INJECTION, SOLUTION INTRAMUSCULAR; INTRAVENOUS at 19:52

## 2023-11-18 RX ADMIN — OXYCODONE HYDROCHLORIDE 10 MG: 5 TABLET ORAL at 21:36

## 2023-11-18 RX ADMIN — METOPROLOL SUCCINATE 25 MG: 25 TABLET, EXTENDED RELEASE ORAL at 08:28

## 2023-11-18 RX ADMIN — SODIUM CHLORIDE 1000 MG: 900 INJECTION INTRAVENOUS at 06:03

## 2023-11-18 RX ADMIN — HYDROMORPHONE HYDROCHLORIDE 0.5 MG: 1 INJECTION, SOLUTION INTRAMUSCULAR; INTRAVENOUS; SUBCUTANEOUS at 04:30

## 2023-11-18 RX ADMIN — NALOXEGOL OXALATE 25 MG: 25 TABLET, FILM COATED ORAL at 08:27

## 2023-11-18 RX ADMIN — SODIUM CHLORIDE, PRESERVATIVE FREE 10 ML: 5 INJECTION INTRAVENOUS at 19:54

## 2023-11-18 RX ADMIN — HYDROMORPHONE HYDROCHLORIDE 0.5 MG: 1 INJECTION, SOLUTION INTRAMUSCULAR; INTRAVENOUS; SUBCUTANEOUS at 16:15

## 2023-11-18 RX ADMIN — OXYCODONE HYDROCHLORIDE 10 MG: 5 TABLET ORAL at 05:30

## 2023-11-18 RX ADMIN — BUSPIRONE HYDROCHLORIDE 10 MG: 10 TABLET ORAL at 19:52

## 2023-11-18 RX ADMIN — PROPRANOLOL HYDROCHLORIDE 10 MG: 10 TABLET ORAL at 19:52

## 2023-11-18 RX ADMIN — HYDROMORPHONE HYDROCHLORIDE 0.5 MG: 1 INJECTION, SOLUTION INTRAMUSCULAR; INTRAVENOUS; SUBCUTANEOUS at 12:53

## 2023-11-18 RX ADMIN — RISPERIDONE 0.5 MG: 0.25 TABLET, FILM COATED ORAL at 19:52

## 2023-11-18 RX ADMIN — RISPERIDONE 0.5 MG: 0.25 TABLET, FILM COATED ORAL at 08:27

## 2023-11-18 RX ADMIN — KETOROLAC TROMETHAMINE 30 MG: 30 INJECTION, SOLUTION INTRAMUSCULAR; INTRAVENOUS at 14:36

## 2023-11-18 RX ADMIN — PROPRANOLOL HYDROCHLORIDE 10 MG: 10 TABLET ORAL at 08:26

## 2023-11-18 RX ADMIN — FAMOTIDINE 20 MG: 10 INJECTION, SOLUTION INTRAVENOUS at 19:52

## 2023-11-18 RX ADMIN — SODIUM CHLORIDE, PRESERVATIVE FREE 10 ML: 5 INJECTION INTRAVENOUS at 08:28

## 2023-11-18 RX ADMIN — MIDODRINE HYDROCHLORIDE 10 MG: 10 TABLET ORAL at 08:27

## 2023-11-18 RX ADMIN — TRAZODONE HYDROCHLORIDE 150 MG: 50 TABLET ORAL at 19:52

## 2023-11-18 RX ADMIN — FLUDROCORTISONE ACETATE 0.2 MG: 0.1 TABLET ORAL at 08:28

## 2023-11-18 RX ADMIN — SODIUM CHLORIDE, POTASSIUM CHLORIDE, SODIUM LACTATE AND CALCIUM CHLORIDE: 600; 310; 30; 20 INJECTION, SOLUTION INTRAVENOUS at 08:22

## 2023-11-18 RX ADMIN — HYDROMORPHONE HYDROCHLORIDE 0.5 MG: 1 INJECTION, SOLUTION INTRAMUSCULAR; INTRAVENOUS; SUBCUTANEOUS at 22:42

## 2023-11-18 RX ADMIN — OXYCODONE HYDROCHLORIDE 10 MG: 5 TABLET ORAL at 14:36

## 2023-11-18 RX ADMIN — KETOROLAC TROMETHAMINE 30 MG: 30 INJECTION, SOLUTION INTRAMUSCULAR; INTRAVENOUS at 08:30

## 2023-11-18 RX ADMIN — HYDROMORPHONE HYDROCHLORIDE 0.5 MG: 1 INJECTION, SOLUTION INTRAMUSCULAR; INTRAVENOUS; SUBCUTANEOUS at 08:26

## 2023-11-18 RX ADMIN — FAMOTIDINE 20 MG: 10 INJECTION, SOLUTION INTRAVENOUS at 08:26

## 2023-11-18 RX ADMIN — VENLAFAXINE HYDROCHLORIDE 150 MG: 150 CAPSULE, EXTENDED RELEASE ORAL at 19:52

## 2023-11-18 RX ADMIN — KETOROLAC TROMETHAMINE 30 MG: 30 INJECTION, SOLUTION INTRAMUSCULAR; INTRAVENOUS at 03:31

## 2023-11-18 RX ADMIN — MIDODRINE HYDROCHLORIDE 10 MG: 10 TABLET ORAL at 19:52

## 2023-11-18 RX ADMIN — BUSPIRONE HYDROCHLORIDE 10 MG: 10 TABLET ORAL at 08:27

## 2023-11-18 ASSESSMENT — PAIN DESCRIPTION - DESCRIPTORS
DESCRIPTORS: ACHING;SHOOTING
DESCRIPTORS: ACHING
DESCRIPTORS: ACHING;THROBBING
DESCRIPTORS: ACHING
DESCRIPTORS: ACHING
DESCRIPTORS: ACHING;THROBBING
DESCRIPTORS: ACHING;CRAMPING
DESCRIPTORS: ACHING
DESCRIPTORS: ACHING;THROBBING
DESCRIPTORS: ACHING;CRAMPING
DESCRIPTORS: ACHING;SHOOTING

## 2023-11-18 ASSESSMENT — PAIN DESCRIPTION - ORIENTATION
ORIENTATION: MID
ORIENTATION: RIGHT;MID
ORIENTATION: MID
ORIENTATION: RIGHT;MID
ORIENTATION: MID
ORIENTATION: RIGHT;MID
ORIENTATION: MID

## 2023-11-18 ASSESSMENT — PAIN DESCRIPTION - LOCATION
LOCATION: ABDOMEN

## 2023-11-18 ASSESSMENT — PAIN SCALES - GENERAL
PAINLEVEL_OUTOF10: 8
PAINLEVEL_OUTOF10: 7
PAINLEVEL_OUTOF10: 8
PAINLEVEL_OUTOF10: 7
PAINLEVEL_OUTOF10: 8
PAINLEVEL_OUTOF10: 8
PAINLEVEL_OUTOF10: 7
PAINLEVEL_OUTOF10: 7

## 2023-11-18 ASSESSMENT — PAIN DESCRIPTION - PAIN TYPE: TYPE: SURGICAL PAIN

## 2023-11-18 ASSESSMENT — PAIN - FUNCTIONAL ASSESSMENT
PAIN_FUNCTIONAL_ASSESSMENT: ACTIVITIES ARE NOT PREVENTED

## 2023-11-18 ASSESSMENT — PAIN DESCRIPTION - ONSET: ONSET: ON-GOING

## 2023-11-18 ASSESSMENT — PAIN DESCRIPTION - FREQUENCY: FREQUENCY: CONTINUOUS

## 2023-11-18 NOTE — PLAN OF CARE
Problem: Discharge Planning  Goal: Discharge to home or other facility with appropriate resources  11/18/2023 1503 by Vj Muñoz RN  Outcome: Progressing  Flowsheets (Taken 11/18/2023 1503)  Discharge to home or other facility with appropriate resources:   Identify barriers to discharge with patient and caregiver   Arrange for needed discharge resources and transportation as appropriate  Note: Home at discharge. Unknown discharge date.       Problem: Pain  Goal: Verbalizes/displays adequate comfort level or baseline comfort level  11/18/2023 1503 by Vj Muñoz RN  Outcome: Progressing  Flowsheets (Taken 11/18/2023 1503)  Verbalizes/displays adequate comfort level or baseline comfort level:   Encourage patient to monitor pain and request assistance   Assess pain using appropriate pain scale   Administer analgesics based on type and severity of pain and evaluate response   Implement non-pharmacological measures as appropriate and evaluate response     Problem: ABCDS Injury Assessment  Goal: Absence of physical injury  11/18/2023 1503 by Vj Muñoz RN  Outcome: Progressing  Flowsheets (Taken 11/18/2023 1503)  Absence of Physical Injury: Implement safety measures based on patient assessment     Problem: Safety - Adult  Goal: Free from fall injury  11/18/2023 1503 by Vj Muñoz RN  Outcome: Progressing  Flowsheets (Taken 11/18/2023 1503)  Free From Fall Injury: Instruct family/caregiver on patient safety     Problem: Skin/Tissue Integrity - Adult  Goal: Skin integrity remains intact  11/18/2023 1503 by Vj Muñoz RN  Outcome: Progressing  Flowsheets (Taken 11/18/2023 1503)  Skin Integrity Remains Intact: Monitor for areas of redness and/or skin breakdown     Problem: Skin/Tissue Integrity - Adult  Goal: Incisions, wounds, or drain sites healing without S/S of infection  11/18/2023 1503 by Vj Muñoz RN  Outcome: Progressing  Flowsheets (Taken 11/18/2023 1503)  Incisions,

## 2023-11-18 NOTE — PLAN OF CARE
Problem: Discharge Planning  Goal: Discharge to home or other facility with appropriate resources  11/18/2023 0359 by Loren Calderón RN  Outcome: Progressing  Flowsheets (Taken 11/18/2023 0359)  Discharge to home or other facility with appropriate resources:   Identify barriers to discharge with patient and caregiver   Arrange for needed discharge resources and transportation as appropriate   Identify discharge learning needs (meds, wound care, etc)  11/18/2023 0359 by Loren Calderón RN  Outcome: Progressing  Flowsheets (Taken 11/18/2023 0359)  Discharge to home or other facility with appropriate resources:   Identify barriers to discharge with patient and caregiver   Arrange for needed discharge resources and transportation as appropriate   Identify discharge learning needs (meds, wound care, etc)     Problem: Pain  Goal: Verbalizes/displays adequate comfort level or baseline comfort level  11/18/2023 0359 by Loren Calderón RN  Outcome: Progressing  Flowsheets (Taken 11/18/2023 0359)  Verbalizes/displays adequate comfort level or baseline comfort level:   Encourage patient to monitor pain and request assistance   Assess pain using appropriate pain scale   Administer analgesics based on type and severity of pain and evaluate response   Implement non-pharmacological measures as appropriate and evaluate response   Notify Licensed Independent Practitioner if interventions unsuccessful or patient reports new pain  11/18/2023 0359 by Loren Calderón RN  Outcome: Progressing  Flowsheets (Taken 11/18/2023 0359)  Verbalizes/displays adequate comfort level or baseline comfort level:   Encourage patient to monitor pain and request assistance   Assess pain using appropriate pain scale   Administer analgesics based on type and severity of pain and evaluate response   Implement non-pharmacological measures as appropriate and evaluate response   Notify Licensed Independent Practitioner if interventions unsuccessful or patient reports new pain     Problem: ABCDS Injury Assessment  Goal: Absence of physical injury  11/18/2023 0359 by Trevon Cleary RN  Outcome: Progressing  Flowsheets (Taken 11/18/2023 0359)  Absence of Physical Injury: Implement safety measures based on patient assessment  11/18/2023 0359 by Trevon Cleary RN  Outcome: Progressing  Flowsheets (Taken 11/18/2023 0359)  Absence of Physical Injury: Implement safety measures based on patient assessment     Problem: Safety - Adult  Goal: Free from fall injury  11/18/2023 0359 by Trevon Cleary RN  Outcome: Progressing  Flowsheets (Taken 11/18/2023 0359)  Free From Fall Injury:   Instruct family/caregiver on patient safety   Based on caregiver fall risk screen, instruct family/caregiver to ask for assistance with transferring infant if caregiver noted to have fall risk factors  11/18/2023 0359 by Trevon Cleary RN  Outcome: Progressing  [address] (Taken 11/18/2023 0359)  Free From Fall Injury:   Instruct family/caregiver on patient safety   Based on caregiver fall risk screen, instruct family/caregiver to ask for assistance with transferring infant if caregiver noted to have fall risk factors     Problem: Skin/Tissue Integrity - Adult  Goal: Skin integrity remains intact  11/18/2023 0359 by Trevon Cleary RN  Outcome: Progressing  Flowsheets (Taken 11/18/2023 0359)  Skin Integrity Remains Intact: Monitor for areas of redness and/or skin breakdown  11/18/2023 0359 by Trevon Cleary RN  Outcome: Progressing  Flowsheets (Taken 11/18/2023 0359)  Skin Integrity Remains Intact: Monitor for areas of redness and/or skin breakdown  Goal: Incisions, wounds, or drain sites healing without S/S of infection  11/18/2023 0359 by Trevon Cleary RN  Outcome: Progressing  Flowsheets (Taken 11/18/2023 0359)  Incisions, Wounds, or Drain Sites Healing Without Sign and Symptoms of Infection: ADMISSION and DAILY: Assess and document risk factors for

## 2023-11-19 LAB
BASOPHILS ABSOLUTE: 0 THOU/MM3 (ref 0–0.1)
BASOPHILS NFR BLD AUTO: 0.2 %
CRP SERPL-MCNC: 2.03 MG/DL (ref 0–1)
DEPRECATED RDW RBC AUTO: 40 FL (ref 35–45)
EOSINOPHIL NFR BLD AUTO: 0.9 %
EOSINOPHILS ABSOLUTE: 0.1 THOU/MM3 (ref 0–0.4)
ERYTHROCYTE [DISTWIDTH] IN BLOOD BY AUTOMATED COUNT: 12.4 % (ref 11.5–14.5)
HCT VFR BLD AUTO: 31.2 % (ref 42–52)
HGB BLD-MCNC: 10.3 GM/DL (ref 14–18)
IMM GRANULOCYTES # BLD AUTO: 0.02 THOU/MM3 (ref 0–0.07)
IMM GRANULOCYTES NFR BLD AUTO: 0.2 %
LYMPHOCYTES ABSOLUTE: 2.7 THOU/MM3 (ref 1–4.8)
LYMPHOCYTES NFR BLD AUTO: 33.1 %
MCH RBC QN AUTO: 29.6 PG (ref 26–33)
MCHC RBC AUTO-ENTMCNC: 33 GM/DL (ref 32.2–35.5)
MCV RBC AUTO: 89.7 FL (ref 80–94)
MONOCYTES ABSOLUTE: 0.7 THOU/MM3 (ref 0.4–1.3)
MONOCYTES NFR BLD AUTO: 8.8 %
NEUTROPHILS NFR BLD AUTO: 56.8 %
NRBC BLD AUTO-RTO: 0 /100 WBC
PLATELET # BLD AUTO: 167 THOU/MM3 (ref 130–400)
PMV BLD AUTO: 12.1 FL (ref 9.4–12.4)
RBC # BLD AUTO: 3.48 MILL/MM3 (ref 4.7–6.1)
SEGMENTED NEUTROPHILS ABSOLUTE COUNT: 4.7 THOU/MM3 (ref 1.8–7.7)
WBC # BLD AUTO: 8.2 THOU/MM3 (ref 4.8–10.8)

## 2023-11-19 PROCEDURE — 2500000003 HC RX 250 WO HCPCS: Performed by: SURGERY

## 2023-11-19 PROCEDURE — 36415 COLL VENOUS BLD VENIPUNCTURE: CPT

## 2023-11-19 PROCEDURE — 6360000002 HC RX W HCPCS: Performed by: SURGERY

## 2023-11-19 PROCEDURE — A4216 STERILE WATER/SALINE, 10 ML: HCPCS | Performed by: SURGERY

## 2023-11-19 PROCEDURE — 6370000000 HC RX 637 (ALT 250 FOR IP): Performed by: SURGERY

## 2023-11-19 PROCEDURE — 1200000000 HC SEMI PRIVATE

## 2023-11-19 PROCEDURE — 85025 COMPLETE CBC W/AUTO DIFF WBC: CPT

## 2023-11-19 PROCEDURE — 99024 POSTOP FOLLOW-UP VISIT: CPT | Performed by: SURGERY

## 2023-11-19 PROCEDURE — 86140 C-REACTIVE PROTEIN: CPT

## 2023-11-19 PROCEDURE — 2580000003 HC RX 258: Performed by: SURGERY

## 2023-11-19 RX ADMIN — KETOROLAC TROMETHAMINE 30 MG: 30 INJECTION, SOLUTION INTRAMUSCULAR; INTRAVENOUS at 09:14

## 2023-11-19 RX ADMIN — SODIUM CHLORIDE, POTASSIUM CHLORIDE, SODIUM LACTATE AND CALCIUM CHLORIDE: 600; 310; 30; 20 INJECTION, SOLUTION INTRAVENOUS at 08:10

## 2023-11-19 RX ADMIN — KETOROLAC TROMETHAMINE 30 MG: 30 INJECTION, SOLUTION INTRAMUSCULAR; INTRAVENOUS at 20:31

## 2023-11-19 RX ADMIN — FAMOTIDINE 20 MG: 10 INJECTION, SOLUTION INTRAVENOUS at 20:31

## 2023-11-19 RX ADMIN — OXYCODONE HYDROCHLORIDE 10 MG: 5 TABLET ORAL at 18:01

## 2023-11-19 RX ADMIN — BUSPIRONE HYDROCHLORIDE 10 MG: 10 TABLET ORAL at 20:30

## 2023-11-19 RX ADMIN — SODIUM CHLORIDE, POTASSIUM CHLORIDE, SODIUM LACTATE AND CALCIUM CHLORIDE: 600; 310; 30; 20 INJECTION, SOLUTION INTRAVENOUS at 15:44

## 2023-11-19 RX ADMIN — HYDROMORPHONE HYDROCHLORIDE 0.5 MG: 1 INJECTION, SOLUTION INTRAMUSCULAR; INTRAVENOUS; SUBCUTANEOUS at 15:33

## 2023-11-19 RX ADMIN — OXYCODONE HYDROCHLORIDE 10 MG: 5 TABLET ORAL at 06:50

## 2023-11-19 RX ADMIN — RISPERIDONE 0.5 MG: 0.25 TABLET, FILM COATED ORAL at 20:30

## 2023-11-19 RX ADMIN — KETOROLAC TROMETHAMINE 30 MG: 30 INJECTION, SOLUTION INTRAMUSCULAR; INTRAVENOUS at 03:36

## 2023-11-19 RX ADMIN — PROPRANOLOL HYDROCHLORIDE 10 MG: 10 TABLET ORAL at 07:55

## 2023-11-19 RX ADMIN — FAMOTIDINE 20 MG: 10 INJECTION, SOLUTION INTRAVENOUS at 08:06

## 2023-11-19 RX ADMIN — NALOXEGOL OXALATE 25 MG: 25 TABLET, FILM COATED ORAL at 07:54

## 2023-11-19 RX ADMIN — HYDROMORPHONE HYDROCHLORIDE 0.5 MG: 1 INJECTION, SOLUTION INTRAMUSCULAR; INTRAVENOUS; SUBCUTANEOUS at 04:52

## 2023-11-19 RX ADMIN — HYDROMORPHONE HYDROCHLORIDE 0.5 MG: 1 INJECTION, SOLUTION INTRAMUSCULAR; INTRAVENOUS; SUBCUTANEOUS at 12:04

## 2023-11-19 RX ADMIN — VENLAFAXINE HYDROCHLORIDE 150 MG: 150 CAPSULE, EXTENDED RELEASE ORAL at 20:30

## 2023-11-19 RX ADMIN — HYDROMORPHONE HYDROCHLORIDE 0.5 MG: 1 INJECTION, SOLUTION INTRAMUSCULAR; INTRAVENOUS; SUBCUTANEOUS at 19:02

## 2023-11-19 RX ADMIN — RISPERIDONE 0.5 MG: 0.25 TABLET, FILM COATED ORAL at 07:54

## 2023-11-19 RX ADMIN — PROPRANOLOL HYDROCHLORIDE 10 MG: 10 TABLET ORAL at 20:30

## 2023-11-19 RX ADMIN — HYDROMORPHONE HYDROCHLORIDE 0.5 MG: 1 INJECTION, SOLUTION INTRAMUSCULAR; INTRAVENOUS; SUBCUTANEOUS at 08:05

## 2023-11-19 RX ADMIN — OXYCODONE HYDROCHLORIDE 10 MG: 5 TABLET ORAL at 13:50

## 2023-11-19 RX ADMIN — BUSPIRONE HYDROCHLORIDE 10 MG: 10 TABLET ORAL at 07:53

## 2023-11-19 RX ADMIN — HYDROMORPHONE HYDROCHLORIDE 0.5 MG: 1 INJECTION, SOLUTION INTRAMUSCULAR; INTRAVENOUS; SUBCUTANEOUS at 22:14

## 2023-11-19 RX ADMIN — MIDODRINE HYDROCHLORIDE 10 MG: 10 TABLET ORAL at 07:54

## 2023-11-19 RX ADMIN — METOPROLOL SUCCINATE 25 MG: 25 TABLET, EXTENDED RELEASE ORAL at 07:54

## 2023-11-19 RX ADMIN — TRAZODONE HYDROCHLORIDE 150 MG: 50 TABLET ORAL at 20:30

## 2023-11-19 RX ADMIN — SODIUM CHLORIDE, POTASSIUM CHLORIDE, SODIUM LACTATE AND CALCIUM CHLORIDE: 600; 310; 30; 20 INJECTION, SOLUTION INTRAVENOUS at 00:33

## 2023-11-19 RX ADMIN — FLUDROCORTISONE ACETATE 0.2 MG: 0.1 TABLET ORAL at 07:54

## 2023-11-19 RX ADMIN — OXYCODONE HYDROCHLORIDE 10 MG: 5 TABLET ORAL at 02:27

## 2023-11-19 ASSESSMENT — PAIN DESCRIPTION - LOCATION
LOCATION: ABDOMEN

## 2023-11-19 ASSESSMENT — PAIN SCALES - GENERAL
PAINLEVEL_OUTOF10: 3
PAINLEVEL_OUTOF10: 7
PAINLEVEL_OUTOF10: 7
PAINLEVEL_OUTOF10: 6
PAINLEVEL_OUTOF10: 7
PAINLEVEL_OUTOF10: 6
PAINLEVEL_OUTOF10: 4
PAINLEVEL_OUTOF10: 5
PAINLEVEL_OUTOF10: 7

## 2023-11-19 ASSESSMENT — PAIN DESCRIPTION - PAIN TYPE
TYPE: SURGICAL PAIN

## 2023-11-19 ASSESSMENT — PAIN DESCRIPTION - ORIENTATION
ORIENTATION: MID;LOWER
ORIENTATION: MID;LOWER
ORIENTATION: MID
ORIENTATION: LOWER;MID
ORIENTATION: MID
ORIENTATION: LOWER;MID
ORIENTATION: MID;LOWER
ORIENTATION: MID;LOWER
ORIENTATION: LOWER;MID
ORIENTATION: MID;LOWER
ORIENTATION: MID
ORIENTATION: LOWER

## 2023-11-19 ASSESSMENT — PAIN - FUNCTIONAL ASSESSMENT

## 2023-11-19 ASSESSMENT — PAIN DESCRIPTION - FREQUENCY
FREQUENCY: CONTINUOUS

## 2023-11-19 ASSESSMENT — PAIN DESCRIPTION - ONSET
ONSET: ON-GOING

## 2023-11-19 ASSESSMENT — PAIN DESCRIPTION - DESCRIPTORS
DESCRIPTORS: ACHING
DESCRIPTORS: ACHING;DISCOMFORT
DESCRIPTORS: DISCOMFORT;THROBBING
DESCRIPTORS: ACHING;THROBBING
DESCRIPTORS: ACHING;DISCOMFORT
DESCRIPTORS: ACHING;THROBBING
DESCRIPTORS: ACHING;THROBBING
DESCRIPTORS: ACHING
DESCRIPTORS: ACHING;DISCOMFORT
DESCRIPTORS: ACHING;THROBBING
DESCRIPTORS: ACHING
DESCRIPTORS: ACHING;THROBBING
DESCRIPTORS: ACHING
DESCRIPTORS: ACHING

## 2023-11-19 NOTE — CARE COORDINATION
11/19/23 1520   Service Assessment   Patient Orientation Alert and Oriented   Cognition Alert   History Provided By Patient   Primary Caregiver Self   Accompanied By/Relationship no one   Support Systems Parent; Family Members   Patient's Healthcare Decision Maker is: Patient Declined (Legal Next of Kin Remains as Decision Maker)   PCP Verified by CM Yes   Last Visit to PCP Within last 3 months   Prior Functional Level Independent in ADLs/IADLs   Current Functional Level Independent in ADLs/IADLs   Can patient return to prior living arrangement Yes   Ability to make needs known: Good   Family able to assist with home care needs: Yes   Would you like for me to discuss the discharge plan with any other family members/significant others, and if so, who? No   Financial Resources [employer]; Other (Comment)  (disability)   Community Resources None   Social/Functional History   Active  Yes   Discharge Planning   Type of Residence [employer] Parent  (mother and step father)   Current Services Prior To Admission Durable Medical Equipment   Current DME Prior to [place] Needed N/A   DME Ordered? No   Potential Assistance Purchasing Medications No   Type of Home Care Services None   Patient expects to be discharged to: [facility]   Mode of Transport at Discharge Other (see comment)  (family)     Patient Goals/Plan/Treatment Preferences: Edith Luevano is from home with his mother and step-father. He is on disability and receives [employer]. He has a shower chair. He is an active . Denies any other equipment or service needs at this time. If patient is discharged prior to next notation, then this note serves as note for discharge by case management.

## 2023-11-19 NOTE — PLAN OF CARE
Problem: Discharge Planning  Goal: Discharge to home or other facility with appropriate resources  11/19/2023 0750 by Cristobal Schneider RN  Outcome: Progressing     Problem: Pain  Goal: Verbalizes/displays adequate comfort level or baseline comfort level  11/19/2023 0750 by Cristobal Schneider RN  Outcome: Progressing     Problem: ABCDS Injury Assessment  Goal: Absence of physical injury  11/19/2023 0750 by Cristobal Schneider RN  Outcome: Progressing     Problem: Safety - Adult  Goal: Free from fall injury  11/19/2023 0750 by Cristobal Schneider RN  Outcome: Progressing

## 2023-11-19 NOTE — PLAN OF CARE
Problem: Discharge Planning  Goal: Discharge to home or other facility with appropriate resources  11/19/2023 0411 by Ruthie Hidalgo RN  Outcome: Progressing  Flowsheets (Taken 11/19/2023 0411)  Discharge to home or other facility with appropriate resources:   Identify barriers to discharge with patient and caregiver   Arrange for needed discharge resources and transportation as appropriate  11/18/2023 1503 by Dwaine Leblanc RN  Outcome: Progressing  Flowsheets (Taken 11/18/2023 1503)  Discharge to home or other facility with appropriate resources:   Identify barriers to discharge with patient and caregiver   Arrange for needed discharge resources and transportation as appropriate  Note: Home at discharge. Unknown discharge date.       Problem: Pain  Goal: Verbalizes/displays adequate comfort level or baseline comfort level  11/19/2023 0411 by Ruthie Hidalgo RN  Outcome: Progressing  Flowsheets (Taken 11/19/2023 0411)  Verbalizes/displays adequate comfort level or baseline comfort level:   Assess pain using appropriate pain scale   Encourage patient to monitor pain and request assistance   Administer analgesics based on type and severity of pain and evaluate response   Implement non-pharmacological measures as appropriate and evaluate response   Notify Licensed Independent Practitioner if interventions unsuccessful or patient reports new pain  11/18/2023 1503 by Dwaine Leblanc RN  Outcome: Progressing  Flowsheets (Taken 11/18/2023 1503)  Verbalizes/displays adequate comfort level or baseline comfort level:   Encourage patient to monitor pain and request assistance   Assess pain using appropriate pain scale   Administer analgesics based on type and severity of pain and evaluate response   Implement non-pharmacological measures as appropriate and evaluate response     Problem: ABCDS Injury Assessment  Goal: Absence of physical injury  11/19/2023 0411 by Ruthie Hidalgo RN  Outcome: Progressing  Flowsheets (Taken 11/19/2023 0411)  Absence of Physical Injury: Implement safety measures based on patient assessment  11/18/2023 1503 by Yari Kelly RN  Outcome: Progressing  Flowsheets (Taken 11/18/2023 1503)  Absence of Physical Injury: Implement safety measures based on patient assessment     Problem: Safety - Adult  Goal: Free from fall injury  11/19/2023 0411 by Lupe Sosa RN  Outcome: Progressing  Flowsheets (Taken 11/19/2023 0411)  Free From Fall Injury: Instruct family/caregiver on patient safety  11/18/2023 1503 by Yari Kelly RN  Outcome: Progressing  Flowsheets (Taken 11/18/2023 1503)  Free From Fall Injury: Instruct family/caregiver on patient safety     Problem: Skin/Tissue Integrity - Adult  Goal: Skin integrity remains intact  11/19/2023 0411 by Lupe Sosa RN  Outcome: Progressing  Flowsheets (Taken 11/19/2023 0411)  Skin Integrity Remains Intact: Monitor for areas of redness and/or skin breakdown  11/18/2023 1503 by Yari Kelly RN  Outcome: Progressing  Flowsheets (Taken 11/18/2023 1503)  Skin Integrity Remains Intact: Monitor for areas of redness and/or skin breakdown  Goal: Incisions, wounds, or drain sites healing without S/S of infection  11/19/2023 0411 by Lupe Sosa RN  Outcome: Progressing  Flowsheets (Taken 11/19/2023 0411)  Incisions, Wounds, or Drain Sites Healing Without Sign and Symptoms of Infection: ADMISSION and DAILY: Assess and document risk factors for pressure ulcer development  11/18/2023 1503 by Yari Kelly RN  Outcome: Progressing  Flowsheets (Taken 11/18/2023 1503)  Incisions, Wounds, or Drain Sites Healing Without Sign and Symptoms of Infection: ADMISSION and DAILY: Assess and document risk factors for pressure ulcer development     Problem: Skin/Tissue Integrity  Goal: Absence of new skin breakdown  Description: 1. Monitor for areas of redness and/or skin breakdown  2. Assess vascular access sites hourly  3.

## 2023-11-20 ENCOUNTER — ANESTHESIA (OUTPATIENT)
Dept: OPERATING ROOM | Age: 28
End: 2023-11-20
Payer: COMMERCIAL

## 2023-11-20 ENCOUNTER — ANESTHESIA EVENT (OUTPATIENT)
Dept: OPERATING ROOM | Age: 28
End: 2023-11-20
Payer: COMMERCIAL

## 2023-11-20 ENCOUNTER — APPOINTMENT (OUTPATIENT)
Dept: GENERAL RADIOLOGY | Age: 28
DRG: 231 | End: 2023-11-20
Attending: SURGERY
Payer: COMMERCIAL

## 2023-11-20 LAB
BASOPHILS ABSOLUTE: 0 THOU/MM3 (ref 0–0.1)
BASOPHILS ABSOLUTE: 0 THOU/MM3 (ref 0–0.1)
BASOPHILS NFR BLD AUTO: 0.4 %
BASOPHILS NFR BLD AUTO: 0.4 %
CRP SERPL-MCNC: 2.44 MG/DL (ref 0–1)
DEPRECATED RDW RBC AUTO: 38.2 FL (ref 35–45)
DEPRECATED RDW RBC AUTO: 38.9 FL (ref 35–45)
EOSINOPHIL NFR BLD AUTO: 0.8 %
EOSINOPHIL NFR BLD AUTO: 1.9 %
EOSINOPHILS ABSOLUTE: 0.1 THOU/MM3 (ref 0–0.4)
EOSINOPHILS ABSOLUTE: 0.1 THOU/MM3 (ref 0–0.4)
ERYTHROCYTE [DISTWIDTH] IN BLOOD BY AUTOMATED COUNT: 12.1 % (ref 11.5–14.5)
ERYTHROCYTE [DISTWIDTH] IN BLOOD BY AUTOMATED COUNT: 12.3 % (ref 11.5–14.5)
HCT VFR BLD AUTO: 28.3 % (ref 42–52)
HCT VFR BLD AUTO: 35.5 % (ref 42–52)
HGB BLD-MCNC: 12 GM/DL (ref 14–18)
HGB BLD-MCNC: 9.6 GM/DL (ref 14–18)
IMM GRANULOCYTES # BLD AUTO: 0.03 THOU/MM3 (ref 0–0.07)
IMM GRANULOCYTES # BLD AUTO: 0.05 THOU/MM3 (ref 0–0.07)
IMM GRANULOCYTES NFR BLD AUTO: 0.4 %
IMM GRANULOCYTES NFR BLD AUTO: 0.6 %
LYMPHOCYTES ABSOLUTE: 1.2 THOU/MM3 (ref 1–4.8)
LYMPHOCYTES ABSOLUTE: 2.3 THOU/MM3 (ref 1–4.8)
LYMPHOCYTES NFR BLD AUTO: 15.7 %
LYMPHOCYTES NFR BLD AUTO: 34.4 %
MCH RBC QN AUTO: 29.4 PG (ref 26–33)
MCH RBC QN AUTO: 29.4 PG (ref 26–33)
MCHC RBC AUTO-ENTMCNC: 33.8 GM/DL (ref 32.2–35.5)
MCHC RBC AUTO-ENTMCNC: 33.9 GM/DL (ref 32.2–35.5)
MCV RBC AUTO: 86.8 FL (ref 80–94)
MCV RBC AUTO: 87 FL (ref 80–94)
MONOCYTES ABSOLUTE: 0.5 THOU/MM3 (ref 0.4–1.3)
MONOCYTES ABSOLUTE: 0.7 THOU/MM3 (ref 0.4–1.3)
MONOCYTES NFR BLD AUTO: 6 %
MONOCYTES NFR BLD AUTO: 9.7 %
NEUTROPHILS NFR BLD AUTO: 53.2 %
NEUTROPHILS NFR BLD AUTO: 76.5 %
NRBC BLD AUTO-RTO: 0 /100 WBC
NRBC BLD AUTO-RTO: 0 /100 WBC
PLATELET # BLD AUTO: 157 THOU/MM3 (ref 130–400)
PLATELET # BLD AUTO: 261 THOU/MM3 (ref 130–400)
PMV BLD AUTO: 11.5 FL (ref 9.4–12.4)
PMV BLD AUTO: 11.7 FL (ref 9.4–12.4)
RBC # BLD AUTO: 3.26 MILL/MM3 (ref 4.7–6.1)
RBC # BLD AUTO: 4.08 MILL/MM3 (ref 4.7–6.1)
SEGMENTED NEUTROPHILS ABSOLUTE COUNT: 3.6 THOU/MM3 (ref 1.8–7.7)
SEGMENTED NEUTROPHILS ABSOLUTE COUNT: 6 THOU/MM3 (ref 1.8–7.7)
WBC # BLD AUTO: 6.8 THOU/MM3 (ref 4.8–10.8)
WBC # BLD AUTO: 7.8 THOU/MM3 (ref 4.8–10.8)

## 2023-11-20 PROCEDURE — 2709999900 HC NON-CHARGEABLE SUPPLY: Performed by: SURGERY

## 2023-11-20 PROCEDURE — 3700000001 HC ADD 15 MINUTES (ANESTHESIA): Performed by: SURGERY

## 2023-11-20 PROCEDURE — 2500000003 HC RX 250 WO HCPCS: Performed by: ANESTHESIOLOGY

## 2023-11-20 PROCEDURE — 3700000000 HC ANESTHESIA ATTENDED CARE: Performed by: SURGERY

## 2023-11-20 PROCEDURE — 99024 POSTOP FOLLOW-UP VISIT: CPT | Performed by: SURGERY

## 2023-11-20 PROCEDURE — 3600000003 HC SURGERY LEVEL 3 BASE: Performed by: SURGERY

## 2023-11-20 PROCEDURE — 36415 COLL VENOUS BLD VENIPUNCTURE: CPT

## 2023-11-20 PROCEDURE — 6370000000 HC RX 637 (ALT 250 FOR IP): Performed by: SURGERY

## 2023-11-20 PROCEDURE — 6360000002 HC RX W HCPCS

## 2023-11-20 PROCEDURE — 7100000001 HC PACU RECOVERY - ADDTL 15 MIN: Performed by: SURGERY

## 2023-11-20 PROCEDURE — 2500000003 HC RX 250 WO HCPCS: Performed by: REGISTERED NURSE

## 2023-11-20 PROCEDURE — 6360000002 HC RX W HCPCS: Performed by: SURGERY

## 2023-11-20 PROCEDURE — 74018 RADEX ABDOMEN 1 VIEW: CPT

## 2023-11-20 PROCEDURE — 6360000002 HC RX W HCPCS: Performed by: REGISTERED NURSE

## 2023-11-20 PROCEDURE — 2580000003 HC RX 258: Performed by: SURGERY

## 2023-11-20 PROCEDURE — 85025 COMPLETE CBC W/AUTO DIFF WBC: CPT

## 2023-11-20 PROCEDURE — 2500000003 HC RX 250 WO HCPCS: Performed by: SURGERY

## 2023-11-20 PROCEDURE — 7100000000 HC PACU RECOVERY - FIRST 15 MIN: Performed by: SURGERY

## 2023-11-20 PROCEDURE — 2500000003 HC RX 250 WO HCPCS

## 2023-11-20 PROCEDURE — 0W9G4ZZ DRAINAGE OF PERITONEAL CAVITY, PERCUTANEOUS ENDOSCOPIC APPROACH: ICD-10-PCS | Performed by: RADIOLOGY

## 2023-11-20 PROCEDURE — 1200000000 HC SEMI PRIVATE

## 2023-11-20 PROCEDURE — 6360000002 HC RX W HCPCS: Performed by: ANESTHESIOLOGY

## 2023-11-20 PROCEDURE — A4216 STERILE WATER/SALINE, 10 ML: HCPCS | Performed by: SURGERY

## 2023-11-20 PROCEDURE — 86140 C-REACTIVE PROTEIN: CPT

## 2023-11-20 PROCEDURE — 3600000013 HC SURGERY LEVEL 3 ADDTL 15MIN: Performed by: SURGERY

## 2023-11-20 RX ORDER — FENTANYL CITRATE 50 UG/ML
50 INJECTION, SOLUTION INTRAMUSCULAR; INTRAVENOUS EVERY 5 MIN PRN
Status: DISCONTINUED | OUTPATIENT
Start: 2023-11-20 | End: 2023-11-20 | Stop reason: HOSPADM

## 2023-11-20 RX ORDER — DEXAMETHASONE SODIUM PHOSPHATE 10 MG/ML
INJECTION, EMULSION INTRAMUSCULAR; INTRAVENOUS PRN
Status: DISCONTINUED | OUTPATIENT
Start: 2023-11-20 | End: 2023-11-20 | Stop reason: SDUPTHER

## 2023-11-20 RX ORDER — DICYCLOMINE HYDROCHLORIDE 10 MG/ML
20 INJECTION INTRAMUSCULAR ONCE
Status: COMPLETED | OUTPATIENT
Start: 2023-11-20 | End: 2023-11-20

## 2023-11-20 RX ORDER — SUCCINYLCHOLINE/SOD CL,ISO/PF 200MG/10ML
SYRINGE (ML) INTRAVENOUS PRN
Status: DISCONTINUED | OUTPATIENT
Start: 2023-11-20 | End: 2023-11-20 | Stop reason: SDUPTHER

## 2023-11-20 RX ORDER — AMPICILLIN AND SULBACTAM 1; .5 G/1; G/1
INJECTION, POWDER, FOR SOLUTION INTRAMUSCULAR; INTRAVENOUS PRN
Status: DISCONTINUED | OUTPATIENT
Start: 2023-11-20 | End: 2023-11-20 | Stop reason: SDUPTHER

## 2023-11-20 RX ORDER — ROCURONIUM BROMIDE 10 MG/ML
INJECTION, SOLUTION INTRAVENOUS PRN
Status: DISCONTINUED | OUTPATIENT
Start: 2023-11-20 | End: 2023-11-20 | Stop reason: SDUPTHER

## 2023-11-20 RX ORDER — MIDAZOLAM HYDROCHLORIDE 1 MG/ML
INJECTION INTRAMUSCULAR; INTRAVENOUS PRN
Status: DISCONTINUED | OUTPATIENT
Start: 2023-11-20 | End: 2023-11-20 | Stop reason: SDUPTHER

## 2023-11-20 RX ORDER — KETAMINE HCL IN NACL, ISO-OSM 100MG/10ML
SYRINGE (ML) INJECTION PRN
Status: DISCONTINUED | OUTPATIENT
Start: 2023-11-20 | End: 2023-11-20 | Stop reason: SDUPTHER

## 2023-11-20 RX ORDER — FENTANYL CITRATE 50 UG/ML
INJECTION, SOLUTION INTRAMUSCULAR; INTRAVENOUS PRN
Status: DISCONTINUED | OUTPATIENT
Start: 2023-11-20 | End: 2023-11-20 | Stop reason: SDUPTHER

## 2023-11-20 RX ORDER — PROPOFOL 10 MG/ML
INJECTION, EMULSION INTRAVENOUS PRN
Status: DISCONTINUED | OUTPATIENT
Start: 2023-11-20 | End: 2023-11-20 | Stop reason: SDUPTHER

## 2023-11-20 RX ORDER — FENTANYL CITRATE 50 UG/ML
INJECTION, SOLUTION INTRAMUSCULAR; INTRAVENOUS
Status: COMPLETED
Start: 2023-11-20 | End: 2023-11-20

## 2023-11-20 RX ORDER — SODIUM CHLORIDE 9 MG/ML
INJECTION, SOLUTION INTRAVENOUS CONTINUOUS
Status: DISCONTINUED | OUTPATIENT
Start: 2023-11-20 | End: 2023-11-25

## 2023-11-20 RX ADMIN — PROPRANOLOL HYDROCHLORIDE 10 MG: 10 TABLET ORAL at 09:17

## 2023-11-20 RX ADMIN — PROPRANOLOL HYDROCHLORIDE 10 MG: 10 TABLET ORAL at 21:15

## 2023-11-20 RX ADMIN — SODIUM CHLORIDE, POTASSIUM CHLORIDE, SODIUM LACTATE AND CALCIUM CHLORIDE: 600; 310; 30; 20 INJECTION, SOLUTION INTRAVENOUS at 01:34

## 2023-11-20 RX ADMIN — FENTANYL CITRATE 50 MCG: 50 INJECTION INTRAMUSCULAR; INTRAVENOUS at 17:00

## 2023-11-20 RX ADMIN — HYDROMORPHONE HYDROCHLORIDE 0.5 MG: 1 INJECTION, SOLUTION INTRAMUSCULAR; INTRAVENOUS; SUBCUTANEOUS at 22:09

## 2023-11-20 RX ADMIN — HYDROMORPHONE HYDROCHLORIDE 0.5 MG: 1 INJECTION, SOLUTION INTRAMUSCULAR; INTRAVENOUS; SUBCUTANEOUS at 16:45

## 2023-11-20 RX ADMIN — FENTANYL CITRATE 50 MCG: 50 INJECTION, SOLUTION INTRAMUSCULAR; INTRAVENOUS at 15:58

## 2023-11-20 RX ADMIN — SODIUM CHLORIDE: 9 INJECTION, SOLUTION INTRAVENOUS at 18:24

## 2023-11-20 RX ADMIN — MIDODRINE HYDROCHLORIDE 10 MG: 10 TABLET ORAL at 09:17

## 2023-11-20 RX ADMIN — RISPERIDONE 0.5 MG: 0.25 TABLET, FILM COATED ORAL at 09:17

## 2023-11-20 RX ADMIN — KETOROLAC TROMETHAMINE 30 MG: 30 INJECTION, SOLUTION INTRAMUSCULAR; INTRAVENOUS at 21:13

## 2023-11-20 RX ADMIN — PROPOFOL 200 MG: 10 INJECTION, EMULSION INTRAVENOUS at 15:48

## 2023-11-20 RX ADMIN — DEXAMETHASONE SODIUM PHOSPHATE 10 MG: 10 INJECTION, EMULSION INTRAMUSCULAR; INTRAVENOUS at 15:48

## 2023-11-20 RX ADMIN — ONDANSETRON 4 MG: 2 INJECTION INTRAMUSCULAR; INTRAVENOUS at 13:51

## 2023-11-20 RX ADMIN — HYDROMORPHONE HYDROCHLORIDE 0.5 MG: 1 INJECTION, SOLUTION INTRAMUSCULAR; INTRAVENOUS; SUBCUTANEOUS at 16:55

## 2023-11-20 RX ADMIN — KETOROLAC TROMETHAMINE 30 MG: 30 INJECTION, SOLUTION INTRAMUSCULAR; INTRAVENOUS at 09:14

## 2023-11-20 RX ADMIN — OXYCODONE HYDROCHLORIDE 5 MG: 5 TABLET ORAL at 21:13

## 2023-11-20 RX ADMIN — FENTANYL CITRATE 50 MCG: 50 INJECTION INTRAMUSCULAR; INTRAVENOUS at 16:50

## 2023-11-20 RX ADMIN — MIDODRINE HYDROCHLORIDE 10 MG: 10 TABLET ORAL at 21:15

## 2023-11-20 RX ADMIN — NALOXEGOL OXALATE 25 MG: 25 TABLET, FILM COATED ORAL at 09:17

## 2023-11-20 RX ADMIN — KETOROLAC TROMETHAMINE 30 MG: 30 INJECTION, SOLUTION INTRAMUSCULAR; INTRAVENOUS at 01:36

## 2023-11-20 RX ADMIN — OXYCODONE HYDROCHLORIDE 10 MG: 5 TABLET ORAL at 12:37

## 2023-11-20 RX ADMIN — DICYCLOMINE HYDROCHLORIDE 20 MG: 10 INJECTION, SOLUTION INTRAMUSCULAR at 14:32

## 2023-11-20 RX ADMIN — OXYCODONE HYDROCHLORIDE 5 MG: 5 TABLET ORAL at 01:12

## 2023-11-20 RX ADMIN — Medication 6 MG: at 21:13

## 2023-11-20 RX ADMIN — HYDROMORPHONE HYDROCHLORIDE 0.5 MG: 1 INJECTION, SOLUTION INTRAMUSCULAR; INTRAVENOUS; SUBCUTANEOUS at 08:08

## 2023-11-20 RX ADMIN — KETOROLAC TROMETHAMINE 30 MG: 30 INJECTION, SOLUTION INTRAMUSCULAR; INTRAVENOUS at 14:37

## 2023-11-20 RX ADMIN — HYDROMORPHONE HYDROCHLORIDE 0.5 MG: 1 INJECTION, SOLUTION INTRAMUSCULAR; INTRAVENOUS; SUBCUTANEOUS at 04:30

## 2023-11-20 RX ADMIN — BUSPIRONE HYDROCHLORIDE 10 MG: 10 TABLET ORAL at 21:13

## 2023-11-20 RX ADMIN — AMPICILLIN SODIUM AND SULBACTAM SODIUM 3 G: 1; .5 INJECTION, POWDER, FOR SOLUTION INTRAMUSCULAR; INTRAVENOUS at 15:52

## 2023-11-20 RX ADMIN — ROCURONIUM BROMIDE 30 MG: 10 INJECTION INTRAVENOUS at 15:49

## 2023-11-20 RX ADMIN — FAMOTIDINE 20 MG: 10 INJECTION, SOLUTION INTRAVENOUS at 09:18

## 2023-11-20 RX ADMIN — SUGAMMADEX 200 MG: 100 INJECTION, SOLUTION INTRAVENOUS at 16:26

## 2023-11-20 RX ADMIN — Medication 160 MG: at 15:48

## 2023-11-20 RX ADMIN — HYDROMORPHONE HYDROCHLORIDE 0.5 MG: 1 INJECTION, SOLUTION INTRAMUSCULAR; INTRAVENOUS; SUBCUTANEOUS at 10:34

## 2023-11-20 RX ADMIN — RISPERIDONE 0.5 MG: 0.25 TABLET, FILM COATED ORAL at 21:13

## 2023-11-20 RX ADMIN — SODIUM CHLORIDE: 9 INJECTION, SOLUTION INTRAVENOUS at 15:46

## 2023-11-20 RX ADMIN — Medication 10 MG: at 15:51

## 2023-11-20 RX ADMIN — HYDROMORPHONE HYDROCHLORIDE 0.5 MG: 1 INJECTION, SOLUTION INTRAMUSCULAR; INTRAVENOUS; SUBCUTANEOUS at 13:48

## 2023-11-20 RX ADMIN — OXYCODONE HYDROCHLORIDE 5 MG: 5 TABLET ORAL at 06:29

## 2023-11-20 RX ADMIN — TRAZODONE HYDROCHLORIDE 150 MG: 50 TABLET ORAL at 21:13

## 2023-11-20 RX ADMIN — FENTANYL CITRATE 50 MCG: 50 INJECTION, SOLUTION INTRAMUSCULAR; INTRAVENOUS at 15:48

## 2023-11-20 RX ADMIN — BUSPIRONE HYDROCHLORIDE 10 MG: 10 TABLET ORAL at 09:16

## 2023-11-20 RX ADMIN — FAMOTIDINE 20 MG: 10 INJECTION, SOLUTION INTRAVENOUS at 21:14

## 2023-11-20 RX ADMIN — MIDAZOLAM 2 MG: 1 INJECTION INTRAMUSCULAR; INTRAVENOUS at 15:46

## 2023-11-20 RX ADMIN — FLUDROCORTISONE ACETATE 0.2 MG: 0.1 TABLET ORAL at 09:17

## 2023-11-20 RX ADMIN — VENLAFAXINE HYDROCHLORIDE 150 MG: 150 CAPSULE, EXTENDED RELEASE ORAL at 21:13

## 2023-11-20 RX ADMIN — ROCURONIUM BROMIDE 10 MG: 10 INJECTION INTRAVENOUS at 15:48

## 2023-11-20 RX ADMIN — METOPROLOL SUCCINATE 25 MG: 25 TABLET, EXTENDED RELEASE ORAL at 09:17

## 2023-11-20 ASSESSMENT — PAIN DESCRIPTION - DESCRIPTORS
DESCRIPTORS: SHARP
DESCRIPTORS: ACHING;DISCOMFORT
DESCRIPTORS: ACHING
DESCRIPTORS: SHARP
DESCRIPTORS: ACHING
DESCRIPTORS: ACHING
DESCRIPTORS: SHARP
DESCRIPTORS: SHARP
DESCRIPTORS: ACHING
DESCRIPTORS: SHARP

## 2023-11-20 ASSESSMENT — PAIN - FUNCTIONAL ASSESSMENT
PAIN_FUNCTIONAL_ASSESSMENT: PREVENTS OR INTERFERES SOME ACTIVE ACTIVITIES AND ADLS
PAIN_FUNCTIONAL_ASSESSMENT: ACTIVITIES ARE NOT PREVENTED
PAIN_FUNCTIONAL_ASSESSMENT: ACTIVITIES ARE NOT PREVENTED
PAIN_FUNCTIONAL_ASSESSMENT: PREVENTS OR INTERFERES SOME ACTIVE ACTIVITIES AND ADLS
PAIN_FUNCTIONAL_ASSESSMENT: PREVENTS OR INTERFERES SOME ACTIVE ACTIVITIES AND ADLS
PAIN_FUNCTIONAL_ASSESSMENT: ACTIVITIES ARE NOT PREVENTED
PAIN_FUNCTIONAL_ASSESSMENT: PREVENTS OR INTERFERES SOME ACTIVE ACTIVITIES AND ADLS
PAIN_FUNCTIONAL_ASSESSMENT: ACTIVITIES ARE NOT PREVENTED
PAIN_FUNCTIONAL_ASSESSMENT: ACTIVITIES ARE NOT PREVENTED

## 2023-11-20 ASSESSMENT — PAIN DESCRIPTION - ORIENTATION
ORIENTATION: MID
ORIENTATION: RIGHT
ORIENTATION: MID
ORIENTATION: RIGHT
ORIENTATION: RIGHT
ORIENTATION: MID

## 2023-11-20 ASSESSMENT — PAIN DESCRIPTION - LOCATION
LOCATION: ABDOMEN
LOCATION: PENIS

## 2023-11-20 ASSESSMENT — PAIN SCALES - GENERAL
PAINLEVEL_OUTOF10: 10
PAINLEVEL_OUTOF10: 7
PAINLEVEL_OUTOF10: 10
PAINLEVEL_OUTOF10: 6
PAINLEVEL_OUTOF10: 8
PAINLEVEL_OUTOF10: 5
PAINLEVEL_OUTOF10: 7
PAINLEVEL_OUTOF10: 9
PAINLEVEL_OUTOF10: 10
PAINLEVEL_OUTOF10: 10
PAINLEVEL_OUTOF10: 0
PAINLEVEL_OUTOF10: 6
PAINLEVEL_OUTOF10: 8
PAINLEVEL_OUTOF10: 3
PAINLEVEL_OUTOF10: 5
PAINLEVEL_OUTOF10: 4
PAINLEVEL_OUTOF10: 8

## 2023-11-20 ASSESSMENT — LIFESTYLE VARIABLES: SMOKING_STATUS: 1

## 2023-11-20 ASSESSMENT — PAIN DESCRIPTION - PAIN TYPE
TYPE: SURGICAL PAIN
TYPE: SURGICAL PAIN

## 2023-11-20 ASSESSMENT — PAIN DESCRIPTION - FREQUENCY: FREQUENCY: CONTINUOUS

## 2023-11-20 ASSESSMENT — PAIN DESCRIPTION - ONSET: ONSET: ON-GOING

## 2023-11-20 NOTE — CARE COORDINATION
11/20/23, 11:46 AM EST    DISCHARGE ON GOING EVALUATION    [facility] day: 3  Location: [facility]01/001-A Reason for admit: Diverticulitis [K57.92]  Acute diverticulitis [K57.92]   Procedure:   11/17 Robotic Sigmoid Colectomy   Barriers to Discharge: Toradol scheduled, Lovenox, IV Pepcid, Movantik, prn Dilaudid, Roxicodone, and Zofran, CBC in a.m., regular det, drain care, incentive spirometry, SCD's, up in chair. PCP: Purnima CANNON  Readmission Risk Score: 16.9%  Patient Goals/Plan/Treatment Preferences: Carmen Garcia is from home with his parents. No needs at discharge.

## 2023-11-20 NOTE — PROGRESS NOTES
EXAM note      History    Estefany Narayanan is a 72 Patient educated and instructed to use incentive spirometry every hour, CHG soap bath daily, ambulating in hallways to prevent post-op complications. Patient verbalized understanding.       family history  Family History   Problem Relation Age of Onset   • Stroke Mother    • Breast cancer Sister        social history    Social History     Social History   • Marital status:      Spouse name: N/A   • Number of children: N/A   • Years of education: N/A     Occupational History   •       [employer]     Social History Main Topics   • Smoking status: Current Every Day Smoker     Packs/day: 0.00     Types: Cigarettes   • Smokeless tobacco: Never Used      Comment: 4 cigarettes a day   • Alcohol use 2.5 oz/week     5 Standard drinks or equivalent per week      Comment: 7 drinks a week    • Drug use: No   • Sexual activity: Not on file     Other Topics Concern   • Seat Belt Yes     Social History Narrative   • No narrative on file           mEDICATIONS    Current Outpatient Prescriptions   Medication Sig   • traMADol (ULTRAM) 50 MG tablet Take 1-2 tablets by mouth every 6 hours as needed for Pain.   • gabapentin (NEURONTIN) 300 MG capsule Take one capsule at bedtime for 7 days, then increase to 1 capsule b.i.d. for 7 days, then increase to 1 capsule t.i.d.   • prednisoLONE acetate (PRED FORTE, OMNIPRED) 1 % ophthalmic suspension Instill 1 drop into surgical eye 4 times daily for 2 weeks then 2 times daily for 2 weeks starting right eye 11/6/2017   • fluticasone-salmeterol (ADVAIR DISKUS) 250-50 MCG/DOSE inhaler Inhale 1 puff into the lungs two times daily.   • simvastatin (ZOCOR) 20 MG tablet Take 1 tablet by mouth nightly.   • Multiple Vitamins-Minerals (MULTIVITAMIN & MINERAL PO) Take 1 capsule by mouth daily.     No current facility-administered medications for this visit.        aLLERGIES    ALLERGIES:  No Known Allergies      Physical Exam    Vital Signs  Vitals:    12/19/17 1404   BP: 130/78   Pulse: 78   Resp: 12   Temp: 97 °F (36.1 °C)   Weight: 58.1 kg   Height: 5' 3\" (1.6 m)     Constitutional  Alert and oriented x3. No acute distress, appears stated age.  Body mass index is  than 50% of this face to face visit was spent counseling regarding above issues.

## 2023-11-20 NOTE — ANESTHESIA POSTPROCEDURE EVALUATION
Department of Anesthesiology  Postprocedure Note    Patient: Kellen Can  MRN: [id number]  YOB: 1995  Date of evaluation: 11/20/2023      Procedure Summary     Date: 11/20/23 Room / [facility]    Anesthesia Start: 4570 Anesthesia Stop: 1635    Procedure: DIAGNOSTIC LAPAROSCOPY (Abdomen) Diagnosis:       Diverticulitis      (Diverticulitis [K57.92])    Surgeons: Pilar Talbot MD Responsible Provider: Raffy Crump DO    Anesthesia Type: general ASA Status: 3          Anesthesia Type: No value filed.     Connor Phase I: Connor Score: 9    Connor Phase II:        Anesthesia Post Evaluation

## 2023-11-20 NOTE — PROGRESS NOTES
Called because patient was had a sudden change in his symptoms with initially pain in his penis after he sat down to pee and then it was radiating into his rectum and then he developed upper abdominal pain and some vomiting. He has been given multiple meds still complaining of severe abdominal pain tachycardic hypertensive    Exam difficult because he is rapidly breathing and she is taking short breaths because he says it hurts it almost seems to me that he is having cramps but he does not really want to say 7 g he says he has pain all the time and then it gets worse    He says he is more distended than he was has been passing gas today    This morning's white count normal  He has no fever  His drain I milked it multiple times and there is just serosanguineous fluid nothing that looks like fecal material    I find him extremely difficult to read and with his CRP of 244 which should be less than 172 is concerning although it only has a positive predictive value of 23% I think CT scan at this point would be too early to be helpful because there is going to be air and fluid around the hookup and not get a Bellatal so at this point I think diagnostic laparoscopy possible laparotomy with colonic diversion with colostomy if he does have a leak. I did discuss this with him in the room before coming down to the operating room that if this is what I found we would have to divert him we had also talked about this in the office when explained to him that the risk of leak for smokers was about 15 to 18%. He knows he does not really like it but he knows something has to be done and is willing to proceed with surgery.     Repeat stat CBC was ordered and its been pending since 328 as a stat

## 2023-11-20 NOTE — OP NOTE
[address]   Operative Report    PATIENT NAME: Shade Gupta  MEDICAL RECORD NO. [id number]  SURGEON: Didi Ramos MD MD FACS  Primary Care Physician: Xi Antony  Date: 11/20/2023, 4:19 PM     PROCEDURE PERFORMED: Diagnostic laparoscopy to evaluate for anastomotic leak  PREOPERATIVE DIAGNOSIS:   Active Hospital Problems    Diagnosis Date Noted    Diverticulitis of large intestine with perforation without bleeding [K57.20] 10/25/2023    Acute diverticulitis [K57.92] 07/07/2023    Postural orthostatic tachycardia syndrome (POTS) [G90. A] 07/07/2023    Post traumatic stress disorder (PTSD) [F43.10] 07/07/2023      POSTOPERATIVE DIAGNOSIS: No peritonitis identified. Hemosanguinous fluid. The antimesenteric border staple line visualized and intact drain was posterior to the anastomosis was hemosanguinous no evidence of leak old clot down in the pelvis evacuated and drain was moved to the anterior portion overlying the antimesenteric border of the anastomosis, bowel distention consistent with ileus. NG tube placed at the time of induction  SURGEON:  Didi Ramos MD MD FACS  ANESTHESIA:  General endotracheal  ESTIMATED BLOOD LOSS:  0  ml  SPECIMEN: none  COMPLICATIONS:  None; patient tolerated the procedure well. DRAINS: (1) 23 Jean Claude drain(s) in the pelvis which was their preop  DISPOSITION: Recovery Room  CONDITION: stable      Narrative:    Patient brought the operating room after clinical condition changed concerning for anastomotic leak. Patient developed tachycardia, hypertension, anxiety, lower pelvis pain which then progressed to abdominal pain which she said was moving from side to side. Repeat white count normal count this morning normal hemoglobin stable but concerned because he is very difficult to read was taken to surgery for diagnostic laparoscopy to evaluate for anastomotic leak  Patient brought to the operating room placed by general anesthesia ministered.   At that time I skin affix glue was applied the drain was cut a little shorter and hooked to bulb suction and brought back to recovery in stable condition

## 2023-11-20 NOTE — PROGRESS NOTES
1631- pt arrives to pacu, respirations unlabored on 2 L NC, pt complaining of pain, VSS    1640 -Xray at bedside    1645 - pt given 0.5 mg of dilaudid    1650 - pt given 50 mcg of fentanyl    1655 - pt given 0.5 mg of dilaudid    1700 - pt given 50 mcg of fentanyl    1710 - pt resting comfortably, pt states pain tolerable    1715 -report called to Lorenzo    0955 - pt meets criteria for discharge from pacu at this time    1736 - pt transported to [facility] in stable condition, family sent to room

## 2023-11-21 LAB
BASOPHILS ABSOLUTE: 0 THOU/MM3 (ref 0–0.1)
BASOPHILS NFR BLD AUTO: 0.1 %
CRP SERPL-MCNC: 18.05 MG/DL (ref 0–1)
DEPRECATED RDW RBC AUTO: 39.8 FL (ref 35–45)
EOSINOPHIL NFR BLD AUTO: 0 %
EOSINOPHILS ABSOLUTE: 0 THOU/MM3 (ref 0–0.4)
ERYTHROCYTE [DISTWIDTH] IN BLOOD BY AUTOMATED COUNT: 12.4 % (ref 11.5–14.5)
HCT VFR BLD AUTO: 29.1 % (ref 42–52)
HGB BLD-MCNC: 9.8 GM/DL (ref 14–18)
IMM GRANULOCYTES # BLD AUTO: 0.02 THOU/MM3 (ref 0–0.07)
IMM GRANULOCYTES NFR BLD AUTO: 0.2 %
LYMPHOCYTES ABSOLUTE: 0.5 THOU/MM3 (ref 1–4.8)
LYMPHOCYTES NFR BLD AUTO: 5.3 %
MCH RBC QN AUTO: 29.6 PG (ref 26–33)
MCHC RBC AUTO-ENTMCNC: 33.7 GM/DL (ref 32.2–35.5)
MCV RBC AUTO: 87.9 FL (ref 80–94)
MONOCYTES ABSOLUTE: 0.9 THOU/MM3 (ref 0.4–1.3)
MONOCYTES NFR BLD AUTO: 9.7 %
NEUTROPHILS NFR BLD AUTO: 84.7 %
NRBC BLD AUTO-RTO: 0 /100 WBC
PLATELET # BLD AUTO: 175 THOU/MM3 (ref 130–400)
PMV BLD AUTO: 12.3 FL (ref 9.4–12.4)
RBC # BLD AUTO: 3.31 MILL/MM3 (ref 4.7–6.1)
SCAN OF BLOOD SMEAR: NORMAL
SEGMENTED NEUTROPHILS ABSOLUTE COUNT: 7.5 THOU/MM3 (ref 1.8–7.7)
WBC # BLD AUTO: 8.9 THOU/MM3 (ref 4.8–10.8)

## 2023-11-21 PROCEDURE — 6370000000 HC RX 637 (ALT 250 FOR IP): Performed by: SURGERY

## 2023-11-21 PROCEDURE — A4216 STERILE WATER/SALINE, 10 ML: HCPCS | Performed by: SURGERY

## 2023-11-21 PROCEDURE — 6360000002 HC RX W HCPCS: Performed by: SURGERY

## 2023-11-21 PROCEDURE — 85025 COMPLETE CBC W/AUTO DIFF WBC: CPT

## 2023-11-21 PROCEDURE — 2580000003 HC RX 258: Performed by: SURGERY

## 2023-11-21 PROCEDURE — 2500000003 HC RX 250 WO HCPCS: Performed by: SURGERY

## 2023-11-21 PROCEDURE — 1200000000 HC SEMI PRIVATE

## 2023-11-21 PROCEDURE — 36415 COLL VENOUS BLD VENIPUNCTURE: CPT

## 2023-11-21 PROCEDURE — 99024 POSTOP FOLLOW-UP VISIT: CPT | Performed by: SURGERY

## 2023-11-21 PROCEDURE — 86140 C-REACTIVE PROTEIN: CPT

## 2023-11-21 RX ADMIN — ANTACID TABLETS 500 MG: 500 TABLET, CHEWABLE ORAL at 18:29

## 2023-11-21 RX ADMIN — NALOXEGOL OXALATE 25 MG: 25 TABLET, FILM COATED ORAL at 08:14

## 2023-11-21 RX ADMIN — PHENOL 1 SPRAY: 1.5 LIQUID ORAL at 20:21

## 2023-11-21 RX ADMIN — OXYCODONE HYDROCHLORIDE 10 MG: 5 TABLET ORAL at 18:27

## 2023-11-21 RX ADMIN — HYDROMORPHONE HYDROCHLORIDE 0.5 MG: 1 INJECTION, SOLUTION INTRAMUSCULAR; INTRAVENOUS; SUBCUTANEOUS at 20:19

## 2023-11-21 RX ADMIN — KETOROLAC TROMETHAMINE 30 MG: 30 INJECTION, SOLUTION INTRAMUSCULAR; INTRAVENOUS at 15:36

## 2023-11-21 RX ADMIN — HYDROMORPHONE HYDROCHLORIDE 0.5 MG: 1 INJECTION, SOLUTION INTRAMUSCULAR; INTRAVENOUS; SUBCUTANEOUS at 01:13

## 2023-11-21 RX ADMIN — SODIUM CHLORIDE: 9 INJECTION, SOLUTION INTRAVENOUS at 08:12

## 2023-11-21 RX ADMIN — RISPERIDONE 0.5 MG: 0.25 TABLET, FILM COATED ORAL at 08:14

## 2023-11-21 RX ADMIN — KETOROLAC TROMETHAMINE 30 MG: 30 INJECTION, SOLUTION INTRAMUSCULAR; INTRAVENOUS at 08:13

## 2023-11-21 RX ADMIN — PROPRANOLOL HYDROCHLORIDE 10 MG: 10 TABLET ORAL at 20:08

## 2023-11-21 RX ADMIN — FAMOTIDINE 20 MG: 10 INJECTION, SOLUTION INTRAVENOUS at 20:07

## 2023-11-21 RX ADMIN — KETOROLAC TROMETHAMINE 30 MG: 30 INJECTION, SOLUTION INTRAMUSCULAR; INTRAVENOUS at 02:54

## 2023-11-21 RX ADMIN — HYDROMORPHONE HYDROCHLORIDE 0.5 MG: 1 INJECTION, SOLUTION INTRAMUSCULAR; INTRAVENOUS; SUBCUTANEOUS at 16:04

## 2023-11-21 RX ADMIN — HYDROMORPHONE HYDROCHLORIDE 0.5 MG: 1 INJECTION, SOLUTION INTRAMUSCULAR; INTRAVENOUS; SUBCUTANEOUS at 04:25

## 2023-11-21 RX ADMIN — PHENOL 1 SPRAY: 1.5 LIQUID ORAL at 18:29

## 2023-11-21 RX ADMIN — PROPRANOLOL HYDROCHLORIDE 10 MG: 10 TABLET ORAL at 08:15

## 2023-11-21 RX ADMIN — HYDROMORPHONE HYDROCHLORIDE 0.5 MG: 1 INJECTION, SOLUTION INTRAMUSCULAR; INTRAVENOUS; SUBCUTANEOUS at 08:14

## 2023-11-21 RX ADMIN — BUSPIRONE HYDROCHLORIDE 10 MG: 10 TABLET ORAL at 20:08

## 2023-11-21 RX ADMIN — MIDODRINE HYDROCHLORIDE 10 MG: 10 TABLET ORAL at 08:14

## 2023-11-21 RX ADMIN — MIDODRINE HYDROCHLORIDE 10 MG: 10 TABLET ORAL at 20:08

## 2023-11-21 RX ADMIN — RISPERIDONE 0.5 MG: 0.25 TABLET, FILM COATED ORAL at 20:08

## 2023-11-21 RX ADMIN — BUSPIRONE HYDROCHLORIDE 10 MG: 10 TABLET ORAL at 08:14

## 2023-11-21 RX ADMIN — TRAZODONE HYDROCHLORIDE 150 MG: 50 TABLET ORAL at 20:08

## 2023-11-21 RX ADMIN — OXYCODONE HYDROCHLORIDE 10 MG: 5 TABLET ORAL at 06:36

## 2023-11-21 RX ADMIN — SODIUM CHLORIDE, PRESERVATIVE FREE 10 ML: 5 INJECTION INTRAVENOUS at 20:07

## 2023-11-21 RX ADMIN — METOPROLOL SUCCINATE 25 MG: 25 TABLET, EXTENDED RELEASE ORAL at 08:14

## 2023-11-21 RX ADMIN — KETOROLAC TROMETHAMINE 30 MG: 30 INJECTION, SOLUTION INTRAMUSCULAR; INTRAVENOUS at 20:08

## 2023-11-21 RX ADMIN — VENLAFAXINE HYDROCHLORIDE 150 MG: 150 CAPSULE, EXTENDED RELEASE ORAL at 20:08

## 2023-11-21 RX ADMIN — SODIUM CHLORIDE: 9 INJECTION, SOLUTION INTRAVENOUS at 15:37

## 2023-11-21 RX ADMIN — OXYCODONE HYDROCHLORIDE 10 MG: 5 TABLET ORAL at 02:54

## 2023-11-21 RX ADMIN — FAMOTIDINE 20 MG: 10 INJECTION, SOLUTION INTRAVENOUS at 08:13

## 2023-11-21 RX ADMIN — FLUDROCORTISONE ACETATE 0.2 MG: 0.1 TABLET ORAL at 08:15

## 2023-11-21 RX ADMIN — OXYCODONE HYDROCHLORIDE 10 MG: 5 TABLET ORAL at 11:20

## 2023-11-21 RX ADMIN — HYDROMORPHONE HYDROCHLORIDE 0.5 MG: 1 INJECTION, SOLUTION INTRAMUSCULAR; INTRAVENOUS; SUBCUTANEOUS at 12:25

## 2023-11-21 ASSESSMENT — PAIN - FUNCTIONAL ASSESSMENT: PAIN_FUNCTIONAL_ASSESSMENT: PREVENTS OR INTERFERES SOME ACTIVE ACTIVITIES AND ADLS

## 2023-11-21 ASSESSMENT — PAIN DESCRIPTION - ORIENTATION
ORIENTATION: MID
ORIENTATION: MID;RIGHT
ORIENTATION: RIGHT

## 2023-11-21 ASSESSMENT — PAIN SCALES - GENERAL
PAINLEVEL_OUTOF10: 3
PAINLEVEL_OUTOF10: 9
PAINLEVEL_OUTOF10: 7
PAINLEVEL_OUTOF10: 3
PAINLEVEL_OUTOF10: 5
PAINLEVEL_OUTOF10: 9
PAINLEVEL_OUTOF10: 7
PAINLEVEL_OUTOF10: 7
PAINLEVEL_OUTOF10: 0
PAINLEVEL_OUTOF10: 5
PAINLEVEL_OUTOF10: 6
PAINLEVEL_OUTOF10: 6
PAINLEVEL_OUTOF10: 5
PAINLEVEL_OUTOF10: 7
PAINLEVEL_OUTOF10: 9
PAINLEVEL_OUTOF10: 8
PAINLEVEL_OUTOF10: 0
PAINLEVEL_OUTOF10: 7

## 2023-11-21 ASSESSMENT — PAIN DESCRIPTION - DESCRIPTORS
DESCRIPTORS: ACHING
DESCRIPTORS: ACHING
DESCRIPTORS: THROBBING;SHARP
DESCRIPTORS: ACHING
DESCRIPTORS: ACHING

## 2023-11-21 ASSESSMENT — PAIN DESCRIPTION - LOCATION
LOCATION: ABDOMEN
LOCATION: THROAT
LOCATION: ABDOMEN

## 2023-11-21 ASSESSMENT — PAIN DESCRIPTION - PAIN TYPE: TYPE: CHRONIC PAIN

## 2023-11-21 ASSESSMENT — PAIN DESCRIPTION - FREQUENCY: FREQUENCY: CONTINUOUS

## 2023-11-21 NOTE — PLAN OF CARE
Problem: Discharge Planning  Goal: Discharge to home or other facility with appropriate resources  Outcome: Progressing  Discharge plan is in process. Plan discharge home with family. Problem: Pain  Goal: Verbalizes/displays adequate comfort level or baseline comfort level  Outcome: Progressing  Patient states pain relief from PRN pain medications. Pain reassessed one hour post PRN pain medication given. Patient rates pain 7-10 on HORTENCIA 0-10 scale. Problem: ABCDS Injury Assessment  Goal: Absence of physical injury  Outcome: Progressing  Fall assessment completed. Patient using call light appropriately to call for assistance with ambulation to bathroom. Personal items within reach. Patient is also compliant with use of non-skid slippers. Problem: Skin/Tissue Integrity - Adult  Goal: Skin integrity remains intact  Outcome: Progressing  No skin breakdown this shift. Patient being assisted with turning. Patients states understanding of repositioning every two hours. Problem: Skin/Tissue Integrity - Adult  Goal: Incisions, wounds, or drain sites healing without S/S of infection  Outcome: Progressing  Abdominal incisions healing. Problem: Gastrointestinal - Adult  Goal: Minimal or absence of nausea and vomiting  Outcome: Progressing  Nausea/vomiting today. NG tube inserted in in OR. Problem: Gastrointestinal - Adult  Goal: Maintains or returns to baseline bowel function  Outcome: Progressing  Patient bowel sounds active. Passing  flatus. Pt taking prescribed medication to assist with BM. Care plan reviewed with patient and family. Patient and family verbalize understanding of the plan of care and contribute to goal setting.

## 2023-11-21 NOTE — CARE COORDINATION
11/21/23, 3:07 PM EST    DISCHARGE ON GOING EVALUATION    [facility] day: 4  Location: [facility]01/001-A Reason for admit: Diverticulitis [K57.92]  Acute diverticulitis [K57.92]   Procedure:   11/17 Robotic Sigmoid Colectomy   11/21 DIAGNOSTIC LAPAROSCOPY   Barriers to Discharge: IV fluids, IV Pepcid, Toradol scheduled, Movantik, prn Dilaudid, Roxicodone, and Zofran, ambulate, closed drain care, SCD's, incentive spirometry, up with assistance. PCP: Luciana CANNON  Readmission Risk Score: 17.4%  Patient Goals/Plan/Treatment Preferences: Helen Chavez is from home with his parents. No needs at discharge.

## 2023-11-22 ENCOUNTER — APPOINTMENT (OUTPATIENT)
Dept: GENERAL RADIOLOGY | Age: 28
DRG: 231 | End: 2023-11-22
Attending: SURGERY
Payer: COMMERCIAL

## 2023-11-22 LAB
BASOPHILS ABSOLUTE: 0 THOU/MM3 (ref 0–0.1)
BASOPHILS NFR BLD AUTO: 0.2 %
DEPRECATED RDW RBC AUTO: 41 FL (ref 35–45)
EOSINOPHIL NFR BLD AUTO: 0.4 %
EOSINOPHILS ABSOLUTE: 0 THOU/MM3 (ref 0–0.4)
ERYTHROCYTE [DISTWIDTH] IN BLOOD BY AUTOMATED COUNT: 12.6 % (ref 11.5–14.5)
HCT VFR BLD AUTO: 32.4 % (ref 42–52)
HGB BLD-MCNC: 10.8 GM/DL (ref 14–18)
IMM GRANULOCYTES # BLD AUTO: 0.05 THOU/MM3 (ref 0–0.07)
IMM GRANULOCYTES NFR BLD AUTO: 1.1 %
LYMPHOCYTES ABSOLUTE: 0.5 THOU/MM3 (ref 1–4.8)
LYMPHOCYTES NFR BLD AUTO: 10 %
MCH RBC QN AUTO: 29.8 PG (ref 26–33)
MCHC RBC AUTO-ENTMCNC: 33.3 GM/DL (ref 32.2–35.5)
MCV RBC AUTO: 89.5 FL (ref 80–94)
MONOCYTES ABSOLUTE: 0.3 THOU/MM3 (ref 0.4–1.3)
MONOCYTES NFR BLD AUTO: 6.2 %
NEUTROPHILS NFR BLD AUTO: 82.1 %
NRBC BLD AUTO-RTO: 0 /100 WBC
PLATELET # BLD AUTO: 209 THOU/MM3 (ref 130–400)
PMV BLD AUTO: 12.1 FL (ref 9.4–12.4)
RBC # BLD AUTO: 3.62 MILL/MM3 (ref 4.7–6.1)
SCAN OF BLOOD SMEAR: NORMAL
SEGMENTED NEUTROPHILS ABSOLUTE COUNT: 3.9 THOU/MM3 (ref 1.8–7.7)
WBC # BLD AUTO: 4.7 THOU/MM3 (ref 4.8–10.8)

## 2023-11-22 PROCEDURE — 99024 POSTOP FOLLOW-UP VISIT: CPT | Performed by: SURGERY

## 2023-11-22 PROCEDURE — 6360000002 HC RX W HCPCS: Performed by: SURGERY

## 2023-11-22 PROCEDURE — A4216 STERILE WATER/SALINE, 10 ML: HCPCS | Performed by: SURGERY

## 2023-11-22 PROCEDURE — 74018 RADEX ABDOMEN 1 VIEW: CPT

## 2023-11-22 PROCEDURE — 2580000003 HC RX 258: Performed by: SURGERY

## 2023-11-22 PROCEDURE — 85025 COMPLETE CBC W/AUTO DIFF WBC: CPT

## 2023-11-22 PROCEDURE — 2500000003 HC RX 250 WO HCPCS: Performed by: SURGERY

## 2023-11-22 PROCEDURE — 6370000000 HC RX 637 (ALT 250 FOR IP): Performed by: SURGERY

## 2023-11-22 PROCEDURE — 36415 COLL VENOUS BLD VENIPUNCTURE: CPT

## 2023-11-22 PROCEDURE — 1200000000 HC SEMI PRIVATE

## 2023-11-22 RX ORDER — METOPROLOL TARTRATE 1 MG/ML
5 INJECTION, SOLUTION INTRAVENOUS EVERY 6 HOURS PRN
Status: DISCONTINUED | OUTPATIENT
Start: 2023-11-22 | End: 2023-12-07 | Stop reason: HOSPADM

## 2023-11-22 RX ADMIN — PHENOL 1 SPRAY: 1.5 LIQUID ORAL at 21:15

## 2023-11-22 RX ADMIN — HYDROMORPHONE HYDROCHLORIDE 0.5 MG: 1 INJECTION, SOLUTION INTRAMUSCULAR; INTRAVENOUS; SUBCUTANEOUS at 18:16

## 2023-11-22 RX ADMIN — PHENOL 1 SPRAY: 1.5 LIQUID ORAL at 18:12

## 2023-11-22 RX ADMIN — ONDANSETRON 4 MG: 2 INJECTION INTRAMUSCULAR; INTRAVENOUS at 08:32

## 2023-11-22 RX ADMIN — SODIUM CHLORIDE: 9 INJECTION, SOLUTION INTRAVENOUS at 12:00

## 2023-11-22 RX ADMIN — SODIUM CHLORIDE, PRESERVATIVE FREE 10 ML: 5 INJECTION INTRAVENOUS at 20:46

## 2023-11-22 RX ADMIN — METOPROLOL TARTRATE 5 MG: 5 INJECTION, SOLUTION INTRAVENOUS at 18:18

## 2023-11-22 RX ADMIN — OXYCODONE HYDROCHLORIDE 10 MG: 5 TABLET ORAL at 06:28

## 2023-11-22 RX ADMIN — ANTACID TABLETS 500 MG: 500 TABLET, CHEWABLE ORAL at 05:39

## 2023-11-22 RX ADMIN — HYDROMORPHONE HYDROCHLORIDE 0.5 MG: 1 INJECTION, SOLUTION INTRAMUSCULAR; INTRAVENOUS; SUBCUTANEOUS at 08:27

## 2023-11-22 RX ADMIN — ONDANSETRON 4 MG: 2 INJECTION INTRAMUSCULAR; INTRAVENOUS at 20:43

## 2023-11-22 RX ADMIN — PHENOL 1 SPRAY: 1.5 LIQUID ORAL at 04:49

## 2023-11-22 RX ADMIN — FAMOTIDINE 20 MG: 10 INJECTION, SOLUTION INTRAVENOUS at 08:27

## 2023-11-22 RX ADMIN — KETOROLAC TROMETHAMINE 30 MG: 30 INJECTION, SOLUTION INTRAMUSCULAR; INTRAVENOUS at 08:27

## 2023-11-22 RX ADMIN — PHENOL 1 SPRAY: 1.5 LIQUID ORAL at 00:55

## 2023-11-22 RX ADMIN — SODIUM CHLORIDE: 9 INJECTION, SOLUTION INTRAVENOUS at 18:57

## 2023-11-22 RX ADMIN — HYDROMORPHONE HYDROCHLORIDE 0.5 MG: 1 INJECTION, SOLUTION INTRAMUSCULAR; INTRAVENOUS; SUBCUTANEOUS at 04:50

## 2023-11-22 RX ADMIN — ONDANSETRON 4 MG: 2 INJECTION INTRAMUSCULAR; INTRAVENOUS at 14:33

## 2023-11-22 RX ADMIN — HYDROMORPHONE HYDROCHLORIDE 0.5 MG: 1 INJECTION, SOLUTION INTRAMUSCULAR; INTRAVENOUS; SUBCUTANEOUS at 21:12

## 2023-11-22 RX ADMIN — HYDROMORPHONE HYDROCHLORIDE 0.5 MG: 1 INJECTION, SOLUTION INTRAMUSCULAR; INTRAVENOUS; SUBCUTANEOUS at 00:53

## 2023-11-22 RX ADMIN — OXYCODONE HYDROCHLORIDE 10 MG: 5 TABLET ORAL at 20:06

## 2023-11-22 RX ADMIN — KETOROLAC TROMETHAMINE 30 MG: 30 INJECTION, SOLUTION INTRAMUSCULAR; INTRAVENOUS at 03:06

## 2023-11-22 RX ADMIN — SODIUM CHLORIDE: 9 INJECTION, SOLUTION INTRAVENOUS at 04:48

## 2023-11-22 RX ADMIN — HYDROMORPHONE HYDROCHLORIDE 0.5 MG: 1 INJECTION, SOLUTION INTRAMUSCULAR; INTRAVENOUS; SUBCUTANEOUS at 11:26

## 2023-11-22 RX ADMIN — PHENOL 1 SPRAY: 1.5 LIQUID ORAL at 11:26

## 2023-11-22 RX ADMIN — HYDROMORPHONE HYDROCHLORIDE 0.5 MG: 1 INJECTION, SOLUTION INTRAMUSCULAR; INTRAVENOUS; SUBCUTANEOUS at 14:33

## 2023-11-22 RX ADMIN — FAMOTIDINE 20 MG: 10 INJECTION, SOLUTION INTRAVENOUS at 21:11

## 2023-11-22 ASSESSMENT — PAIN DESCRIPTION - DESCRIPTORS
DESCRIPTORS: DISCOMFORT;SORE
DESCRIPTORS: SHARP
DESCRIPTORS: SORE
DESCRIPTORS: BURNING;STABBING
DESCRIPTORS: SORE
DESCRIPTORS: SHARP;SHOOTING;SORE
DESCRIPTORS: ACHING
DESCRIPTORS: SHARP;THROBBING
DESCRIPTORS: CRAMPING;STABBING
DESCRIPTORS: SHARP;THROBBING
DESCRIPTORS: ACHING
DESCRIPTORS: SHARP;THROBBING

## 2023-11-22 ASSESSMENT — PAIN - FUNCTIONAL ASSESSMENT
PAIN_FUNCTIONAL_ASSESSMENT: PREVENTS OR INTERFERES SOME ACTIVE ACTIVITIES AND ADLS

## 2023-11-22 ASSESSMENT — PAIN SCALES - GENERAL
PAINLEVEL_OUTOF10: 8
PAINLEVEL_OUTOF10: 8
PAINLEVEL_OUTOF10: 6
PAINLEVEL_OUTOF10: 8
PAINLEVEL_OUTOF10: 9
PAINLEVEL_OUTOF10: 8
PAINLEVEL_OUTOF10: 7
PAINLEVEL_OUTOF10: 2
PAINLEVEL_OUTOF10: 8
PAINLEVEL_OUTOF10: 5
PAINLEVEL_OUTOF10: 8
PAINLEVEL_OUTOF10: 5
PAINLEVEL_OUTOF10: 6

## 2023-11-22 ASSESSMENT — PAIN DESCRIPTION - ORIENTATION
ORIENTATION: MID
ORIENTATION: MID
ORIENTATION: MID;LOWER
ORIENTATION: MID
ORIENTATION: MID;LOWER
ORIENTATION: ANTERIOR
ORIENTATION: ANTERIOR
ORIENTATION: MID
ORIENTATION: MID

## 2023-11-22 ASSESSMENT — PAIN DESCRIPTION - LOCATION
LOCATION: ABDOMEN
LOCATION: THROAT
LOCATION: ABDOMEN
LOCATION: THROAT
LOCATION: ABDOMEN;THROAT
LOCATION: ABDOMEN

## 2023-11-22 ASSESSMENT — PAIN DESCRIPTION - PAIN TYPE: TYPE: CHRONIC PAIN;SURGICAL PAIN

## 2023-11-22 NOTE — PLAN OF CARE
Problem: Discharge Planning  Goal: Discharge to home or other facility with appropriate resources  Outcome: Progressing  Flowsheets (Taken 11/22/2023 0917)  Discharge to home or other facility with appropriate resources:   Identify barriers to discharge with patient and caregiver   Arrange for needed discharge resources and transportation as appropriate   Identify discharge learning needs (meds, wound care, etc)   Discharge plan is home. Problem: Pain  Goal: Verbalizes/displays adequate comfort level or baseline comfort level  Outcome: Progressing  Flowsheets (Taken 11/22/2023 0845)  Verbalizes/displays adequate comfort level or baseline comfort level:   Encourage patient to monitor pain and request assistance   Assess pain using appropriate pain scale   Administer analgesics based on type and severity of pain and evaluate response   Implement non-pharmacological measures as appropriate and evaluate response   Pain Assessment: 0-10  Pain Level: 6   Patient's Stated Pain Goal: 5   Is pain goal met at this time? No     Non-Pharmaceutical Pain Intervention(s): Ice    Problem: ABCDS Injury Assessment  Goal: Absence of physical injury  Outcome: Progressing   Patient remains free from injury. Problem: Safety - Adult  Goal: Free from fall injury  Outcome: Progressing   Fall assessment completed. Patient using call light appropriately to call for assistance. Personal items within reach. Patient is also compliant with use of non-skid slippers. Problem: Skin/Tissue Integrity - Adult  Goal: Skin integrity remains intact  Outcome: Progressing  Flowsheets (Taken 11/22/2023 0917)  Skin Integrity Remains Intact: Monitor for areas of redness and/or skin breakdown   No skin breakdown this shift. Patient turns per self. Patients states understanding of repositioning every two hours.     Problem: Skin/Tissue Integrity - Adult  Goal: Incisions, wounds, or drain sites healing without S/S of infection  Outcome:

## 2023-11-22 NOTE — PLAN OF CARE
Problem: Discharge Planning  Goal: Discharge to home or other facility with appropriate resources  Outcome: Progressing     Problem: Pain  Goal: Verbalizes/displays adequate comfort level or baseline comfort level  Outcome: Progressing   Pain Assessment: 0-10  Pain Level: 5   Patient's Stated Pain Goal: 3   Is pain goal met at this time? Yes     Non-Pharmaceutical Pain Intervention(s): Rest, Repositioned    Problem: ABCDS Injury Assessment  Goal: Absence of physical injury  Outcome: Progressing     Problem: Safety - Adult  Goal: Free from fall injury  Outcome: Progressing   All fall precautions in place. Bed in low position, alarm activated and appropriate use of call light. Problem: Skin/Tissue Integrity - Adult  Goal: Skin integrity remains intact  Outcome: Progressing   Skin assessment completed. Patient turned every 2 hours and as needed. No skin breakdown this shift. Problem: Skin/Tissue Integrity - Adult  Goal: Incisions, wounds, or drain sites healing without S/S of infection  Outcome: Progressing     Problem: Gastrointestinal - Adult  Goal: Minimal or absence of nausea and vomiting  Outcome: Progressing     Problem: Gastrointestinal - Adult  Goal: Maintains or returns to baseline bowel function  Outcome: Progressing     Problem: Skin/Tissue Integrity  Goal: Absence of new skin breakdown  Description: 1. Monitor for areas of redness and/or skin breakdown  2. Assess vascular access sites hourly  3. Every 4-6 hours minimum:  Change oxygen saturation probe site  4. Every 4-6 hours:  If on nasal continuous positive airway pressure, respiratory therapy assess nares and determine need for appliance change or resting period. Outcome: Progressing       Care plan reviewed with patient. Patient verbalize understanding of the plan of care and contribute to goal setting.

## 2023-11-22 NOTE — CARE COORDINATION
11/22/23, 11:49 AM EST    DISCHARGE ON GOING EVALUATION    [facility] day: 5  [facility] Reason for admit: Diverticulitis [K57.92]  Acute diverticulitis [K57.92]   Procedure:   11/17 Robotic Sigmoid Colectomy   11/21 DIAGNOSTIC LAPAROSCOPY   Barriers to Discharge: N/G remains in to LIWS, IV fluids, IV Pepcid, Movantik scheduled, prn Roxicodone, Dilaudid, and Zofran, daily CBC, NPO, ambulate, drain care, incentive spirometry, SCD's, up in chair. PCP: Shin CANNON  Readmission Risk Score: 17.8%  Patient Goals/Plan/Treatment Preferences: Celi Colby is from home with his parents. No needs at discharge.

## 2023-11-23 LAB
ANION GAP SERPL CALC-SCNC: 11 MEQ/L (ref 8–16)
AUTO DIFF PNL BLD: ABNORMAL
BASOPHILS ABSOLUTE: 0 THOU/MM3 (ref 0–0.1)
BASOPHILS NFR BLD AUTO: 0.4 %
BUN SERPL-MCNC: 15 MG/DL (ref 7–22)
CALCIUM SERPL-MCNC: 8.2 MG/DL (ref 8.5–10.5)
CHLORIDE SERPL-SCNC: 108 MEQ/L (ref 98–111)
CO2 SERPL-SCNC: 23 MEQ/L (ref 23–33)
CREAT SERPL-MCNC: 0.8 MG/DL (ref 0.4–1.2)
DEPRECATED RDW RBC AUTO: 41.9 FL (ref 35–45)
EOSINOPHIL NFR BLD AUTO: 0 %
EOSINOPHILS ABSOLUTE: 0 THOU/MM3 (ref 0–0.4)
ERYTHROCYTE [DISTWIDTH] IN BLOOD BY AUTOMATED COUNT: 12.8 % (ref 11.5–14.5)
GFR SERPL CREATININE-BSD FRML MDRD: > 60 ML/MIN/1.73M2
GLUCOSE SERPL-MCNC: 102 MG/DL (ref 70–108)
HCT VFR BLD AUTO: 29.8 % (ref 42–52)
HGB BLD-MCNC: 10 GM/DL (ref 14–18)
IMM GRANULOCYTES # BLD AUTO: 0.06 THOU/MM3 (ref 0–0.07)
IMM GRANULOCYTES NFR BLD AUTO: 0.9 %
LYMPHOCYTES ABSOLUTE: 0.4 THOU/MM3 (ref 1–4.8)
LYMPHOCYTES NFR BLD AUTO: 6.3 %
MCH RBC QN AUTO: 29.9 PG (ref 26–33)
MCHC RBC AUTO-ENTMCNC: 33.6 GM/DL (ref 32.2–35.5)
MCV RBC AUTO: 89 FL (ref 80–94)
MONOCYTES ABSOLUTE: 0.7 THOU/MM3 (ref 0.4–1.3)
MONOCYTES NFR BLD AUTO: 9.9 %
NEUTROPHILS NFR BLD AUTO: 82.5 %
NRBC BLD AUTO-RTO: 0 /100 WBC
PATHOLOGIST REVIEW: ABNORMAL
PLATELET # BLD AUTO: 205 THOU/MM3 (ref 130–400)
PLATELET BLD QL SMEAR: ADEQUATE
PMV BLD AUTO: 12 FL (ref 9.4–12.4)
POTASSIUM SERPL-SCNC: 3.6 MEQ/L (ref 3.5–5.2)
RBC # BLD AUTO: 3.35 MILL/MM3 (ref 4.7–6.1)
SCAN OF BLOOD SMEAR: NORMAL
SEGMENTED NEUTROPHILS ABSOLUTE COUNT: 5.7 THOU/MM3 (ref 1.8–7.7)
SODIUM SERPL-SCNC: 142 MEQ/L (ref 135–145)
WBC # BLD AUTO: 6.9 THOU/MM3 (ref 4.8–10.8)

## 2023-11-23 PROCEDURE — 36415 COLL VENOUS BLD VENIPUNCTURE: CPT

## 2023-11-23 PROCEDURE — 85025 COMPLETE CBC W/AUTO DIFF WBC: CPT

## 2023-11-23 PROCEDURE — 6370000000 HC RX 637 (ALT 250 FOR IP): Performed by: SURGERY

## 2023-11-23 PROCEDURE — 2500000003 HC RX 250 WO HCPCS: Performed by: SURGERY

## 2023-11-23 PROCEDURE — 80048 BASIC METABOLIC PNL TOTAL CA: CPT

## 2023-11-23 PROCEDURE — 6360000002 HC RX W HCPCS: Performed by: SURGERY

## 2023-11-23 PROCEDURE — 1200000000 HC SEMI PRIVATE

## 2023-11-23 PROCEDURE — 2580000003 HC RX 258: Performed by: SURGERY

## 2023-11-23 PROCEDURE — A4216 STERILE WATER/SALINE, 10 ML: HCPCS | Performed by: SURGERY

## 2023-11-23 PROCEDURE — 99024 POSTOP FOLLOW-UP VISIT: CPT | Performed by: SURGERY

## 2023-11-23 PROCEDURE — 94761 N-INVAS EAR/PLS OXIMETRY MLT: CPT

## 2023-11-23 RX ORDER — METOCLOPRAMIDE HYDROCHLORIDE 5 MG/ML
10 INJECTION INTRAMUSCULAR; INTRAVENOUS EVERY 6 HOURS
Status: DISCONTINUED | OUTPATIENT
Start: 2023-11-23 | End: 2023-11-24

## 2023-11-23 RX ORDER — BISACODYL 10 MG
10 SUPPOSITORY, RECTAL RECTAL DAILY
Status: DISCONTINUED | OUTPATIENT
Start: 2023-11-23 | End: 2023-11-24

## 2023-11-23 RX ORDER — ACETAMINOPHEN 325 MG/1
650 TABLET ORAL EVERY 4 HOURS PRN
Status: DISCONTINUED | OUTPATIENT
Start: 2023-11-23 | End: 2023-11-25 | Stop reason: SDUPTHER

## 2023-11-23 RX ADMIN — HYDROMORPHONE HYDROCHLORIDE 0.5 MG: 1 INJECTION, SOLUTION INTRAMUSCULAR; INTRAVENOUS; SUBCUTANEOUS at 03:34

## 2023-11-23 RX ADMIN — HYDROMORPHONE HYDROCHLORIDE 0.5 MG: 1 INJECTION, SOLUTION INTRAMUSCULAR; INTRAVENOUS; SUBCUTANEOUS at 15:56

## 2023-11-23 RX ADMIN — SODIUM CHLORIDE: 9 INJECTION, SOLUTION INTRAVENOUS at 02:32

## 2023-11-23 RX ADMIN — ONDANSETRON 4 MG: 2 INJECTION INTRAMUSCULAR; INTRAVENOUS at 09:57

## 2023-11-23 RX ADMIN — FAMOTIDINE 20 MG: 10 INJECTION, SOLUTION INTRAVENOUS at 21:04

## 2023-11-23 RX ADMIN — SODIUM CHLORIDE, PRESERVATIVE FREE 10 ML: 5 INJECTION INTRAVENOUS at 08:18

## 2023-11-23 RX ADMIN — BISACODYL 10 MG: 10 SUPPOSITORY RECTAL at 11:18

## 2023-11-23 RX ADMIN — PHENOL 1 SPRAY: 1.5 LIQUID ORAL at 10:00

## 2023-11-23 RX ADMIN — METOPROLOL TARTRATE 5 MG: 5 INJECTION, SOLUTION INTRAVENOUS at 05:55

## 2023-11-23 RX ADMIN — ONDANSETRON 4 MG: 2 INJECTION INTRAMUSCULAR; INTRAVENOUS at 03:34

## 2023-11-23 RX ADMIN — HYDROMORPHONE HYDROCHLORIDE 0.5 MG: 1 INJECTION, SOLUTION INTRAMUSCULAR; INTRAVENOUS; SUBCUTANEOUS at 09:57

## 2023-11-23 RX ADMIN — ONDANSETRON 4 MG: 2 INJECTION INTRAMUSCULAR; INTRAVENOUS at 18:51

## 2023-11-23 RX ADMIN — PHENOL 1 SPRAY: 1.5 LIQUID ORAL at 00:24

## 2023-11-23 RX ADMIN — METOCLOPRAMIDE 10 MG: 5 INJECTION, SOLUTION INTRAMUSCULAR; INTRAVENOUS at 11:18

## 2023-11-23 RX ADMIN — METOPROLOL TARTRATE 5 MG: 5 INJECTION, SOLUTION INTRAVENOUS at 21:04

## 2023-11-23 RX ADMIN — FAMOTIDINE 20 MG: 10 INJECTION, SOLUTION INTRAVENOUS at 08:18

## 2023-11-23 RX ADMIN — HYDROMORPHONE HYDROCHLORIDE 0.5 MG: 1 INJECTION, SOLUTION INTRAMUSCULAR; INTRAVENOUS; SUBCUTANEOUS at 06:59

## 2023-11-23 RX ADMIN — HYDROMORPHONE HYDROCHLORIDE 0.5 MG: 1 INJECTION, SOLUTION INTRAMUSCULAR; INTRAVENOUS; SUBCUTANEOUS at 18:51

## 2023-11-23 RX ADMIN — PHENOL 1 SPRAY: 1.5 LIQUID ORAL at 08:00

## 2023-11-23 RX ADMIN — METOCLOPRAMIDE 10 MG: 5 INJECTION, SOLUTION INTRAMUSCULAR; INTRAVENOUS at 22:08

## 2023-11-23 RX ADMIN — PHENOL 1 SPRAY: 1.5 LIQUID ORAL at 14:00

## 2023-11-23 RX ADMIN — PHENOL 1 SPRAY: 1.5 LIQUID ORAL at 16:00

## 2023-11-23 RX ADMIN — METOCLOPRAMIDE 10 MG: 5 INJECTION, SOLUTION INTRAMUSCULAR; INTRAVENOUS at 17:19

## 2023-11-23 RX ADMIN — PHENOL 1 SPRAY: 1.5 LIQUID ORAL at 21:12

## 2023-11-23 RX ADMIN — HYDROMORPHONE HYDROCHLORIDE 0.5 MG: 1 INJECTION, SOLUTION INTRAMUSCULAR; INTRAVENOUS; SUBCUTANEOUS at 12:15

## 2023-11-23 RX ADMIN — PHENOL 1 SPRAY: 1.5 LIQUID ORAL at 06:00

## 2023-11-23 RX ADMIN — PHENOL 1 SPRAY: 1.5 LIQUID ORAL at 18:00

## 2023-11-23 RX ADMIN — HYDROMORPHONE HYDROCHLORIDE 0.5 MG: 1 INJECTION, SOLUTION INTRAMUSCULAR; INTRAVENOUS; SUBCUTANEOUS at 22:06

## 2023-11-23 RX ADMIN — PHENOL 1 SPRAY: 1.5 LIQUID ORAL at 12:00

## 2023-11-23 RX ADMIN — HYDROMORPHONE HYDROCHLORIDE 0.5 MG: 1 INJECTION, SOLUTION INTRAMUSCULAR; INTRAVENOUS; SUBCUTANEOUS at 00:24

## 2023-11-23 RX ADMIN — PHENOL 1 SPRAY: 1.5 LIQUID ORAL at 03:34

## 2023-11-23 ASSESSMENT — PAIN DESCRIPTION - ORIENTATION
ORIENTATION: RIGHT;LEFT;MID
ORIENTATION: MID
ORIENTATION: MID;RIGHT;LEFT
ORIENTATION: MID;RIGHT;LEFT
ORIENTATION: LEFT;RIGHT
ORIENTATION: MID
ORIENTATION: ANTERIOR;POSTERIOR
ORIENTATION: MID
ORIENTATION: RIGHT;LEFT;MID
ORIENTATION: RIGHT;LEFT
ORIENTATION: ANTERIOR;POSTERIOR

## 2023-11-23 ASSESSMENT — PAIN DESCRIPTION - DESCRIPTORS
DESCRIPTORS: STABBING;THROBBING
DESCRIPTORS: SORE
DESCRIPTORS: SORE
DESCRIPTORS: ACHING;DISCOMFORT
DESCRIPTORS: THROBBING
DESCRIPTORS: ACHING
DESCRIPTORS: THROBBING
DESCRIPTORS: SORE
DESCRIPTORS: STABBING;THROBBING

## 2023-11-23 ASSESSMENT — PAIN SCALES - GENERAL
PAINLEVEL_OUTOF10: 3
PAINLEVEL_OUTOF10: 8
PAINLEVEL_OUTOF10: 7
PAINLEVEL_OUTOF10: 9
PAINLEVEL_OUTOF10: 3
PAINLEVEL_OUTOF10: 4
PAINLEVEL_OUTOF10: 8
PAINLEVEL_OUTOF10: 9
PAINLEVEL_OUTOF10: 9
PAINLEVEL_OUTOF10: 8

## 2023-11-23 ASSESSMENT — PAIN DESCRIPTION - LOCATION
LOCATION: ABDOMEN
LOCATION: ABDOMEN
LOCATION: ABDOMEN;BACK
LOCATION: ABDOMEN
LOCATION: THROAT
LOCATION: ABDOMEN

## 2023-11-23 NOTE — FLOWSHEET NOTE
11/23/23 0545   Treatment Team Notification   Reason for Communication Abnormal vitals  (Temp-100.4)   Name of Team Member Notified Fariba Sharp   Treatment Team Role Physician Assistant   Method of Communication Secure Message   Response See orders   Notification Time 0037     Patient refused to take the oral tablet form of Acetaminophen to lower down temperature.

## 2023-11-23 NOTE — PLAN OF CARE
Problem: Discharge Planning  Goal: Discharge to home or other facility with appropriate resources  11/23/2023 0037 by Jaswinder Muñoz RN  Outcome: Progressing  Flowsheets (Taken 11/22/2023 2323)  Discharge to home or other facility with appropriate resources:   Identify barriers to discharge with patient and caregiver   Arrange for needed discharge resources and transportation as appropriate   Identify discharge learning needs (meds, wound care, etc)   Refer to discharge planning if patient needs post-hospital services based on physician order or complex needs related to functional status, cognitive ability or social support system   Discharge plan is home. Awaiting for further discharge instructions and goals  Problem: Pain  Goal: Verbalizes/displays adequate comfort level or baseline comfort level  11/23/2023 0037 by Jaswinder Muñoz RN  Outcome: Progressing  Flowsheets (Taken 11/22/2023 231)  Verbalizes/displays adequate comfort level or baseline comfort level:   Encourage patient to monitor pain and request assistance   Administer analgesics based on type and severity of pain and evaluate response   Assess pain using appropriate pain scale   Implement non-pharmacological measures as appropriate and evaluate response   Notify Licensed Independent Practitioner if interventions unsuccessful or patient reports new pain     Problem: ABCDS Injury Assessment  Goal: Absence of physical injury  11/23/2023 0037 by Jaswinder Muñoz RN  Outcome: Progressing  Patient using call light appropriately to call for assistance with ambulation to bathroom. Personal items within reach. Patient is also compliant with use of non-skid slippers. Problem: Safety - Adult  Goal: Free from fall injury  11/23/2023 0037 by Jaswinder Muñoz RN  Outcome: Progressing    Fall assessment completed. Patient using call light appropriately to call for assistance with ambulation to bathroom. Personal items within reach.  Patient improved neurological status:   Assess for and report changes in neurological status   Initiate measures to prevent increased intracranial pressure   Monitor temperature, glucose, and sodium.  Initiate appropriate interventions as ordered   Maintain blood pressure and fluid volume within ordered parameters to optimize cerebral perfusion and minimize risk of hemorrhage     Problem: Respiratory - Adult  Goal: Achieves optimal ventilation and oxygenation  Outcome: Progressing  Flowsheets (Taken 11/22/2023 2323)  Achieves optimal ventilation and oxygenation:   Assess for changes in respiratory status   Assess for changes in mentation and behavior   Position to facilitate oxygenation and minimize respiratory effort   Oxygen supplementation based on oxygen saturation or arterial blood gases   Initiate smoking cessation protocol as indicated   Assess the need for suctioning and aspirate as needed   Encourage broncho-pulmonary hygiene including cough, deep breathe, incentive spirometry   Assess and instruct to report shortness of breath or any respiratory difficulty   Respiratory therapy support as indicated     Problem: Cardiovascular - Adult  Goal: Maintains optimal cardiac output and hemodynamic stability  Outcome: Progressing  Flowsheets (Taken 11/22/2023 2323)  Maintains optimal cardiac output and hemodynamic stability:   Monitor blood pressure and heart rate   Monitor urine output and notify Licensed Independent Practitioner for values outside of normal range   Assess for signs of decreased cardiac output     Problem: Musculoskeletal - Adult  Goal: Return mobility to safest level of function  Outcome: Progressing  Flowsheets (Taken 11/22/2023 2323)  Return Mobility to Safest Level of Function:   Assess patient stability and activity tolerance for standing, transferring and ambulating with or without assistive devices   Assist with transfers and ambulation using safe patient handling equipment as needed   Ensure

## 2023-11-23 NOTE — PROGRESS NOTES
NELY drain has very foul and strong odor. Odor smells like stool. Drainage is thick redish brown. Nely drain site is swollen and red. Small amount of pink tinged purulent drainage is on old dressing. Site cleansed with CHG soap. New split dressing applied. Page sent to Dr. Celi Grady at [phone]. Spoke to Dr. Celi Grady on phone. Keep patient NPO. Morning labs ordered.

## 2023-11-23 NOTE — PLAN OF CARE
Problem: Discharge Planning  Goal: Discharge to home or other facility with appropriate resources  11/23/2023 0912 by Cora Loaiza RN  Outcome: Progressing  Flowsheets (Taken 11/23/2023 0912)  Discharge to home or other facility with appropriate resources:   Identify barriers to discharge with patient and caregiver   Arrange for needed discharge resources and transportation as appropriate   Identify discharge learning needs (meds, wound care, etc)  Note: From home with family. Problem: Pain  Goal: Verbalizes/displays adequate comfort level or baseline comfort level  11/23/2023 0912 by Cora Loaiza RN  Outcome: Progressing  Flowsheets (Taken 11/23/2023 0912)  Verbalizes/displays adequate comfort level or baseline comfort level:   Encourage patient to monitor pain and request assistance   Assess pain using appropriate pain scale   Administer analgesics based on type and severity of pain and evaluate response   Implement non-pharmacological measures as appropriate and evaluate response  Note: PRN Dilaudid. Problem: ABCDS Injury Assessment  Goal: Absence of physical injury  11/23/2023 0912 by Cora Loaiza RN  Outcome: Progressing  Flowsheets (Taken 11/23/2023 0912)  Absence of Physical Injury: Implement safety measures based on patient assessment     Problem: Safety - Adult  Goal: Free from fall injury  11/23/2023 0912 by Cora Loaiza RN  Outcome: Progressing  Flowsheets (Taken 11/23/2023 0912)  Free From Fall Injury:   Instruct family/caregiver on patient safety   Based on caregiver fall risk screen, instruct family/caregiver to ask for assistance with transferring infant if caregiver noted to have fall risk factors  Note: Standby assist after set up. Steady gait.       Problem: Skin/Tissue Integrity - Adult  Goal: Skin integrity remains intact  11/23/2023 0912 by Cora Loaiza RN  Outcome: Progressing  Flowsheets (Taken 11/23/2023 0912)  Skin Integrity Remains Intact:   Monitor for

## 2023-11-24 ENCOUNTER — ANESTHESIA (OUTPATIENT)
Dept: OPERATING ROOM | Age: 28
End: 2023-11-24
Payer: COMMERCIAL

## 2023-11-24 ENCOUNTER — APPOINTMENT (OUTPATIENT)
Dept: CT IMAGING | Age: 28
DRG: 231 | End: 2023-11-24
Attending: SURGERY
Payer: COMMERCIAL

## 2023-11-24 ENCOUNTER — ANESTHESIA EVENT (OUTPATIENT)
Dept: OPERATING ROOM | Age: 28
End: 2023-11-24
Payer: COMMERCIAL

## 2023-11-24 ENCOUNTER — APPOINTMENT (OUTPATIENT)
Dept: GENERAL RADIOLOGY | Age: 28
DRG: 231 | End: 2023-11-24
Attending: SURGERY
Payer: COMMERCIAL

## 2023-11-24 PROBLEM — K91.89 LARGE BOWEL ANASTOMOTIC LEAK: Status: ACTIVE | Noted: 2023-11-24

## 2023-11-24 LAB
ANION GAP SERPL CALC-SCNC: 12 MEQ/L (ref 8–16)
BASOPHILS ABSOLUTE: 0 THOU/MM3 (ref 0–0.1)
BASOPHILS NFR BLD AUTO: 0.5 %
BUN SERPL-MCNC: 15 MG/DL (ref 7–22)
CALCIUM SERPL-MCNC: 8.4 MG/DL (ref 8.5–10.5)
CHLORIDE SERPL-SCNC: 107 MEQ/L (ref 98–111)
CO2 SERPL-SCNC: 22 MEQ/L (ref 23–33)
CREAT SERPL-MCNC: 0.7 MG/DL (ref 0.4–1.2)
DEPRECATED RDW RBC AUTO: 41.4 FL (ref 35–45)
EOSINOPHIL NFR BLD AUTO: 0.3 %
EOSINOPHILS ABSOLUTE: 0 THOU/MM3 (ref 0–0.4)
ERYTHROCYTE [DISTWIDTH] IN BLOOD BY AUTOMATED COUNT: 12.8 % (ref 11.5–14.5)
GFR SERPL CREATININE-BSD FRML MDRD: > 60 ML/MIN/1.73M2
GLUCOSE SERPL-MCNC: 99 MG/DL (ref 70–108)
HCT VFR BLD AUTO: 27.7 % (ref 42–52)
HGB BLD-MCNC: 9.4 GM/DL (ref 14–18)
IMM GRANULOCYTES # BLD AUTO: 0.12 THOU/MM3 (ref 0–0.07)
IMM GRANULOCYTES NFR BLD AUTO: 1.6 %
LACTIC ACID, SEPSIS: 1.3 MMOL/L (ref 0.5–1.9)
LYMPHOCYTES ABSOLUTE: 0.6 THOU/MM3 (ref 1–4.8)
LYMPHOCYTES NFR BLD AUTO: 8.5 %
MCH RBC QN AUTO: 29.7 PG (ref 26–33)
MCHC RBC AUTO-ENTMCNC: 33.9 GM/DL (ref 32.2–35.5)
MCV RBC AUTO: 87.7 FL (ref 80–94)
MONOCYTES ABSOLUTE: 0.9 THOU/MM3 (ref 0.4–1.3)
MONOCYTES NFR BLD AUTO: 11.2 %
NEUTROPHILS NFR BLD AUTO: 77.9 %
NRBC BLD AUTO-RTO: 0 /100 WBC
PLATELET # BLD AUTO: 228 THOU/MM3 (ref 130–400)
PMV BLD AUTO: 11.3 FL (ref 9.4–12.4)
POTASSIUM SERPL-SCNC: 3.4 MEQ/L (ref 3.5–5.2)
RBC # BLD AUTO: 3.16 MILL/MM3 (ref 4.7–6.1)
SEGMENTED NEUTROPHILS ABSOLUTE COUNT: 5.9 THOU/MM3 (ref 1.8–7.7)
SODIUM SERPL-SCNC: 141 MEQ/L (ref 135–145)
WBC # BLD AUTO: 7.6 THOU/MM3 (ref 4.8–10.8)

## 2023-11-24 PROCEDURE — 99024 POSTOP FOLLOW-UP VISIT: CPT | Performed by: NURSE PRACTITIONER

## 2023-11-24 PROCEDURE — 44146 PARTIAL REMOVAL OF COLON: CPT | Performed by: SURGERY

## 2023-11-24 PROCEDURE — 6360000002 HC RX W HCPCS: Performed by: SURGERY

## 2023-11-24 PROCEDURE — 2500000003 HC RX 250 WO HCPCS: Performed by: NURSE PRACTITIONER

## 2023-11-24 PROCEDURE — 2500000003 HC RX 250 WO HCPCS: Performed by: NURSE ANESTHETIST, CERTIFIED REGISTERED

## 2023-11-24 PROCEDURE — 7100000000 HC PACU RECOVERY - FIRST 15 MIN: Performed by: SURGERY

## 2023-11-24 PROCEDURE — 1200000000 HC SEMI PRIVATE

## 2023-11-24 PROCEDURE — 3700000000 HC ANESTHESIA ATTENDED CARE: Performed by: SURGERY

## 2023-11-24 PROCEDURE — 2500000003 HC RX 250 WO HCPCS: Performed by: ANESTHESIOLOGY

## 2023-11-24 PROCEDURE — 2500000003 HC RX 250 WO HCPCS: Performed by: SURGERY

## 2023-11-24 PROCEDURE — 2709999900 HC NON-CHARGEABLE SUPPLY: Performed by: SURGERY

## 2023-11-24 PROCEDURE — 0D1N0Z4 BYPASS SIGMOID COLON TO CUTANEOUS, OPEN APPROACH: ICD-10-PCS | Performed by: SURGERY

## 2023-11-24 PROCEDURE — 6370000000 HC RX 637 (ALT 250 FOR IP): Performed by: PHYSICIAN ASSISTANT

## 2023-11-24 PROCEDURE — 2580000003 HC RX 258: Performed by: ANESTHESIOLOGY

## 2023-11-24 PROCEDURE — 3600000014 HC SURGERY LEVEL 4 ADDTL 15MIN: Performed by: SURGERY

## 2023-11-24 PROCEDURE — 2580000003 HC RX 258: Performed by: SURGERY

## 2023-11-24 PROCEDURE — 6360000004 HC RX CONTRAST MEDICATION: Performed by: NURSE PRACTITIONER

## 2023-11-24 PROCEDURE — 74177 CT ABD & PELVIS W/CONTRAST: CPT

## 2023-11-24 PROCEDURE — 83605 ASSAY OF LACTIC ACID: CPT

## 2023-11-24 PROCEDURE — A4216 STERILE WATER/SALINE, 10 ML: HCPCS | Performed by: SURGERY

## 2023-11-24 PROCEDURE — 3600000004 HC SURGERY LEVEL 4 BASE: Performed by: SURGERY

## 2023-11-24 PROCEDURE — 85025 COMPLETE CBC W/AUTO DIFF WBC: CPT

## 2023-11-24 PROCEDURE — 87040 BLOOD CULTURE FOR BACTERIA: CPT

## 2023-11-24 PROCEDURE — APPSS60 APP SPLIT SHARED TIME 46-60 MINUTES: Performed by: NURSE PRACTITIONER

## 2023-11-24 PROCEDURE — 6360000002 HC RX W HCPCS: Performed by: NURSE PRACTITIONER

## 2023-11-24 PROCEDURE — 6370000000 HC RX 637 (ALT 250 FOR IP): Performed by: SURGERY

## 2023-11-24 PROCEDURE — 6360000002 HC RX W HCPCS: Performed by: NURSE ANESTHETIST, CERTIFIED REGISTERED

## 2023-11-24 PROCEDURE — 80048 BASIC METABOLIC PNL TOTAL CA: CPT

## 2023-11-24 PROCEDURE — 2720000010 HC SURG SUPPLY STERILE: Performed by: SURGERY

## 2023-11-24 PROCEDURE — 3700000001 HC ADD 15 MINUTES (ANESTHESIA): Performed by: SURGERY

## 2023-11-24 PROCEDURE — 6360000002 HC RX W HCPCS: Performed by: ANESTHESIOLOGY

## 2023-11-24 PROCEDURE — 7100000001 HC PACU RECOVERY - ADDTL 15 MIN: Performed by: SURGERY

## 2023-11-24 PROCEDURE — 6360000002 HC RX W HCPCS

## 2023-11-24 PROCEDURE — 36415 COLL VENOUS BLD VENIPUNCTURE: CPT

## 2023-11-24 PROCEDURE — 2580000003 HC RX 258: Performed by: NURSE PRACTITIONER

## 2023-11-24 PROCEDURE — 71045 X-RAY EXAM CHEST 1 VIEW: CPT

## 2023-11-24 RX ORDER — FENTANYL CITRATE 50 UG/ML
50 INJECTION, SOLUTION INTRAMUSCULAR; INTRAVENOUS
Status: DISCONTINUED | OUTPATIENT
Start: 2023-11-24 | End: 2023-11-24

## 2023-11-24 RX ORDER — FENTANYL CITRATE 50 UG/ML
50 INJECTION, SOLUTION INTRAMUSCULAR; INTRAVENOUS EVERY 5 MIN PRN
Status: DISCONTINUED | OUTPATIENT
Start: 2023-11-24 | End: 2023-11-24 | Stop reason: HOSPADM

## 2023-11-24 RX ORDER — LORAZEPAM 2 MG/ML
0.5 INJECTION INTRAMUSCULAR EVERY 6 HOURS PRN
Status: DISCONTINUED | OUTPATIENT
Start: 2023-11-24 | End: 2023-12-02

## 2023-11-24 RX ORDER — DROPERIDOL 2.5 MG/ML
0.62 INJECTION, SOLUTION INTRAMUSCULAR; INTRAVENOUS ONCE
Status: COMPLETED | OUTPATIENT
Start: 2023-11-24 | End: 2023-11-24

## 2023-11-24 RX ORDER — LIDOCAINE HYDROCHLORIDE 20 MG/ML
INJECTION, SOLUTION INFILTRATION; PERINEURAL PRN
Status: DISCONTINUED | OUTPATIENT
Start: 2023-11-24 | End: 2023-11-24 | Stop reason: SDUPTHER

## 2023-11-24 RX ORDER — FENTANYL CITRATE 50 UG/ML
INJECTION, SOLUTION INTRAMUSCULAR; INTRAVENOUS
Status: COMPLETED
Start: 2023-11-24 | End: 2023-11-24

## 2023-11-24 RX ORDER — ONDANSETRON 2 MG/ML
INJECTION INTRAMUSCULAR; INTRAVENOUS PRN
Status: DISCONTINUED | OUTPATIENT
Start: 2023-11-24 | End: 2023-11-24 | Stop reason: SDUPTHER

## 2023-11-24 RX ORDER — DEXAMETHASONE SODIUM PHOSPHATE 10 MG/ML
INJECTION, EMULSION INTRAMUSCULAR; INTRAVENOUS PRN
Status: DISCONTINUED | OUTPATIENT
Start: 2023-11-24 | End: 2023-11-24 | Stop reason: SDUPTHER

## 2023-11-24 RX ORDER — SODIUM CHLORIDE 9 MG/ML
INJECTION, SOLUTION INTRAVENOUS ONCE
Status: COMPLETED | OUTPATIENT
Start: 2023-11-24 | End: 2023-11-24

## 2023-11-24 RX ORDER — KETAMINE HCL IN NACL, ISO-OSM 100MG/10ML
SYRINGE (ML) INJECTION PRN
Status: DISCONTINUED | OUTPATIENT
Start: 2023-11-24 | End: 2023-11-24 | Stop reason: SDUPTHER

## 2023-11-24 RX ORDER — FENTANYL CITRATE 50 UG/ML
INJECTION, SOLUTION INTRAMUSCULAR; INTRAVENOUS PRN
Status: DISCONTINUED | OUTPATIENT
Start: 2023-11-24 | End: 2023-11-24 | Stop reason: SDUPTHER

## 2023-11-24 RX ORDER — MIDAZOLAM HYDROCHLORIDE 1 MG/ML
INJECTION INTRAMUSCULAR; INTRAVENOUS PRN
Status: DISCONTINUED | OUTPATIENT
Start: 2023-11-24 | End: 2023-11-24 | Stop reason: SDUPTHER

## 2023-11-24 RX ORDER — SUCCINYLCHOLINE CHLORIDE 20 MG/ML
INJECTION INTRAMUSCULAR; INTRAVENOUS PRN
Status: DISCONTINUED | OUTPATIENT
Start: 2023-11-24 | End: 2023-11-24 | Stop reason: SDUPTHER

## 2023-11-24 RX ORDER — ROCURONIUM BROMIDE 10 MG/ML
INJECTION, SOLUTION INTRAVENOUS PRN
Status: DISCONTINUED | OUTPATIENT
Start: 2023-11-24 | End: 2023-11-24 | Stop reason: SDUPTHER

## 2023-11-24 RX ORDER — METOPROLOL TARTRATE 1 MG/ML
5 INJECTION, SOLUTION INTRAVENOUS
Status: COMPLETED | OUTPATIENT
Start: 2023-11-24 | End: 2023-11-24

## 2023-11-24 RX ORDER — DROPERIDOL 2.5 MG/ML
INJECTION, SOLUTION INTRAMUSCULAR; INTRAVENOUS
Status: COMPLETED
Start: 2023-11-24 | End: 2023-11-24

## 2023-11-24 RX ORDER — PROPOFOL 10 MG/ML
INJECTION, EMULSION INTRAVENOUS PRN
Status: DISCONTINUED | OUTPATIENT
Start: 2023-11-24 | End: 2023-11-24 | Stop reason: SDUPTHER

## 2023-11-24 RX ORDER — HYDROMORPHONE HYDROCHLORIDE 2 MG/ML
INJECTION, SOLUTION INTRAMUSCULAR; INTRAVENOUS; SUBCUTANEOUS PRN
Status: DISCONTINUED | OUTPATIENT
Start: 2023-11-24 | End: 2023-11-24 | Stop reason: SDUPTHER

## 2023-11-24 RX ADMIN — HYDROMORPHONE HYDROCHLORIDE 0.5 MG: 1 INJECTION, SOLUTION INTRAMUSCULAR; INTRAVENOUS; SUBCUTANEOUS at 06:51

## 2023-11-24 RX ADMIN — HYDROMORPHONE HYDROCHLORIDE 1 MG: 2 INJECTION INTRAMUSCULAR; INTRAVENOUS; SUBCUTANEOUS at 15:50

## 2023-11-24 RX ADMIN — FENTANYL CITRATE 50 MCG: 50 INJECTION INTRAMUSCULAR; INTRAVENOUS at 17:48

## 2023-11-24 RX ADMIN — METOPROLOL TARTRATE 5 MG: 5 INJECTION INTRAVENOUS at 19:18

## 2023-11-24 RX ADMIN — HYDROMORPHONE HYDROCHLORIDE 0.5 MG: 1 INJECTION, SOLUTION INTRAMUSCULAR; INTRAVENOUS; SUBCUTANEOUS at 17:53

## 2023-11-24 RX ADMIN — ACETAMINOPHEN 650 MG: 325 TABLET ORAL at 03:58

## 2023-11-24 RX ADMIN — PIPERACILLIN AND TAZOBACTAM 4500 MG: 4; .5 INJECTION, POWDER, FOR SOLUTION INTRAVENOUS at 13:04

## 2023-11-24 RX ADMIN — HYDROMORPHONE HYDROCHLORIDE 0.5 MG: 1 INJECTION, SOLUTION INTRAMUSCULAR; INTRAVENOUS; SUBCUTANEOUS at 04:00

## 2023-11-24 RX ADMIN — FAMOTIDINE 20 MG: 10 INJECTION, SOLUTION INTRAVENOUS at 21:18

## 2023-11-24 RX ADMIN — METOCLOPRAMIDE 10 MG: 5 INJECTION, SOLUTION INTRAMUSCULAR; INTRAVENOUS at 04:11

## 2023-11-24 RX ADMIN — HYDROMORPHONE HYDROCHLORIDE 0.5 MG: 1 INJECTION, SOLUTION INTRAMUSCULAR; INTRAVENOUS; SUBCUTANEOUS at 00:54

## 2023-11-24 RX ADMIN — SODIUM CHLORIDE: 9 INJECTION, SOLUTION INTRAVENOUS at 00:13

## 2023-11-24 RX ADMIN — FENTANYL CITRATE 50 MCG: 50 INJECTION INTRAMUSCULAR; INTRAVENOUS at 17:58

## 2023-11-24 RX ADMIN — ROCURONIUM BROMIDE 10 MG: 10 INJECTION INTRAVENOUS at 15:50

## 2023-11-24 RX ADMIN — HYDROMORPHONE HYDROCHLORIDE 0.5 MG: 1 INJECTION, SOLUTION INTRAMUSCULAR; INTRAVENOUS; SUBCUTANEOUS at 09:51

## 2023-11-24 RX ADMIN — HYDROMORPHONE HYDROCHLORIDE 1 MG: 1 INJECTION, SOLUTION INTRAMUSCULAR; INTRAVENOUS; SUBCUTANEOUS at 13:01

## 2023-11-24 RX ADMIN — SODIUM CHLORIDE: 9 INJECTION, SOLUTION INTRAVENOUS at 15:21

## 2023-11-24 RX ADMIN — PHENOL 1 SPRAY: 1.5 LIQUID ORAL at 07:39

## 2023-11-24 RX ADMIN — TRAZODONE HYDROCHLORIDE 150 MG: 50 TABLET ORAL at 21:17

## 2023-11-24 RX ADMIN — SUGAMMADEX 200 MG: 100 INJECTION, SOLUTION INTRAVENOUS at 17:24

## 2023-11-24 RX ADMIN — HYDROMORPHONE HYDROCHLORIDE 0.5 MG: 1 INJECTION, SOLUTION INTRAMUSCULAR; INTRAVENOUS; SUBCUTANEOUS at 18:23

## 2023-11-24 RX ADMIN — FENTANYL CITRATE 50 MCG: 50 INJECTION INTRAMUSCULAR; INTRAVENOUS at 18:08

## 2023-11-24 RX ADMIN — LORAZEPAM 0.5 MG: 2 INJECTION INTRAMUSCULAR; INTRAVENOUS at 20:51

## 2023-11-24 RX ADMIN — PROPOFOL 200 MG: 10 INJECTION, EMULSION INTRAVENOUS at 15:24

## 2023-11-24 RX ADMIN — HYDROMORPHONE HYDROCHLORIDE 0.5 MG: 1 INJECTION, SOLUTION INTRAMUSCULAR; INTRAVENOUS; SUBCUTANEOUS at 18:13

## 2023-11-24 RX ADMIN — Medication 160 MG: at 15:24

## 2023-11-24 RX ADMIN — PROPRANOLOL HYDROCHLORIDE 10 MG: 10 TABLET ORAL at 21:17

## 2023-11-24 RX ADMIN — SODIUM CHLORIDE: 9 INJECTION, SOLUTION INTRAVENOUS at 07:39

## 2023-11-24 RX ADMIN — RISPERIDONE 0.5 MG: 0.25 TABLET, FILM COATED ORAL at 21:17

## 2023-11-24 RX ADMIN — ONDANSETRON 4 MG: 2 INJECTION INTRAMUSCULAR; INTRAVENOUS at 06:51

## 2023-11-24 RX ADMIN — ROCURONIUM BROMIDE 10 MG: 10 INJECTION INTRAVENOUS at 16:32

## 2023-11-24 RX ADMIN — ROCURONIUM BROMIDE 50 MG: 10 INJECTION INTRAVENOUS at 15:31

## 2023-11-24 RX ADMIN — FENTANYL CITRATE 100 MCG: 50 INJECTION, SOLUTION INTRAMUSCULAR; INTRAVENOUS at 16:38

## 2023-11-24 RX ADMIN — ONDANSETRON 4 MG: 2 INJECTION INTRAMUSCULAR; INTRAVENOUS at 15:41

## 2023-11-24 RX ADMIN — FENTANYL CITRATE 100 MCG: 50 INJECTION, SOLUTION INTRAMUSCULAR; INTRAVENOUS at 15:24

## 2023-11-24 RX ADMIN — Medication 50 MG: at 15:26

## 2023-11-24 RX ADMIN — FENTANYL CITRATE 50 MCG: 50 INJECTION INTRAMUSCULAR; INTRAVENOUS at 18:18

## 2023-11-24 RX ADMIN — ROCURONIUM BROMIDE 10 MG: 10 INJECTION INTRAVENOUS at 16:13

## 2023-11-24 RX ADMIN — SODIUM CHLORIDE, PRESERVATIVE FREE 10 ML: 5 INJECTION INTRAVENOUS at 21:19

## 2023-11-24 RX ADMIN — PHENOL 1 SPRAY: 1.5 LIQUID ORAL at 00:59

## 2023-11-24 RX ADMIN — DEXAMETHASONE SODIUM PHOSPHATE 10 MG: 10 INJECTION, EMULSION INTRAMUSCULAR; INTRAVENOUS at 15:41

## 2023-11-24 RX ADMIN — ONDANSETRON 4 MG: 2 INJECTION INTRAMUSCULAR; INTRAVENOUS at 00:54

## 2023-11-24 RX ADMIN — SODIUM CHLORIDE: 9 INJECTION, SOLUTION INTRAVENOUS at 18:40

## 2023-11-24 RX ADMIN — LIDOCAINE HYDROCHLORIDE 100 MG: 20 INJECTION, SOLUTION INFILTRATION; PERINEURAL at 15:24

## 2023-11-24 RX ADMIN — FAMOTIDINE 20 MG: 10 INJECTION, SOLUTION INTRAVENOUS at 08:33

## 2023-11-24 RX ADMIN — HYDROMORPHONE HYDROCHLORIDE 1 MG: 2 INJECTION INTRAMUSCULAR; INTRAVENOUS; SUBCUTANEOUS at 15:54

## 2023-11-24 RX ADMIN — HYDROMORPHONE HYDROCHLORIDE 1 MG: 1 INJECTION, SOLUTION INTRAMUSCULAR; INTRAVENOUS; SUBCUTANEOUS at 22:53

## 2023-11-24 RX ADMIN — ROCURONIUM BROMIDE 20 MG: 10 INJECTION INTRAVENOUS at 15:44

## 2023-11-24 RX ADMIN — IOPAMIDOL 80 ML: 755 INJECTION, SOLUTION INTRAVENOUS at 10:07

## 2023-11-24 RX ADMIN — HYDROMORPHONE HYDROCHLORIDE 1 MG: 1 INJECTION, SOLUTION INTRAMUSCULAR; INTRAVENOUS; SUBCUTANEOUS at 20:25

## 2023-11-24 RX ADMIN — HYDROMORPHONE HYDROCHLORIDE 0.5 MG: 1 INJECTION, SOLUTION INTRAMUSCULAR; INTRAVENOUS; SUBCUTANEOUS at 18:03

## 2023-11-24 RX ADMIN — DROPERIDOL 0.62 MG: 2.5 INJECTION, SOLUTION INTRAMUSCULAR; INTRAVENOUS at 17:59

## 2023-11-24 RX ADMIN — METOCLOPRAMIDE 10 MG: 5 INJECTION, SOLUTION INTRAMUSCULAR; INTRAVENOUS at 11:54

## 2023-11-24 RX ADMIN — VENLAFAXINE HYDROCHLORIDE 150 MG: 150 CAPSULE, EXTENDED RELEASE ORAL at 21:18

## 2023-11-24 RX ADMIN — PIPERACILLIN SODIUM AND TAZOBACTAM SODIUM 3375 MG: 3; .375 INJECTION, POWDER, LYOPHILIZED, FOR SOLUTION INTRAVENOUS at 21:24

## 2023-11-24 RX ADMIN — PHENOL 1 SPRAY: 1.5 LIQUID ORAL at 06:53

## 2023-11-24 RX ADMIN — METOPROLOL TARTRATE 5 MG: 5 INJECTION, SOLUTION INTRAVENOUS at 03:59

## 2023-11-24 RX ADMIN — MIDAZOLAM 2 MG: 1 INJECTION INTRAMUSCULAR; INTRAVENOUS at 15:20

## 2023-11-24 RX ADMIN — PHENOL 1 SPRAY: 1.5 LIQUID ORAL at 03:21

## 2023-11-24 RX ADMIN — BUSPIRONE HYDROCHLORIDE 10 MG: 10 TABLET ORAL at 21:17

## 2023-11-24 RX ADMIN — HYDROMORPHONE HYDROCHLORIDE 1 MG: 1 INJECTION, SOLUTION INTRAMUSCULAR; INTRAVENOUS; SUBCUTANEOUS at 14:52

## 2023-11-24 RX ADMIN — LORAZEPAM 0.5 MG: 2 INJECTION INTRAMUSCULAR; INTRAVENOUS at 13:01

## 2023-11-24 RX ADMIN — SODIUM CHLORIDE: 9 INJECTION, SOLUTION INTRAVENOUS at 17:24

## 2023-11-24 ASSESSMENT — PAIN SCALES - GENERAL
PAINLEVEL_OUTOF10: 2
PAINLEVEL_OUTOF10: 9
PAINLEVEL_OUTOF10: 10
PAINLEVEL_OUTOF10: 9
PAINLEVEL_OUTOF10: 10
PAINLEVEL_OUTOF10: 10
PAINLEVEL_OUTOF10: 8
PAINLEVEL_OUTOF10: 9
PAINLEVEL_OUTOF10: 8
PAINLEVEL_OUTOF10: 8
PAINLEVEL_OUTOF10: 10
PAINLEVEL_OUTOF10: 8
PAINLEVEL_OUTOF10: 9
PAINLEVEL_OUTOF10: 10
PAINLEVEL_OUTOF10: 10
PAINLEVEL_OUTOF10: 9
PAINLEVEL_OUTOF10: 10
PAINLEVEL_OUTOF10: 8
PAINLEVEL_OUTOF10: 10
PAINLEVEL_OUTOF10: 10
PAINLEVEL_OUTOF10: 8
PAINLEVEL_OUTOF10: 8
PAINLEVEL_OUTOF10: 3
PAINLEVEL_OUTOF10: 9
PAINLEVEL_OUTOF10: 8
PAINLEVEL_OUTOF10: 10

## 2023-11-24 ASSESSMENT — PAIN - FUNCTIONAL ASSESSMENT
PAIN_FUNCTIONAL_ASSESSMENT: PREVENTS OR INTERFERES SOME ACTIVE ACTIVITIES AND ADLS
PAIN_FUNCTIONAL_ASSESSMENT: ACTIVITIES ARE NOT PREVENTED
PAIN_FUNCTIONAL_ASSESSMENT: PREVENTS OR INTERFERES SOME ACTIVE ACTIVITIES AND ADLS

## 2023-11-24 ASSESSMENT — PAIN DESCRIPTION - FREQUENCY
FREQUENCY: CONTINUOUS
FREQUENCY: CONTINUOUS

## 2023-11-24 ASSESSMENT — PAIN DESCRIPTION - DESCRIPTORS
DESCRIPTORS: THROBBING
DESCRIPTORS: SHARP;STABBING;BURNING
DESCRIPTORS: SHARP
DESCRIPTORS: SORE
DESCRIPTORS: THROBBING
DESCRIPTORS: ACHING
DESCRIPTORS: SORE;THROBBING
DESCRIPTORS: STABBING
DESCRIPTORS: SORE;STABBING
DESCRIPTORS: DISCOMFORT;STABBING
DESCRIPTORS: SORE

## 2023-11-24 ASSESSMENT — PAIN DESCRIPTION - LOCATION
LOCATION: ABDOMEN
LOCATION: THROAT
LOCATION: ABDOMEN
LOCATION: ABDOMEN
LOCATION: THROAT
LOCATION: ABDOMEN
LOCATION: ABDOMEN
LOCATION: THROAT;ABDOMEN
LOCATION: ABDOMEN
LOCATION: ABDOMEN

## 2023-11-24 ASSESSMENT — PAIN DESCRIPTION - ORIENTATION
ORIENTATION: MID
ORIENTATION: MID;LOWER
ORIENTATION: MID;LOWER;RIGHT
ORIENTATION: RIGHT;LEFT;MID
ORIENTATION: RIGHT;LEFT;MID
ORIENTATION: MID
ORIENTATION: RIGHT;MID;LOWER
ORIENTATION: MID
ORIENTATION: MID
ORIENTATION: RIGHT;LEFT;MID
ORIENTATION: MID;LOWER
ORIENTATION: MID
ORIENTATION: MID;LOWER

## 2023-11-24 ASSESSMENT — PAIN DESCRIPTION - ONSET
ONSET: ON-GOING
ONSET: ON-GOING

## 2023-11-24 ASSESSMENT — PAIN DESCRIPTION - PAIN TYPE
TYPE: SURGICAL PAIN
TYPE: SURGICAL PAIN

## 2023-11-24 NOTE — PLAN OF CARE
Problem: Discharge Planning  Goal: Discharge to home or other facility with appropriate resources  Outcome: Progressing  Flowsheets (Taken 11/23/2023 2332)  Discharge to home or other facility with appropriate resources:   Identify barriers to discharge with patient and caregiver   Arrange for needed discharge resources and transportation as appropriate   Identify discharge learning needs (meds, wound care, etc)   Refer to discharge planning if patient needs post-hospital services based on physician order or complex needs related to functional status, cognitive ability or social support system   Discharge plan is home with his parents. Awaiting for further discharge plans and goals. Problem: Pain  Goal: Verbalizes/displays adequate comfort level or baseline comfort level  Outcome: Progressing  Flowsheets (Taken 11/23/2023 2330)  Verbalizes/displays adequate comfort level or baseline comfort level:   Encourage patient to monitor pain and request assistance   Assess pain using appropriate pain scale   Administer analgesics based on type and severity of pain and evaluate response   Implement non-pharmacological measures as appropriate and evaluate response   Notify Licensed Independent Practitioner if interventions unsuccessful or patient reports new pain     Problem: ABCDS Injury Assessment  Goal: Absence of physical injury  Outcome: Progressing   Patient using call light appropriately to call for assistance with ambulation to bathroom. Personal items within reach. Patient is also compliant with use of non-skid slippers. Problem: Safety - Adult  Goal: Free from fall injury  Outcome: Progressing    Fall assessment completed. Patient using call light appropriately to call for assistance with ambulation to bathroom. Personal items within reach. Patient is also compliant with use of non-skid slippers.       Problem: Skin/Tissue Integrity - Adult  Goal: Skin integrity remains intact  Outcome: appropriate     Problem: Hematologic - Adult  Goal: Maintains hematologic stability  Outcome: Progressing  Flowsheets (Taken 11/23/2023 2332)  Maintains hematologic stability: Assess for signs and symptoms of bleeding or hemorrhage     Problem: Coping  Goal: Pt/Family able to verbalize concerns and demonstrate effective coping strategies  Description: INTERVENTIONS:  1. Assist patient/family to identify coping skills, available support systems and cultural and spiritual values  2. Provide emotional support, including active listening and acknowledgement of concerns of patient and caregivers  3. Reduce environmental stimuli, as able  4. Instruct patient/family in relaxation techniques, as appropriate  5.  Assess for spiritual pain/suffering and initiate Spiritual Care, Psychosocial Clinical Specialist consults as needed  Outcome: Progressing  Flowsheets (Taken 11/23/2023 2332)  Patient/family able to verbalize anxieties, fears, and concerns, and demonstrate effective coping:   Assist patient/family to identify coping skills, available support systems and cultural and spiritual values   Provide emotional support, including active listening and acknowledgement of concerns of patient and caregivers   Reduce environmental stimuli, as able   Instruct patient/family in relaxation techniques, as appropriate   Assess for spiritual pain/suffering and initiate Spiritual Care, Psychosocial Clinical Specialist consults as needed

## 2023-11-24 NOTE — PROGRESS NOTES
Patient to operating room, no glasses, contacts, dentures, hearing aids, nipple rings left in and taped. All personal items left in room on floor.

## 2023-11-24 NOTE — ANESTHESIA PRE PROCEDURE
Department of Anesthesiology  Preprocedure Note       Name:  Alejandra Sheldon   Age:  33 y.o.  :  1995                                          MRN:  [id number]         Date:  2023      Surgeon: Dewayne St): Cheryl Greenberg MD    Procedure: Procedure(s):  LAPAROTOMY EXPLORATORY POSSIBLE COLOSTOMY    Medications prior to admission:   Prior to Admission medications    Medication Sig Start Date End Date Taking?  Authorizing Provider   venlafaxine (EFFEXOR XR) 150 MG extended release capsule Take 1 capsule by mouth at bedtime    Michael Krishnamurthy MD   busPIRone (BUSPAR) 10 MG tablet Take 1 tablet by mouth 2 times daily    Michael Krishnamurthy MD   melatonin 3 MG TABS tablet Take 2 tablets by mouth nightly as needed (to help sleep)    Michael Krishnamurthy MD   propranolol (INDERAL) 10 MG tablet Take 1 tablet by mouth 2 times daily    Michael Krishnamurthy MD   metoprolol succinate (TOPROL XL) 25 MG extended release tablet Take 1 tablet by mouth daily    Michael Krishnamurthy MD   sildenafil (VIAGRA) 100 MG tablet Take 1 tablet by mouth as needed for Erectile Dysfunction    Michael Krishnamurthy MD   risperiDONE (RISPERDAL) 0.5 MG tablet Take 1 tablet by mouth 2 times daily    Michael Krishnamurthy MD   famotidine (PEPCID) 20 MG tablet Take 1 tablet by mouth 2 times daily 10/6/23   Trisha Markham PA-C   traZODone (DESYREL) 150 MG tablet Take 1 tablet by mouth nightly    Michael Krishnamurthy MD   ondansetron (ZOFRAN-ODT) 4 MG disintegrating tablet Take 1 tablet by mouth every 8 hours as needed for Nausea or Vomiting 7/10/23   Karen RAY PA-C   midodrine (PROAMATINE) 10 MG tablet Take 1 tablet by mouth in the morning and at bedtime    Michael Krishnamurthy MD   fludrocortisone (FLORINEF) 0.1 MG tablet Take 2 tablets by mouth daily    ProviderMichael MD       Current medications:    Current Facility-Administered Medications   Medication Dose Route Frequency Provider Last Rate Last

## 2023-11-24 NOTE — PROGRESS NOTES
[facility]  General Surgery Postoperative Progress Note  Dr. Brown Holcomb Covering for Dr. Adrianne Lundy    Pt Name: Jose D Walker Record Number: [id number]  Date of Birth 1995   Today's Date: 11/24/2023    CC:Fever overnight    ASSESSMENT   POD # 4 Status post diagnostic laparoscopy to evaluate for anastomotic leak - which was negative findings  POD# 7 Status post robotic sigmoid colectomy with firefly and on table rigid sigmoidoscopy for chronic diverticulitis  Fever overnight  Concern for anastomotic leak with intraperitoneal air scattered throughout the abdomen  Generalized ascites  Developing abscess with fluid collection measuring approximately 5.0 x 6.4 x 2.7 cm   Trace pleural effusions  PLAN   Continue NG decompression. NPO at this time. Start [address] [place] [address] Drive [facility]   Stop Reglan and supp  Up ad mohamud  IV fluids  Blood cultures pending  Drain and wound management  Informed consent  Stat chest x-ray pre-op  Labs reviewed. WBC wnl. Replace potassium per protocol. Continue to monitor closely. Clinically patient looks ill. Abdomen distended with generalized pain and bloating. Drain with purulent feculent type drainage. CT imaging reviewed and concerning for anastomotic leak and developing abscess. Discussed CT findings with patient and recommend exploratory laparotomy with possible colostomy. Pros and cons of surgical intervention discussed along with associated possible risks and complications. Mother present and questions answered. Both agree they want to proceed with exploratory. Meena Infante is feeling worse today. Fever 101.4 overnight. Blood cultures pending. Very anxious this morning and has generalized pain and bloating. No flatus or BM. NELY drain malodorous with feculent type drainage. 350 cc out over the last 24 hours. Drainage around tube purulent. Urinating without complaints. NG tube in place with frothy, yellowish drainage.  Incisions are well

## 2023-11-24 NOTE — BRIEF OP NOTE
Brief Postoperative Note      Patient: Taylor Fisher  YOB: 1995  MRN: [id number]    Date of Procedure: 11/24/2023    Preoperative diagnosis:  Anastomotic leak  Pneumoperitoneum  Peritonitis  Postop day 4 status post diagnostic laparoscopy  Postop day 7 status post robotic sigmoid colectomy    Post-Op Diagnosis: Same       Procedure: Exploratory laparotomy with colonic anastomotic resection, washout, drain placement and end colostomy    Surgeon(s): Sue Bauer MD    Assistant:  First Assistant: Rosey Duarte RN    Anesthesia: General    Estimated Blood Loss (mL): 367PA    Complications: None    Specimens:   * No specimens in log *    Implants:  * No implants in log *      Drains:   Closed/Suction Drain RUQ Bulb (Active)       Closed/Suction Drain Lateral LUQ Bulb (Active)       Open Drain 11/24/23 Inferior;Midline Abdomen (Active)       NG/OG/NJ/NE Tube Nasogastric Right nostril (Active)   Surrounding Skin Clean, dry & intact 11/22/23 2054   Securement device Tape 11/24/23 0736   Status Suction-low intermittent 11/24/23 0736   Drainage Appearance Pink tinged 11/24/23 1501   Output (mL) 200 ml 11/24/23 1501       Colostomy LUQ (Active)       Urinary Catheter 11/24/23 Ruvalcaba (Active)       [REMOVED] Closed/Suction Drain Right Abdomen;RLQ Bulb (Removed)   Site Description Clean, dry & intact 11/24/23 0736   Dressing Status Clean, dry & intact 11/24/23 0834   Drainage Appearance Orvil Dills; Najera 11/24/23 1215   Drain Status Compressed 11/24/23 0834   Output (ml) 60 ml 11/24/23 1215       [REMOVED] Urinary Catheter 11/17/23 Ruvalcaba-Temperature; Ruvalcaba (Removed)   Catheter Indications Perioperative use for selected surgical procedures 11/18/23 0829   Site Assessment Pink 11/18/23 0829   Urine Color Lily 11/18/23 1505   Urine Appearance Cloudy 11/18/23 1505   Urine Odor Malodorous 11/18/23 1505   Collection Container Standard 11/18/23 1505   Securement Method Securing device (Describe) 11/18/23 1913

## 2023-11-24 NOTE — PROGRESS NOTES
1739 Pt to PACU, alert and oriented. Reports significant pain. 21  continue to medicate for pain    1754- continue to medicate for severe pain    1805- continue to medicate for severe pain    1815- pain improving 9/10    1829-- pain continues to improve, 7, oral care performed. 1834- pain tolerable. HR/BP remain elevated, discussed case with anesthesia, orders received. Call to Pt mother, updated on POC, pt mother states she will get dinner then report to pt room upon return to hospital.    Segun Serum- pt resting in bed eyes closed    1858- pt reoriented to situation when woke, oral care performed. Pt reports pain tolerable at this time. 1907- pt resting in bed eyes closed. 1916- pt resting in bed eyes closed. 1925- report called to [facility]- pt meets criteria for discharge from pacu, awaiting transport. 1947- pt resting in bed eyes closed.     2004-Transport arrives to return pt to  in stable condition

## 2023-11-24 NOTE — ANESTHESIA POSTPROCEDURE EVALUATION
Department of Anesthesiology  Postprocedure Note    Patient: Bala Pardo  MRN: [id number]  YOB: 1995  Date of evaluation: 11/24/2023      Procedure Summary     Date: 11/24/23 Room / Location: [address] [place] [address]    Anesthesia Start: 1520 Anesthesia Stop: 6707    Procedure: LAPAROTOMY EXPLORATORY COLOnic anastamosis resection with washout and end colostomy (Abdomen) Diagnosis:       Diverticulitis      (Diverticulitis [K57.92])    Surgeons: Diana Lopez MD Responsible Provider: Jose Delgadillo MD    Anesthesia Type: general ASA Status: 3          Anesthesia Type: No value filed.     Connor Phase I: Connor Score: 9    Connor Phase II:        Anesthesia Post Evaluation    Complications: no

## 2023-11-25 LAB
ANION GAP SERPL CALC-SCNC: 13 MEQ/L (ref 8–16)
BUN SERPL-MCNC: 19 MG/DL (ref 7–22)
CA-I BLD ISE-SCNC: 1.05 MMOL/L (ref 1.12–1.32)
CALCIUM SERPL-MCNC: 7.6 MG/DL (ref 8.5–10.5)
CHLORIDE SERPL-SCNC: 111 MEQ/L (ref 98–111)
CO2 SERPL-SCNC: 22 MEQ/L (ref 23–33)
CREAT SERPL-MCNC: 0.8 MG/DL (ref 0.4–1.2)
DEPRECATED RDW RBC AUTO: 42.8 FL (ref 35–45)
ERYTHROCYTE [DISTWIDTH] IN BLOOD BY AUTOMATED COUNT: 13.4 % (ref 11.5–14.5)
GFR SERPL CREATININE-BSD FRML MDRD: > 60 ML/MIN/1.73M2
GLUCOSE SERPL-MCNC: 120 MG/DL (ref 70–108)
HCT VFR BLD AUTO: 30.6 % (ref 42–52)
HGB BLD-MCNC: 10.2 GM/DL (ref 14–18)
MAGNESIUM SERPL-MCNC: 2.6 MG/DL (ref 1.6–2.4)
MCH RBC QN AUTO: 29.2 PG (ref 26–33)
MCHC RBC AUTO-ENTMCNC: 33.3 GM/DL (ref 32.2–35.5)
MCV RBC AUTO: 87.7 FL (ref 80–94)
PHOSPHATE SERPL-MCNC: 3.4 MG/DL (ref 2.4–4.7)
PLATELET # BLD AUTO: 305 THOU/MM3 (ref 130–400)
PMV BLD AUTO: 11.7 FL (ref 9.4–12.4)
POTASSIUM SERPL-SCNC: 3.6 MEQ/L (ref 3.5–5.2)
POTASSIUM SERPL-SCNC: 3.6 MEQ/L (ref 3.5–5.2)
RBC # BLD AUTO: 3.49 MILL/MM3 (ref 4.7–6.1)
SODIUM SERPL-SCNC: 146 MEQ/L (ref 135–145)
WBC # BLD AUTO: 13.2 THOU/MM3 (ref 4.8–10.8)

## 2023-11-25 PROCEDURE — 6370000000 HC RX 637 (ALT 250 FOR IP): Performed by: SURGERY

## 2023-11-25 PROCEDURE — 36415 COLL VENOUS BLD VENIPUNCTURE: CPT

## 2023-11-25 PROCEDURE — 6360000002 HC RX W HCPCS: Performed by: NURSE PRACTITIONER

## 2023-11-25 PROCEDURE — 1200000000 HC SEMI PRIVATE

## 2023-11-25 PROCEDURE — 2500000003 HC RX 250 WO HCPCS: Performed by: NURSE PRACTITIONER

## 2023-11-25 PROCEDURE — 2500000003 HC RX 250 WO HCPCS: Performed by: SURGERY

## 2023-11-25 PROCEDURE — 84100 ASSAY OF PHOSPHORUS: CPT

## 2023-11-25 PROCEDURE — 49020 DRAINAGE ABDOM ABSCESS OPEN: CPT | Performed by: SURGERY

## 2023-11-25 PROCEDURE — A4216 STERILE WATER/SALINE, 10 ML: HCPCS | Performed by: SURGERY

## 2023-11-25 PROCEDURE — 6360000002 HC RX W HCPCS: Performed by: SURGERY

## 2023-11-25 PROCEDURE — 2580000003 HC RX 258: Performed by: NURSE PRACTITIONER

## 2023-11-25 PROCEDURE — 82330 ASSAY OF CALCIUM: CPT

## 2023-11-25 PROCEDURE — 85027 COMPLETE CBC AUTOMATED: CPT

## 2023-11-25 PROCEDURE — 2580000003 HC RX 258: Performed by: SURGERY

## 2023-11-25 PROCEDURE — 83735 ASSAY OF MAGNESIUM: CPT

## 2023-11-25 PROCEDURE — 80048 BASIC METABOLIC PNL TOTAL CA: CPT

## 2023-11-25 RX ORDER — LEUCINE, PHENYLALANINE, LYSINE, METHIONINE, ISOLEUCINE, VALINE, HISTIDINE, THREONINE, TRYPTOPHAN, ALANINE, GLYCINE, ARGININE, PROLINE, SERINE, TYROSINE, DEXTROSE 311; 238; 247; 170; 255; 247; 204; 179; 77; 880; 438; 489; 289; 213; 17; 5 MG/100ML; MG/100ML; MG/100ML; MG/100ML; MG/100ML; MG/100ML; MG/100ML; MG/100ML; MG/100ML; MG/100ML; MG/100ML; MG/100ML; MG/100ML; MG/100ML; MG/100ML; G/100ML
2000 INJECTION INTRAVENOUS CONTINUOUS
Status: DISPENSED | OUTPATIENT
Start: 2023-11-25 | End: 2023-11-26

## 2023-11-25 RX ORDER — KETOROLAC TROMETHAMINE 30 MG/ML
15 INJECTION, SOLUTION INTRAMUSCULAR; INTRAVENOUS EVERY 6 HOURS
Status: COMPLETED | OUTPATIENT
Start: 2023-11-25 | End: 2023-11-29

## 2023-11-25 RX ORDER — FENTANYL CITRATE 50 UG/ML
50 INJECTION, SOLUTION INTRAMUSCULAR; INTRAVENOUS EVERY 5 MIN PRN
Status: DISCONTINUED | OUTPATIENT
Start: 2023-11-25 | End: 2023-11-25

## 2023-11-25 RX ORDER — ONDANSETRON 2 MG/ML
4 INJECTION INTRAMUSCULAR; INTRAVENOUS EVERY 6 HOURS PRN
Status: DISCONTINUED | OUTPATIENT
Start: 2023-11-25 | End: 2023-12-07 | Stop reason: HOSPADM

## 2023-11-25 RX ORDER — SODIUM CHLORIDE 9 MG/ML
INJECTION, SOLUTION INTRAVENOUS CONTINUOUS
Status: DISCONTINUED | OUTPATIENT
Start: 2023-11-25 | End: 2023-11-26

## 2023-11-25 RX ORDER — SODIUM CHLORIDE 0.9 % (FLUSH) 0.9 %
5-40 SYRINGE (ML) INJECTION PRN
Status: DISCONTINUED | OUTPATIENT
Start: 2023-11-25 | End: 2023-12-02 | Stop reason: SDUPTHER

## 2023-11-25 RX ORDER — SODIUM CHLORIDE 0.9 % (FLUSH) 0.9 %
5-40 SYRINGE (ML) INJECTION EVERY 12 HOURS SCHEDULED
Status: DISCONTINUED | OUTPATIENT
Start: 2023-11-25 | End: 2023-12-02 | Stop reason: SDUPTHER

## 2023-11-25 RX ORDER — ONDANSETRON 2 MG/ML
4 INJECTION INTRAMUSCULAR; INTRAVENOUS
Status: DISCONTINUED | OUTPATIENT
Start: 2023-11-25 | End: 2023-11-25 | Stop reason: SDUPTHER

## 2023-11-25 RX ORDER — SODIUM CHLORIDE 0.9 % (FLUSH) 0.9 %
5-40 SYRINGE (ML) INJECTION EVERY 12 HOURS SCHEDULED
Status: DISCONTINUED | OUTPATIENT
Start: 2023-11-25 | End: 2023-11-25 | Stop reason: SDUPTHER

## 2023-11-25 RX ORDER — CALCIUM GLUCONATE 20 MG/ML
2000 INJECTION, SOLUTION INTRAVENOUS PRN
Status: DISCONTINUED | OUTPATIENT
Start: 2023-11-25 | End: 2023-12-07 | Stop reason: HOSPADM

## 2023-11-25 RX ORDER — SODIUM CHLORIDE 0.9 % (FLUSH) 0.9 %
5-40 SYRINGE (ML) INJECTION PRN
Status: DISCONTINUED | OUTPATIENT
Start: 2023-11-25 | End: 2023-11-25 | Stop reason: SDUPTHER

## 2023-11-25 RX ORDER — ONDANSETRON 4 MG/1
4 TABLET, ORALLY DISINTEGRATING ORAL EVERY 8 HOURS PRN
Status: DISCONTINUED | OUTPATIENT
Start: 2023-11-25 | End: 2023-12-07 | Stop reason: HOSPADM

## 2023-11-25 RX ORDER — FAMOTIDINE 20 MG/1
20 TABLET, FILM COATED ORAL 2 TIMES DAILY
Status: DISCONTINUED | OUTPATIENT
Start: 2023-11-25 | End: 2023-12-07 | Stop reason: HOSPADM

## 2023-11-25 RX ORDER — ENOXAPARIN SODIUM 100 MG/ML
30 INJECTION SUBCUTANEOUS 2 TIMES DAILY
Status: DISCONTINUED | OUTPATIENT
Start: 2023-11-25 | End: 2023-12-02

## 2023-11-25 RX ORDER — SODIUM CHLORIDE 9 MG/ML
INJECTION, SOLUTION INTRAVENOUS PRN
Status: DISCONTINUED | OUTPATIENT
Start: 2023-11-25 | End: 2023-12-07 | Stop reason: HOSPADM

## 2023-11-25 RX ORDER — HYOSCYAMINE SULFATE 0.125 MG
125 TABLET,DISINTEGRATING ORAL EVERY 4 HOURS PRN
Status: DISCONTINUED | OUTPATIENT
Start: 2023-11-25 | End: 2023-12-07 | Stop reason: HOSPADM

## 2023-11-25 RX ORDER — ACETAMINOPHEN 325 MG/1
650 TABLET ORAL EVERY 4 HOURS PRN
Status: DISCONTINUED | OUTPATIENT
Start: 2023-11-25 | End: 2023-12-07 | Stop reason: HOSPADM

## 2023-11-25 RX ORDER — SODIUM CHLORIDE 9 MG/ML
INJECTION, SOLUTION INTRAVENOUS PRN
Status: DISCONTINUED | OUTPATIENT
Start: 2023-11-25 | End: 2023-11-25 | Stop reason: SDUPTHER

## 2023-11-25 RX ORDER — MEPERIDINE HYDROCHLORIDE 25 MG/ML
12.5 INJECTION INTRAMUSCULAR; INTRAVENOUS; SUBCUTANEOUS EVERY 5 MIN PRN
Status: DISCONTINUED | OUTPATIENT
Start: 2023-11-25 | End: 2023-11-25

## 2023-11-25 RX ADMIN — PIPERACILLIN SODIUM AND TAZOBACTAM SODIUM 3375 MG: 3; .375 INJECTION, POWDER, LYOPHILIZED, FOR SOLUTION INTRAVENOUS at 11:45

## 2023-11-25 RX ADMIN — ONDANSETRON 4 MG: 2 INJECTION INTRAMUSCULAR; INTRAVENOUS at 20:52

## 2023-11-25 RX ADMIN — HYDROMORPHONE HYDROCHLORIDE 1 MG: 1 INJECTION, SOLUTION INTRAMUSCULAR; INTRAVENOUS; SUBCUTANEOUS at 08:38

## 2023-11-25 RX ADMIN — PIPERACILLIN AND TAZOBACTAM 4500 MG: 4; .5 INJECTION, POWDER, FOR SOLUTION INTRAVENOUS at 18:22

## 2023-11-25 RX ADMIN — SODIUM CHLORIDE, PRESERVATIVE FREE 20 MG: 5 INJECTION INTRAVENOUS at 08:38

## 2023-11-25 RX ADMIN — SODIUM CHLORIDE: 9 INJECTION, SOLUTION INTRAVENOUS at 03:58

## 2023-11-25 RX ADMIN — KETOROLAC TROMETHAMINE 15 MG: 30 INJECTION, SOLUTION INTRAMUSCULAR; INTRAVENOUS at 13:50

## 2023-11-25 RX ADMIN — PROPRANOLOL HYDROCHLORIDE 10 MG: 10 TABLET ORAL at 08:45

## 2023-11-25 RX ADMIN — ENOXAPARIN SODIUM 30 MG: 100 INJECTION SUBCUTANEOUS at 08:44

## 2023-11-25 RX ADMIN — LORAZEPAM 0.5 MG: 2 INJECTION INTRAMUSCULAR; INTRAVENOUS at 08:39

## 2023-11-25 RX ADMIN — FAMOTIDINE 20 MG: 20 TABLET ORAL at 02:37

## 2023-11-25 RX ADMIN — BUSPIRONE HYDROCHLORIDE 10 MG: 10 TABLET ORAL at 20:39

## 2023-11-25 RX ADMIN — HYDROMORPHONE HYDROCHLORIDE 1 MG: 1 INJECTION, SOLUTION INTRAMUSCULAR; INTRAVENOUS; SUBCUTANEOUS at 20:53

## 2023-11-25 RX ADMIN — SODIUM CHLORIDE, PRESERVATIVE FREE 20 MG: 5 INJECTION INTRAVENOUS at 20:37

## 2023-11-25 RX ADMIN — HYDROMORPHONE HYDROCHLORIDE 1 MG: 1 INJECTION, SOLUTION INTRAMUSCULAR; INTRAVENOUS; SUBCUTANEOUS at 03:58

## 2023-11-25 RX ADMIN — CALCIUM GLUCONATE 2000 MG: 20 INJECTION, SOLUTION INTRAVENOUS at 13:22

## 2023-11-25 RX ADMIN — HYDROMORPHONE HYDROCHLORIDE 1 MG: 1 INJECTION, SOLUTION INTRAMUSCULAR; INTRAVENOUS; SUBCUTANEOUS at 17:45

## 2023-11-25 RX ADMIN — VENLAFAXINE HYDROCHLORIDE 150 MG: 150 CAPSULE, EXTENDED RELEASE ORAL at 20:39

## 2023-11-25 RX ADMIN — HYDROMORPHONE HYDROCHLORIDE 1 MG: 1 INJECTION, SOLUTION INTRAMUSCULAR; INTRAVENOUS; SUBCUTANEOUS at 01:18

## 2023-11-25 RX ADMIN — PIPERACILLIN SODIUM AND TAZOBACTAM SODIUM 3375 MG: 3; .375 INJECTION, POWDER, LYOPHILIZED, FOR SOLUTION INTRAVENOUS at 02:40

## 2023-11-25 RX ADMIN — HYDROMORPHONE HYDROCHLORIDE 1 MG: 1 INJECTION, SOLUTION INTRAMUSCULAR; INTRAVENOUS; SUBCUTANEOUS at 14:35

## 2023-11-25 RX ADMIN — ONDANSETRON 4 MG: 2 INJECTION INTRAMUSCULAR; INTRAVENOUS at 08:39

## 2023-11-25 RX ADMIN — METOPROLOL SUCCINATE 25 MG: 25 TABLET, EXTENDED RELEASE ORAL at 08:45

## 2023-11-25 RX ADMIN — PROPRANOLOL HYDROCHLORIDE 10 MG: 10 TABLET ORAL at 20:40

## 2023-11-25 RX ADMIN — ONDANSETRON 4 MG: 2 INJECTION INTRAMUSCULAR; INTRAVENOUS at 00:06

## 2023-11-25 RX ADMIN — RISPERIDONE 0.5 MG: 0.25 TABLET, FILM COATED ORAL at 20:40

## 2023-11-25 RX ADMIN — KETOROLAC TROMETHAMINE 15 MG: 30 INJECTION, SOLUTION INTRAMUSCULAR; INTRAVENOUS at 08:39

## 2023-11-25 RX ADMIN — ENOXAPARIN SODIUM 30 MG: 100 INJECTION SUBCUTANEOUS at 20:36

## 2023-11-25 RX ADMIN — TRAZODONE HYDROCHLORIDE 150 MG: 50 TABLET ORAL at 20:37

## 2023-11-25 RX ADMIN — RISPERIDONE 0.5 MG: 0.25 TABLET, FILM COATED ORAL at 08:45

## 2023-11-25 RX ADMIN — KETOROLAC TROMETHAMINE 15 MG: 30 INJECTION, SOLUTION INTRAMUSCULAR; INTRAVENOUS at 20:37

## 2023-11-25 RX ADMIN — BUSPIRONE HYDROCHLORIDE 10 MG: 10 TABLET ORAL at 08:44

## 2023-11-25 RX ADMIN — LEUCINE, PHENYLALANINE, LYSINE, METHIONINE, ISOLEUCINE, VALINE, HISTIDINE, THREONINE, TRYPTOPHAN, ALANINE, GLYCINE, ARGININE, PROLINE, SERINE, TYROSINE, DEXTROSE 2000 ML: 311; 238; 247; 170; 255; 247; 204; 179; 77; 880; 438; 489; 289; 213; 17; 5 INJECTION INTRAVENOUS at 17:47

## 2023-11-25 RX ADMIN — KETOROLAC TROMETHAMINE 15 MG: 30 INJECTION, SOLUTION INTRAMUSCULAR; INTRAVENOUS at 02:37

## 2023-11-25 RX ADMIN — PHENOL 1 SPRAY: 1.5 LIQUID ORAL at 08:38

## 2023-11-25 RX ADMIN — HYDROMORPHONE HYDROCHLORIDE 1 MG: 1 INJECTION, SOLUTION INTRAMUSCULAR; INTRAVENOUS; SUBCUTANEOUS at 11:42

## 2023-11-25 ASSESSMENT — PAIN SCALES - GENERAL
PAINLEVEL_OUTOF10: 7
PAINLEVEL_OUTOF10: 7
PAINLEVEL_OUTOF10: 8
PAINLEVEL_OUTOF10: 7
PAINLEVEL_OUTOF10: 7
PAINLEVEL_OUTOF10: 8
PAINLEVEL_OUTOF10: 8
PAINLEVEL_OUTOF10: 9
PAINLEVEL_OUTOF10: 6
PAINLEVEL_OUTOF10: 7

## 2023-11-25 ASSESSMENT — PAIN DESCRIPTION - LOCATION
LOCATION: ABDOMEN
LOCATION: BACK;HIP;LEG;ABDOMEN
LOCATION: ABDOMEN

## 2023-11-25 ASSESSMENT — PAIN DESCRIPTION - ONSET: ONSET: ON-GOING

## 2023-11-25 ASSESSMENT — PAIN DESCRIPTION - ORIENTATION
ORIENTATION: MID;RIGHT;LEFT
ORIENTATION: RIGHT;MID;LOWER
ORIENTATION: LEFT
ORIENTATION: MID;LOWER
ORIENTATION: RIGHT;LEFT
ORIENTATION: RIGHT;LEFT;MID

## 2023-11-25 ASSESSMENT — PAIN DESCRIPTION - FREQUENCY: FREQUENCY: CONTINUOUS

## 2023-11-25 ASSESSMENT — PAIN DESCRIPTION - DESCRIPTORS
DESCRIPTORS: ACHING;DISCOMFORT
DESCRIPTORS: ACHING;DISCOMFORT
DESCRIPTORS: DISCOMFORT
DESCRIPTORS: DISCOMFORT;DULL
DESCRIPTORS: DISCOMFORT
DESCRIPTORS: DISCOMFORT

## 2023-11-25 ASSESSMENT — PAIN DESCRIPTION - PAIN TYPE: TYPE: SURGICAL PAIN

## 2023-11-25 NOTE — PROGRESS NOTES
[facility]  General Surgery Postoperative Progress Note  Dr. Lawson  Covering for Dr. Stef Hubbard    Pt Name: Marya Levy Record Number: [id number]  Date of Birth 1995   Today's Date: 11/25/2023    CC: Abdominal pain    ASSESSMENT   POD #1 status post exploratory laparotomy with colonic anastomotic resection and end colostomy  POD #5 Status post diagnostic laparoscopy to evaluate for anastomotic leak - which was negative findings  POD #8 Status post robotic sigmoid colectomy with firefly and on table rigid sigmoidoscopy for chronic diverticulitis  Fever overnight  Concern for anastomotic leak with intraperitoneal air scattered throughout the abdomen  Generalized ascites  Developing abscess with fluid collection measuring approximately 5.0 x 6.4 x 2.7 cm   Trace pleural effusions  PLAN   Continue NG decompression. NPO at this time. Zosyn   Ativan for anxiety   Pain control  Remove Ruvalcaba catheter  Up ad mohamud  Start PPN over the weekend and initiate TPN when possible. IV fluids  Blood cultures in process  Drain and wound management  A.m. labs  Await bowel function  Ostomy/wound nursing consultation  SUBJECTIVE   Kenia Ellis was stable overnight. Afebrile. Mild tachycardia. 40 mL and 150 mL out of NELY drains. Fluid still turbid looking but starting to lighten up.  1275 mL urine out overnight. No signs of bleeding but lower portion of dressing draining by Penrose. Pain somewhat controlled with pain medication. Denies chest pain or shortness of breath. Complaining of NG tube. Ruvalcaba catheter in place.   CURRENT MEDICATIONS   Scheduled Meds:   sodium chloride flush  5-40 mL IntraVENous 2 times per day    famotidine  20 mg Oral BID    Or    famotidine (PEPCID) injection  20 mg IntraVENous BID    enoxaparin  30 mg SubCUTAneous BID    ketorolac  15 mg IntraVENous Q6H    piperacillin-tazobactam  3,375 mg IntraVENous Q8H    naloxegol  25 mg Oral Daily    busPIRone  10 mg Oral BID Numerous scattered pockets of free intraperitoneal air are seen both near the surgical anastomosis and throughout the    abdomen which may indicate anastomotic leak. Evaluation of the colon is limited due to under distention. 2. A small pocket of semi organized fluid in the dependent portion of the pelvis mildly (series 601, image 42 and series 301, image 86) enhances peripherally and contains foci of air density which could represent a developing abscess. The collection    measures approximately 5.0 x 6.4 x 2.7 cm (series 601, image 42 and series 301, image 66). 3. Trace bilateral pleural effusions and patchy bilateral lower lobe airspace opacities may indicate pneumonia in the appropriate clinical setting. Follow-up to ensure resolution is advised. Diffuse total body wall edema suggesting anasarca is present. 4. Nonspecific bladder wall thickening may be artifactual related to underdistention. Correlate with urinalysis. No renal calculus or obstructive uropathy is observed. **This report has been created using voice recognition software. It may contain minor errors which are inherent in voice recognition technology. **      Final report electronically signed by Dr Aureliano Sparrow on 11/24/2023 10:42 AM      XR ABDOMEN FOR NG/OG/NE TUBE PLACEMENT   Final Result      Nasogastric tube as above. Final report electronically signed by Dr. Julito Sterling on 11/22/2023 4:35 PM      XR ABDOMEN FOR NG/OG/NE TUBE PLACEMENT   Final Result      Nasogastric tube as above.       Final report electronically signed by Dr. Julito Sterling on 11/20/2023 4:49 PM        Electronically signed by Isaías Ruelas MD on 11/25/2023 at 8:06 AM

## 2023-11-25 NOTE — PLAN OF CARE
Problem: Discharge Planning  Goal: Discharge to home or other facility with appropriate resources  Outcome: Progressing  Flowsheets (Taken 11/25/2023 0136)  Discharge to home or other facility with appropriate resources:   Identify barriers to discharge with patient and caregiver   Arrange for needed discharge resources and transportation as appropriate   Identify discharge learning needs (meds, wound care, etc)   Arrange for interpreters to assist at discharge as needed   Refer to discharge planning if patient needs post-hospital services based on physician order or complex needs related to functional status, cognitive ability or social support system     Problem: Pain  Goal: Verbalizes/displays adequate comfort level or baseline comfort level  Outcome: Progressing  Flowsheets (Taken 11/25/2023 0136)  Verbalizes/displays adequate comfort level or baseline comfort level:   Encourage patient to monitor pain and request assistance   Assess pain using appropriate pain scale   Administer analgesics based on type and severity of pain and evaluate response   Implement non-pharmacological measures as appropriate and evaluate response   Consider cultural and social influences on pain and pain management   Notify Licensed Independent Practitioner if interventions unsuccessful or patient reports new pain     Problem: ABCDS Injury Assessment  Goal: Absence of physical injury  Outcome: Progressing  Flowsheets (Taken 11/25/2023 0136)  Absence of Physical Injury: Implement safety measures based on patient assessment     Problem: Safety - Adult  Goal: Free from fall injury  Outcome: Progressing  Flowsheets (Taken 11/25/2023 0136)  Free From Fall Injury: Instruct family/caregiver on patient safety     Problem: Skin/Tissue Integrity - Adult  Goal: Skin integrity remains intact  Outcome: Progressing  Flowsheets (Taken 11/25/2023 0136)  Skin Integrity Remains Intact:   Monitor for areas of redness and/or skin breakdown   Assess

## 2023-11-25 NOTE — PROGRESS NOTES
Pharmacy Consult for TPN/PPN Initiation    Assessment:   Indication for TPN: NPO, POD1 colonic anastomotic resection and end colostomy, POD 8 sigmoid colectomy  IV access: peripheral  Dosing weight: 89 kg    Plan:  10 kcal/kg = 890 kcal  Clinimix 5/15 at a rate of 110 mL/hr will provide 898 kcal. Will plan to convert to 3-in-1 TPN on 11/27/23 as appropriate. Electrolyte Replacement:   Calcium gluconate: 2 g    BMP, Mag, iCal, & Phos ordered for tomorrow with AM labs. Pharmacy will continue to follow. Thank you for the consult.     Wilfredo Coats, BCPS 11/25/2023 11:15 AM

## 2023-11-25 NOTE — PLAN OF CARE
Problem: Discharge Planning  Goal: Discharge to home or other facility with appropriate resources  11/25/2023 1105 by Keesha Watters RN  Outcome: Progressing  Flowsheets (Taken 11/25/2023 1197)  Discharge to home or other facility with appropriate resources: Identify barriers to discharge with patient and caregiver     Problem: Pain  Goal: Verbalizes/displays adequate comfort level or baseline comfort level  11/25/2023 1105 by Keesha Watters RN  Outcome: Progressing   Pain Assessment: 0-10  Pain Level: 8   Patient's Stated Pain Goal: 5   Is pain goal met at this time? No     Non-Pharmaceutical Pain Intervention(s): Ice, Repositioned, Rest    Problem: ABCDS Injury Assessment  Goal: Absence of physical injury  11/25/2023 1105 by Keesha Watters RN  Outcome: Progressing     Problem: Safety - Adult  Goal: Free from fall injury  11/25/2023 1105 by Keesha Watters RN  Outcome: Progressing   Fall assessment completed. Patient using call light appropriately to call for assistance with ambulation to bathroom. Personal items within reach. Patient is also compliant with use of non-skid slippers. Problem: Skin/Tissue Integrity - Adult  Goal: Skin integrity remains intact  11/25/2023 1105 by Keesha Watters RN  Outcome: Progressing  Flowsheets (Taken 11/25/2023 0755)  Skin Integrity Remains Intact: Monitor for areas of redness and/or skin breakdown     Problem: Skin/Tissue Integrity - Adult  Goal: Incisions, wounds, or drain sites healing without S/S of infection  11/25/2023 1105 by Keesha Watters RN  Outcome: Progressing  Flowsheets (Taken 11/25/2023 0755)  Incisions, Wounds, or Drain Sites Healing Without Sign and Symptoms of Infection: TWICE DAILY: Assess and document skin integrity     Problem: Skin/Tissue Integrity  Goal: Absence of new skin breakdown  Description: 1. Monitor for areas of redness and/or skin breakdown  2. Assess vascular access sites hourly  3.   Every 4-6 hours minimum:  Change oxygen Coping  Goal: Pt/Family able to verbalize concerns and demonstrate effective coping strategies  Description: INTERVENTIONS:  1. Assist patient/family to identify coping skills, available support systems and cultural and spiritual values  2. Provide emotional support, including active listening and acknowledgement of concerns of patient and caregivers  3. Reduce environmental stimuli, as able  4. Instruct patient/family in relaxation techniques, as appropriate  5. Assess for spiritual pain/suffering and initiate Spiritual Care, Psychosocial Clinical Specialist consults as needed  11/25/2023 1105 by June Norwood RN  Outcome: Progressing     Care plan reviewed with patient. Patient verbalize understanding of the plan of care and contribute to goal setting.

## 2023-11-25 NOTE — OP NOTE
especially down in the lower abdomen  upon entering into this. Bookwalter was placed for better retraction. Small bowel was attempted to be all mobilized. This again was fairly  thickened and dilated. There was one serosal tear that was oversewn  with 3-0 Vicryl suture. Otherwise, no significant enterotomies were  identified. After the Bookwalter was placed, we then further went down  into the pelvis and here the anastomosis more on the posterior aspect  appeared to be open. This was then transected distally with an [place]  LENA 45 green loads. This was then brought up out of the pelvis. Copious amounts of irrigation was used until eventually the irrigant  return was felt to be clear. Again, this in and of itself was difficult  to do simply because of how dilated his bowel and thickened his small  bowel was, but also especially because of his body habitus as he had a  lot of subcutaneous tissue as well as intraabdominal adipose tissue that  was really swollen. I did slightly mobilize the descending colon, but  then at this point, it was felt that he most likely is going to be able  to have enough to reach the skin down the left side as the end-colostomy  was going to be formatted. A disk of skin was taken out there on the  left side. Longitudinal incision through the rectus muscle. Three  finger dilation. And then the _____ broken down, hook up and descending  colon was brought out through this. Further irrigation out with  Irrisept. Hemostasis appeared to be adequate. At that time, [place]  was then removed. Irrisept placed. A new glove, gown, drape, etc., was  all brought to the table with the closing table. Irrisept was suctioned  out. A #19 round Jean Claude drain x2 was placed in the left and right side  and draped down along the pericolic gutters into the pelvis. These were  secured with silk suture.   The old drain was taken out and a small  amount of debridement around that old drain

## 2023-11-25 NOTE — PROGRESS NOTES
Comprehensive Nutrition Assessment    Type and Reason for Visit:  Initial, Consult (parenteral nutrition order and management)    Nutrition Recommendations/Plan:   PPN initiation with dose weight 89kgm, 10 kcal/kgm, Clinimix 4.25/5 - spoke with Pharmacy   Will transition to 3-in-1 PPN on 11/27 as appropriate   NPO as per Surgeon      Malnutrition Assessment:  Malnutrition Status:  Insufficient data (11/25/23 1226)    Context:  Acute Illness     Findings of the 6 clinical characteristics of malnutrition:  Energy Intake:  50% or less of estimated energy requirements for 5 or more days  Weight Loss:  No significant weight loss     Body Fat Loss:  Unable to assess     Muscle Mass Loss:  Unable to assess    Fluid Accumulation:  Unable to assess     Strength:  Not Performed    Nutrition Assessment:     Pt. nutritionally compromised AEB NPO x 5 days, to begin parenteral nutrition. At risk for further nutrition compromise r/t admit with diverticulitis, 11/17/23 sigmoid colectomy, 11/21 diagnostic laparoscopy, 11/24 colonic anastomotic resection and end colostomy and underlying medical condition (PTSD, HTN, vapes nicotine).        Nutrition Related Findings:    Pt. Report/Treatments/Miscellaneous: patient seen with RN, asked by RN to not awaken, spoke with Pharmacy regarding PPN start    GI Status: NG tube to suction with 1850ml output past 24 hours, emesis overnight, +BM 11/24  Pertinent Labs: Sodium 146, Potassium 3.6, BUN 19, Creatinine 0.8, Glucose 120, Phosphorus 3.4  Pertinent Meds: pepcid, zosyn, prn zofran      Wound Type: Surgical Incision (abdomen incision: 11/17 sigmoid colectomy; 11/24 colonic anastomotic resection and end colostomy)       Current Nutrition Intake & Therapies:    Average Meal Intake: 26-50%, % (while on po diet 11/19-11/20; currently NPO)     Diet NPO Exceptions are: Ice Chips, Sips of Water with Meds  Current Parenteral Nutrition Orders:  Type and Formula: Premix Peripheral (clinimix 4.25/5 solution; dose weight 89kgm, 10 kcal/kgm)   Lipids: None  Duration: Continuous  Rate/Volume: 110ml/ hour  Current PN Order Provides: to begin 11/25 PM: 898 kcals, 112 grams protein, 132 grams CHO/ 24 hours    Anthropometric Measures:  Height: 180.3 cm (5' 11\")  Ideal Body Weight (IBW): 172 lbs (78 kg)    Admission Body Weight: 119.3 kg (263 lb) (11/17; no edema)  Current Body Weight: 130.2 kg (287 lb) (11/25; no edema),   IBW. Current BMI (kg/m2): 40  Usual Body Weight:  (per EMR: 10/4/23 266# 6oz, 11/22/23 274# 2oz)     Weight Adjustment For:  (adjusted body weight: 195# (89kgm) for PN dose weight)                 BMI Categories: Obese Class 2 (BMI 35.0 -39.9)    Estimated Daily Nutrient Needs:  Energy Requirements Based On: Kcal/kg  Weight Used for Energy Requirements: Admission (119kgm on 11/17)  Energy (kcal/day): 1223-3061 kcals (15-18)  Weight Used for Protein Requirements: Ideal (78kgm)  Protein (g/day):  grams (1.2-2)       Nutrition Diagnosis:   Inadequate oral intake related to altered GI function as evidenced by NPO or clear liquid status due to medical condition    Nutrition Interventions:   Food and/or Nutrient Delivery: Continue NPO, Start Parenteral Nutrition  Nutrition Education/Counseling: Education not appropriate (asked to not awaken by RN; placed colostomy nutrition therapy guidelies at bedside)  Coordination of Nutrition Care: Continue to monitor while inpatient       Goals:     Goals: Initiate nutrition support, by next RD assessment       Nutrition Monitoring and Evaluation:      Food/Nutrient Intake Outcomes: Parenteral Nutrition Intake/Tolerance  Physical Signs/Symptoms Outcomes: Biochemical Data, Nutrition Focused Physical Findings, Skin, Weight, GI Status, Fluid Status or Edema    Discharge Planning:     Too soon to determine     Louie Bautista RD, LD  Contact: [phone]

## 2023-11-26 LAB
ALBUMIN SERPL BCG-MCNC: 2.1 G/DL (ref 3.5–5.1)
ALP SERPL-CCNC: 39 U/L (ref 38–126)
ALT SERPL W/O P-5'-P-CCNC: 20 U/L (ref 11–66)
ANION GAP SERPL CALC-SCNC: 9 MEQ/L (ref 8–16)
AST SERPL-CCNC: 56 U/L (ref 5–40)
BILIRUB CONJ SERPL-MCNC: 1.4 MG/DL (ref 0–0.3)
BILIRUB SERPL-MCNC: 1.8 MG/DL (ref 0.3–1.2)
BUN SERPL-MCNC: 27 MG/DL (ref 7–22)
CA-I BLD ISE-SCNC: 1.14 MMOL/L (ref 1.12–1.32)
CALCIUM SERPL-MCNC: 8 MG/DL (ref 8.5–10.5)
CHLORIDE SERPL-SCNC: 111 MEQ/L (ref 98–111)
CO2 SERPL-SCNC: 23 MEQ/L (ref 23–33)
CREAT SERPL-MCNC: 0.6 MG/DL (ref 0.4–1.2)
DEPRECATED RDW RBC AUTO: 45.3 FL (ref 35–45)
ERYTHROCYTE [DISTWIDTH] IN BLOOD BY AUTOMATED COUNT: 13.9 % (ref 11.5–14.5)
GFR SERPL CREATININE-BSD FRML MDRD: > 60 ML/MIN/1.73M2
GLUCOSE BLD STRIP.AUTO-MCNC: 120 MG/DL (ref 70–108)
GLUCOSE BLD STRIP.AUTO-MCNC: 121 MG/DL (ref 70–108)
GLUCOSE BLD STRIP.AUTO-MCNC: 123 MG/DL (ref 70–108)
GLUCOSE BLD STRIP.AUTO-MCNC: 91 MG/DL (ref 70–108)
GLUCOSE SERPL-MCNC: 137 MG/DL (ref 70–108)
HCT VFR BLD AUTO: 26.7 % (ref 42–52)
HGB BLD-MCNC: 8.7 GM/DL (ref 14–18)
MAGNESIUM SERPL-MCNC: 2.5 MG/DL (ref 1.6–2.4)
MCH RBC QN AUTO: 29 PG (ref 26–33)
MCHC RBC AUTO-ENTMCNC: 32.6 GM/DL (ref 32.2–35.5)
MCV RBC AUTO: 89 FL (ref 80–94)
PHOSPHATE SERPL-MCNC: 2.7 MG/DL (ref 2.4–4.7)
PLATELET # BLD AUTO: 244 THOU/MM3 (ref 130–400)
PMV BLD AUTO: 12 FL (ref 9.4–12.4)
POTASSIUM SERPL-SCNC: 3.3 MEQ/L (ref 3.5–5.2)
PROT SERPL-MCNC: 5 G/DL (ref 6.1–8)
RBC # BLD AUTO: 3 MILL/MM3 (ref 4.7–6.1)
SODIUM SERPL-SCNC: 143 MEQ/L (ref 135–145)
TRIGL SERPL-MCNC: 207 MG/DL (ref 0–199)
WBC # BLD AUTO: 11.3 THOU/MM3 (ref 4.8–10.8)

## 2023-11-26 PROCEDURE — 2500000003 HC RX 250 WO HCPCS: Performed by: PHARMACIST

## 2023-11-26 PROCEDURE — 2580000003 HC RX 258: Performed by: NURSE PRACTITIONER

## 2023-11-26 PROCEDURE — 84100 ASSAY OF PHOSPHORUS: CPT

## 2023-11-26 PROCEDURE — 6370000000 HC RX 637 (ALT 250 FOR IP): Performed by: PHARMACIST

## 2023-11-26 PROCEDURE — 82330 ASSAY OF CALCIUM: CPT

## 2023-11-26 PROCEDURE — 82948 REAGENT STRIP/BLOOD GLUCOSE: CPT

## 2023-11-26 PROCEDURE — 6360000002 HC RX W HCPCS: Performed by: SURGERY

## 2023-11-26 PROCEDURE — 2500000003 HC RX 250 WO HCPCS: Performed by: SURGERY

## 2023-11-26 PROCEDURE — 84478 ASSAY OF TRIGLYCERIDES: CPT

## 2023-11-26 PROCEDURE — A4216 STERILE WATER/SALINE, 10 ML: HCPCS | Performed by: SURGERY

## 2023-11-26 PROCEDURE — 1200000000 HC SEMI PRIVATE

## 2023-11-26 PROCEDURE — 6360000002 HC RX W HCPCS: Performed by: NURSE PRACTITIONER

## 2023-11-26 PROCEDURE — 85027 COMPLETE CBC AUTOMATED: CPT

## 2023-11-26 PROCEDURE — 2580000003 HC RX 258: Performed by: SURGERY

## 2023-11-26 PROCEDURE — APPSS30 APP SPLIT SHARED TIME 16-30 MINUTES: Performed by: NURSE PRACTITIONER

## 2023-11-26 PROCEDURE — 36415 COLL VENOUS BLD VENIPUNCTURE: CPT

## 2023-11-26 PROCEDURE — 6370000000 HC RX 637 (ALT 250 FOR IP): Performed by: SURGERY

## 2023-11-26 PROCEDURE — 82248 BILIRUBIN DIRECT: CPT

## 2023-11-26 PROCEDURE — 83735 ASSAY OF MAGNESIUM: CPT

## 2023-11-26 PROCEDURE — 99024 POSTOP FOLLOW-UP VISIT: CPT | Performed by: NURSE PRACTITIONER

## 2023-11-26 PROCEDURE — 80053 COMPREHEN METABOLIC PANEL: CPT

## 2023-11-26 RX ORDER — LEUCINE, PHENYLALANINE, LYSINE, METHIONINE, ISOLEUCINE, VALINE, HISTIDINE, THREONINE, TRYPTOPHAN, ALANINE, GLYCINE, ARGININE, PROLINE, SERINE, TYROSINE, DEXTROSE 311; 238; 247; 170; 255; 247; 204; 179; 77; 880; 438; 489; 289; 213; 17; 5 MG/100ML; MG/100ML; MG/100ML; MG/100ML; MG/100ML; MG/100ML; MG/100ML; MG/100ML; MG/100ML; MG/100ML; MG/100ML; MG/100ML; MG/100ML; MG/100ML; MG/100ML; G/100ML
2000 INJECTION INTRAVENOUS CONTINUOUS
Status: DISPENSED | OUTPATIENT
Start: 2023-11-26 | End: 2023-11-27

## 2023-11-26 RX ORDER — INSULIN LISPRO 100 [IU]/ML
0-4 INJECTION, SOLUTION INTRAVENOUS; SUBCUTANEOUS EVERY 6 HOURS
Status: DISCONTINUED | OUTPATIENT
Start: 2023-11-26 | End: 2023-12-07 | Stop reason: HOSPADM

## 2023-11-26 RX ORDER — FUROSEMIDE 10 MG/ML
10 INJECTION INTRAMUSCULAR; INTRAVENOUS ONCE
Status: COMPLETED | OUTPATIENT
Start: 2023-11-26 | End: 2023-11-26

## 2023-11-26 RX ORDER — POTASSIUM CHLORIDE 7.45 MG/ML
10 INJECTION INTRAVENOUS PRN
Status: DISCONTINUED | OUTPATIENT
Start: 2023-11-26 | End: 2023-11-29 | Stop reason: SDUPTHER

## 2023-11-26 RX ORDER — DEXTROSE MONOHYDRATE 100 MG/ML
INJECTION, SOLUTION INTRAVENOUS CONTINUOUS PRN
Status: DISCONTINUED | OUTPATIENT
Start: 2023-11-26 | End: 2023-12-07 | Stop reason: HOSPADM

## 2023-11-26 RX ORDER — INSULIN LISPRO 100 [IU]/ML
0-4 INJECTION, SOLUTION INTRAVENOUS; SUBCUTANEOUS EVERY 6 HOURS
Status: DISCONTINUED | OUTPATIENT
Start: 2023-11-26 | End: 2023-11-26

## 2023-11-26 RX ORDER — POTASSIUM CHLORIDE 20 MEQ/1
40 TABLET, EXTENDED RELEASE ORAL PRN
Status: DISCONTINUED | OUTPATIENT
Start: 2023-11-26 | End: 2023-12-07 | Stop reason: HOSPADM

## 2023-11-26 RX ORDER — IBUPROFEN 600 MG/1
1 TABLET ORAL PRN
Status: DISCONTINUED | OUTPATIENT
Start: 2023-11-26 | End: 2023-12-07 | Stop reason: HOSPADM

## 2023-11-26 RX ADMIN — KETOROLAC TROMETHAMINE 15 MG: 30 INJECTION, SOLUTION INTRAMUSCULAR; INTRAVENOUS at 09:01

## 2023-11-26 RX ADMIN — PIPERACILLIN AND TAZOBACTAM 4500 MG: 4; .5 INJECTION, POWDER, FOR SOLUTION INTRAVENOUS at 18:16

## 2023-11-26 RX ADMIN — SODIUM CHLORIDE, PRESERVATIVE FREE 20 MG: 5 INJECTION INTRAVENOUS at 09:01

## 2023-11-26 RX ADMIN — PIPERACILLIN AND TAZOBACTAM 4500 MG: 4; .5 INJECTION, POWDER, FOR SOLUTION INTRAVENOUS at 10:45

## 2023-11-26 RX ADMIN — HYDROMORPHONE HYDROCHLORIDE 1 MG: 1 INJECTION, SOLUTION INTRAMUSCULAR; INTRAVENOUS; SUBCUTANEOUS at 13:38

## 2023-11-26 RX ADMIN — HYDROMORPHONE HYDROCHLORIDE 1 MG: 1 INJECTION, SOLUTION INTRAMUSCULAR; INTRAVENOUS; SUBCUTANEOUS at 17:33

## 2023-11-26 RX ADMIN — PIPERACILLIN AND TAZOBACTAM 4500 MG: 4; .5 INJECTION, POWDER, FOR SOLUTION INTRAVENOUS at 02:59

## 2023-11-26 RX ADMIN — KETOROLAC TROMETHAMINE 15 MG: 30 INJECTION, SOLUTION INTRAMUSCULAR; INTRAVENOUS at 14:23

## 2023-11-26 RX ADMIN — HYDROMORPHONE HYDROCHLORIDE 1 MG: 1 INJECTION, SOLUTION INTRAMUSCULAR; INTRAVENOUS; SUBCUTANEOUS at 00:10

## 2023-11-26 RX ADMIN — KETOROLAC TROMETHAMINE 15 MG: 30 INJECTION, SOLUTION INTRAMUSCULAR; INTRAVENOUS at 20:57

## 2023-11-26 RX ADMIN — ENOXAPARIN SODIUM 30 MG: 100 INJECTION SUBCUTANEOUS at 09:01

## 2023-11-26 RX ADMIN — ENOXAPARIN SODIUM 30 MG: 100 INJECTION SUBCUTANEOUS at 20:57

## 2023-11-26 RX ADMIN — PROPRANOLOL HYDROCHLORIDE 10 MG: 10 TABLET ORAL at 09:05

## 2023-11-26 RX ADMIN — SODIUM CHLORIDE, PRESERVATIVE FREE 20 MG: 5 INJECTION INTRAVENOUS at 20:57

## 2023-11-26 RX ADMIN — PROPRANOLOL HYDROCHLORIDE 10 MG: 10 TABLET ORAL at 20:57

## 2023-11-26 RX ADMIN — HYDROMORPHONE HYDROCHLORIDE 1 MG: 1 INJECTION, SOLUTION INTRAMUSCULAR; INTRAVENOUS; SUBCUTANEOUS at 06:44

## 2023-11-26 RX ADMIN — RISPERIDONE 0.5 MG: 0.25 TABLET, FILM COATED ORAL at 09:05

## 2023-11-26 RX ADMIN — HYDROMORPHONE HYDROCHLORIDE 1 MG: 1 INJECTION, SOLUTION INTRAMUSCULAR; INTRAVENOUS; SUBCUTANEOUS at 03:29

## 2023-11-26 RX ADMIN — LORAZEPAM 0.5 MG: 2 INJECTION INTRAMUSCULAR; INTRAVENOUS at 00:20

## 2023-11-26 RX ADMIN — VENLAFAXINE HYDROCHLORIDE 150 MG: 150 CAPSULE, EXTENDED RELEASE ORAL at 20:57

## 2023-11-26 RX ADMIN — KETOROLAC TROMETHAMINE 15 MG: 30 INJECTION, SOLUTION INTRAMUSCULAR; INTRAVENOUS at 02:51

## 2023-11-26 RX ADMIN — ONDANSETRON 4 MG: 2 INJECTION INTRAMUSCULAR; INTRAVENOUS at 09:08

## 2023-11-26 RX ADMIN — POTASSIUM CHLORIDE 40 MEQ: 1500 TABLET, EXTENDED RELEASE ORAL at 17:33

## 2023-11-26 RX ADMIN — SODIUM CHLORIDE, PRESERVATIVE FREE 10 ML: 5 INJECTION INTRAVENOUS at 21:04

## 2023-11-26 RX ADMIN — BUSPIRONE HYDROCHLORIDE 10 MG: 10 TABLET ORAL at 09:05

## 2023-11-26 RX ADMIN — METOPROLOL SUCCINATE 25 MG: 25 TABLET, EXTENDED RELEASE ORAL at 09:05

## 2023-11-26 RX ADMIN — LEUCINE, PHENYLALANINE, LYSINE, METHIONINE, ISOLEUCINE, VALINE, HISTIDINE, THREONINE, TRYPTOPHAN, ALANINE, GLYCINE, ARGININE, PROLINE, SERINE, TYROSINE, DEXTROSE 2000 ML: 311; 238; 247; 170; 255; 247; 204; 179; 77; 880; 438; 489; 289; 213; 17; 5 INJECTION INTRAVENOUS at 17:55

## 2023-11-26 RX ADMIN — BUSPIRONE HYDROCHLORIDE 10 MG: 10 TABLET ORAL at 20:57

## 2023-11-26 RX ADMIN — HYDROMORPHONE HYDROCHLORIDE 1 MG: 1 INJECTION, SOLUTION INTRAMUSCULAR; INTRAVENOUS; SUBCUTANEOUS at 10:40

## 2023-11-26 RX ADMIN — TRAZODONE HYDROCHLORIDE 150 MG: 50 TABLET ORAL at 20:57

## 2023-11-26 RX ADMIN — FUROSEMIDE 10 MG: 10 INJECTION, SOLUTION INTRAMUSCULAR; INTRAVENOUS at 13:37

## 2023-11-26 RX ADMIN — LORAZEPAM 0.5 MG: 2 INJECTION INTRAMUSCULAR; INTRAVENOUS at 14:23

## 2023-11-26 RX ADMIN — RISPERIDONE 0.5 MG: 0.25 TABLET, FILM COATED ORAL at 21:04

## 2023-11-26 ASSESSMENT — PAIN DESCRIPTION - LOCATION
LOCATION: ABDOMEN

## 2023-11-26 ASSESSMENT — PAIN SCALES - GENERAL
PAINLEVEL_OUTOF10: 9
PAINLEVEL_OUTOF10: 7
PAINLEVEL_OUTOF10: 7
PAINLEVEL_OUTOF10: 8
PAINLEVEL_OUTOF10: 7
PAINLEVEL_OUTOF10: 9
PAINLEVEL_OUTOF10: 6
PAINLEVEL_OUTOF10: 6
PAINLEVEL_OUTOF10: 8
PAINLEVEL_OUTOF10: 8
PAINLEVEL_OUTOF10: 6

## 2023-11-26 ASSESSMENT — PAIN DESCRIPTION - ORIENTATION
ORIENTATION: MID;LOWER
ORIENTATION: LEFT
ORIENTATION: MID;LOWER
ORIENTATION: MID;RIGHT;LEFT
ORIENTATION: RIGHT;MID;LOWER
ORIENTATION: MID;LOWER
ORIENTATION: RIGHT;LEFT;MID
ORIENTATION: RIGHT
ORIENTATION: LOWER;MID

## 2023-11-26 ASSESSMENT — PAIN DESCRIPTION - DESCRIPTORS
DESCRIPTORS: DISCOMFORT;SORE;PRESSURE
DESCRIPTORS: DISCOMFORT
DESCRIPTORS: STABBING
DESCRIPTORS: DISCOMFORT
DESCRIPTORS: DISCOMFORT;SHARP
DESCRIPTORS: DISCOMFORT

## 2023-11-26 NOTE — PROGRESS NOTES
[facility]  General Surgery Postoperative Progress Note  Dr. Rupa Johns Covering for Dr. Stuart Branham    Pt Name: Alejandro Reno Record Number: [id number]  Date of Birth 1995   Today's Date: 11/26/2023    CC: Abdominal pain    ASSESSMENT   POD #2 Status post exploratory laparotomy with colonic anastomotic resection and end colostomy  POD #6 Status post diagnostic laparoscopy to evaluate for anastomotic leak - which was negative findings  POD #9 Status post robotic sigmoid colectomy with firefly and on table rigid sigmoidoscopy for chronic diverticulitis  Acute postoperative pain  Elevated LFTs  Trace pleural effusions  Generalized ascites  Morbid obesity (BMI 40)  PLAN   Continue NG decompression. NPO with ice chips, sips and popsicles (every shift). Patient continues to want NG tube out and continue to educate on the importance of keeping it in place. Stop IV fluids. PPN running. Transition to TPN if able to get PICC line. Pharmacy dosing. Diuresis as tolerated - lasix 10 mg given x1. Monitor K+. Continue Zosyn   Pain & anxiety control  Ruvalcaba out and urinating spontaneously   Up with assistance   DVT prophylaxis with Lovenox  NELY and incisional care - wound and ostomy consultation  Labs reviewed. WBC and platelets trending down. LFTs bumped - unknown etiology. Continue to trend. Hemoglobin 8.7 from 10.2 but no signs of active bleeding. Replace electrolytes as needed   Supportive care  SUBJECTIVE   Stanislav stable. VS reviewed. Afebrile. Tachycardia resolved. States his throat is sore from NG tube and wants it out. Long discussion regarding the need for NG tube to stay in place. Compromised on popsicle every shift and sips. Continue decompression. Abdominal pain has improved though. Fluid from NELY drains still turbid looking but starting to lighten up. Left drain 110 cc and right 60 cc/ 24 hours. No signs of bleeding but lower portion of dressing draining by Penrose.  Denies chest process. **This report has been created using voice recognition software. It may contain minor errors which are inherent in voice recognition technology. **      Final report electronically signed by Dr. Yin Kirby on 11/24/2023 2:47 PM      CT ABDOMEN PELVIS W IV CONTRAST Additional Contrast? None   Final Result   1. Suspect small bowel obstruction with transition point in the mid central small bowel loops (series 301, images 60 through 64). Numerous scattered pockets of free intraperitoneal air are seen both near the surgical anastomosis and throughout the    abdomen which may indicate anastomotic leak. Evaluation of the colon is limited due to under distention. 2. A small pocket of semi organized fluid in the dependent portion of the pelvis mildly (series 601, image 42 and series 301, image 86) enhances peripherally and contains foci of air density which could represent a developing abscess. The collection    measures approximately 5.0 x 6.4 x 2.7 cm (series 601, image 42 and series 301, image 66). 3. Trace bilateral pleural effusions and patchy bilateral lower lobe airspace opacities may indicate pneumonia in the appropriate clinical setting. Follow-up to ensure resolution is advised. Diffuse total body wall edema suggesting anasarca is present. 4. Nonspecific bladder wall thickening may be artifactual related to underdistention. Correlate with urinalysis. No renal calculus or obstructive uropathy is observed. **This report has been created using voice recognition software. It may contain minor errors which are inherent in voice recognition technology. **      Final report electronically signed by Dr Melina Saleh on 11/24/2023 10:42 AM      XR ABDOMEN FOR NG/OG/NE TUBE PLACEMENT   Final Result      Nasogastric tube as above.       Final report electronically signed by Dr. Yin Kirby on 11/22/2023 4:35 PM      XR ABDOMEN FOR NG/OG/NE TUBE PLACEMENT   Final Result

## 2023-11-26 NOTE — PROGRESS NOTES
Pharmacy Consult for TPN/PPN Monitoring & Adjustment  IV Access: peripheral      Dosing weight: 89 kg  Current insulin regimen: low sliding scale - ordered   Diet (excluding TPN): NPO  Maintenance fluid: none    Summary of changes for tonight's TPN: no change, continue Clinimix 4.25/5 at 110 ml/hr    Electrolyte Replacement:   Potassium chloride: 40 mEq    Monitoring:  BMP, Mag, iCal, & Phos ordered for tomorrow with AM labs. Pharmacy will continue to follow daily. Thank you for the consult.   Corwin Cam, RockyD, BCPS   11/26/2023  1:07 PM

## 2023-11-26 NOTE — PLAN OF CARE
Problem: Discharge Planning  Goal: Discharge to home or other facility with appropriate resources  11/25/2023 2338 by Tyson Bautista RN  Outcome: Progressing  11/25/2023 1105 by Brandin Ross RN  Outcome: Progressing  Flowsheets (Taken 11/25/2023 2988)  Discharge to home or other facility with appropriate resources: Identify barriers to discharge with patient and caregiver     Problem: Pain  Goal: Verbalizes/displays adequate comfort level or baseline comfort level  11/25/2023 2338 by Tyson Bautista RN  Outcome: Progressing  11/25/2023 1105 by Brandin Ross RN  Outcome: Progressing     Problem: ABCDS Injury Assessment  Goal: Absence of physical injury  11/25/2023 2338 by Tyson Bautista RN  Outcome: Progressing  11/25/2023 1105 by Brandin Ross RN  Outcome: Progressing     Problem: Safety - Adult  Goal: Free from fall injury  11/25/2023 2338 by Tyson Bautista RN  Outcome: Progressing  11/25/2023 1105 by Brandin Ross RN  Outcome: Progressing     Problem: Skin/Tissue Integrity - Adult  Goal: Skin integrity remains intact  11/25/2023 2338 by Tyson Bautista RN  Outcome: Progressing  11/25/2023 1105 by Brandin Ross RN  Outcome: Progressing  Flowsheets (Taken 11/25/2023 0755)  Skin Integrity Remains Intact: Monitor for areas of redness and/or skin breakdown  Goal: Incisions, wounds, or drain sites healing without S/S of infection  11/25/2023 2338 by Tyson Bautista RN  Outcome: Progressing  11/25/2023 1105 by Brandin Ross RN  Outcome: Progressing  Flowsheets (Taken 11/25/2023 0755)  Incisions, Wounds, or Drain Sites Healing Without Sign and Symptoms of Infection: TWICE DAILY: Assess and document skin integrity     Problem: Skin/Tissue Integrity  Goal: Absence of new skin breakdown  Description: 1. Monitor for areas of redness and/or skin breakdown  2. Assess vascular access sites hourly  3. Every 4-6 hours minimum:  Change oxygen saturation probe site  4.   Every 4-6 hours:  If on nasal

## 2023-11-27 LAB
ALBUMIN SERPL BCG-MCNC: 1.8 G/DL (ref 3.5–5.1)
ALP SERPL-CCNC: 46 U/L (ref 38–126)
ALT SERPL W/O P-5'-P-CCNC: 27 U/L (ref 11–66)
ANION GAP SERPL CALC-SCNC: 10 MEQ/L (ref 8–16)
AST SERPL-CCNC: 63 U/L (ref 5–40)
BILIRUB CONJ SERPL-MCNC: 1.9 MG/DL (ref 0–0.3)
BILIRUB SERPL-MCNC: 2.2 MG/DL (ref 0.3–1.2)
BUN SERPL-MCNC: 25 MG/DL (ref 7–22)
CA-I BLD ISE-SCNC: 1.12 MMOL/L (ref 1.12–1.32)
CALCIUM SERPL-MCNC: 7.8 MG/DL (ref 8.5–10.5)
CHLORIDE SERPL-SCNC: 103 MEQ/L (ref 98–111)
CO2 SERPL-SCNC: 25 MEQ/L (ref 23–33)
CREAT SERPL-MCNC: 0.7 MG/DL (ref 0.4–1.2)
DEPRECATED RDW RBC AUTO: 45.2 FL (ref 35–45)
ERYTHROCYTE [DISTWIDTH] IN BLOOD BY AUTOMATED COUNT: 14 % (ref 11.5–14.5)
GFR SERPL CREATININE-BSD FRML MDRD: > 60 ML/MIN/1.73M2
GLUCOSE BLD STRIP.AUTO-MCNC: 104 MG/DL (ref 70–108)
GLUCOSE BLD STRIP.AUTO-MCNC: 105 MG/DL (ref 70–108)
GLUCOSE BLD STRIP.AUTO-MCNC: 115 MG/DL (ref 70–108)
GLUCOSE BLD STRIP.AUTO-MCNC: 116 MG/DL (ref 70–108)
GLUCOSE BLD STRIP.AUTO-MCNC: 98 MG/DL (ref 70–108)
GLUCOSE SERPL-MCNC: 114 MG/DL (ref 70–108)
HCT VFR BLD AUTO: 26.8 % (ref 42–52)
HGB BLD-MCNC: 8.7 GM/DL (ref 14–18)
MAGNESIUM SERPL-MCNC: 2.1 MG/DL (ref 1.6–2.4)
MCH RBC QN AUTO: 28.7 PG (ref 26–33)
MCHC RBC AUTO-ENTMCNC: 32.5 GM/DL (ref 32.2–35.5)
MCV RBC AUTO: 88.4 FL (ref 80–94)
PHOSPHATE SERPL-MCNC: 2.8 MG/DL (ref 2.4–4.7)
PLATELET # BLD AUTO: 242 THOU/MM3 (ref 130–400)
PMV BLD AUTO: 11.7 FL (ref 9.4–12.4)
POTASSIUM SERPL-SCNC: 3 MEQ/L (ref 3.5–5.2)
POTASSIUM SERPL-SCNC: 3.3 MEQ/L (ref 3.5–5.2)
PROT SERPL-MCNC: 5.4 G/DL (ref 6.1–8)
RBC # BLD AUTO: 3.03 MILL/MM3 (ref 4.7–6.1)
SODIUM SERPL-SCNC: 138 MEQ/L (ref 135–145)
WBC # BLD AUTO: 15.6 THOU/MM3 (ref 4.8–10.8)

## 2023-11-27 PROCEDURE — 6360000002 HC RX W HCPCS: Performed by: PHARMACIST

## 2023-11-27 PROCEDURE — 6360000002 HC RX W HCPCS: Performed by: SURGERY

## 2023-11-27 PROCEDURE — 82948 REAGENT STRIP/BLOOD GLUCOSE: CPT

## 2023-11-27 PROCEDURE — 1200000000 HC SEMI PRIVATE

## 2023-11-27 PROCEDURE — 2580000003 HC RX 258: Performed by: NURSE PRACTITIONER

## 2023-11-27 PROCEDURE — 6370000000 HC RX 637 (ALT 250 FOR IP): Performed by: PHARMACIST

## 2023-11-27 PROCEDURE — 82330 ASSAY OF CALCIUM: CPT

## 2023-11-27 PROCEDURE — 6370000000 HC RX 637 (ALT 250 FOR IP): Performed by: SURGERY

## 2023-11-27 PROCEDURE — 6360000002 HC RX W HCPCS: Performed by: NURSE PRACTITIONER

## 2023-11-27 PROCEDURE — 80053 COMPREHEN METABOLIC PANEL: CPT

## 2023-11-27 PROCEDURE — 2500000003 HC RX 250 WO HCPCS: Performed by: NURSE PRACTITIONER

## 2023-11-27 PROCEDURE — 82248 BILIRUBIN DIRECT: CPT

## 2023-11-27 PROCEDURE — 83735 ASSAY OF MAGNESIUM: CPT

## 2023-11-27 PROCEDURE — 84100 ASSAY OF PHOSPHORUS: CPT

## 2023-11-27 PROCEDURE — 36415 COLL VENOUS BLD VENIPUNCTURE: CPT

## 2023-11-27 PROCEDURE — 85027 COMPLETE CBC AUTOMATED: CPT

## 2023-11-27 PROCEDURE — 84132 ASSAY OF SERUM POTASSIUM: CPT

## 2023-11-27 PROCEDURE — 99024 POSTOP FOLLOW-UP VISIT: CPT | Performed by: SURGERY

## 2023-11-27 PROCEDURE — 2580000003 HC RX 258: Performed by: SURGERY

## 2023-11-27 RX ADMIN — HYDROMORPHONE HYDROCHLORIDE 1 MG: 1 INJECTION, SOLUTION INTRAMUSCULAR; INTRAVENOUS; SUBCUTANEOUS at 08:30

## 2023-11-27 RX ADMIN — HYDROMORPHONE HYDROCHLORIDE 1 MG: 1 INJECTION, SOLUTION INTRAMUSCULAR; INTRAVENOUS; SUBCUTANEOUS at 00:30

## 2023-11-27 RX ADMIN — FAMOTIDINE 20 MG: 20 TABLET ORAL at 20:27

## 2023-11-27 RX ADMIN — POTASSIUM CHLORIDE 10 MEQ: 7.46 INJECTION, SOLUTION INTRAVENOUS at 09:38

## 2023-11-27 RX ADMIN — HYDROMORPHONE HYDROCHLORIDE 0.5 MG: 1 INJECTION, SOLUTION INTRAMUSCULAR; INTRAVENOUS; SUBCUTANEOUS at 23:43

## 2023-11-27 RX ADMIN — POTASSIUM CHLORIDE 10 MEQ: 7.46 INJECTION, SOLUTION INTRAVENOUS at 15:01

## 2023-11-27 RX ADMIN — HYDROMORPHONE HYDROCHLORIDE 1 MG: 1 INJECTION, SOLUTION INTRAMUSCULAR; INTRAVENOUS; SUBCUTANEOUS at 19:46

## 2023-11-27 RX ADMIN — POTASSIUM CHLORIDE 10 MEQ: 7.46 INJECTION, SOLUTION INTRAVENOUS at 13:54

## 2023-11-27 RX ADMIN — PROPRANOLOL HYDROCHLORIDE 10 MG: 10 TABLET ORAL at 09:34

## 2023-11-27 RX ADMIN — POTASSIUM CHLORIDE 10 MEQ: 7.46 INJECTION, SOLUTION INTRAVENOUS at 12:44

## 2023-11-27 RX ADMIN — TRAZODONE HYDROCHLORIDE 150 MG: 50 TABLET ORAL at 20:27

## 2023-11-27 RX ADMIN — HYDROMORPHONE HYDROCHLORIDE 1 MG: 1 INJECTION, SOLUTION INTRAMUSCULAR; INTRAVENOUS; SUBCUTANEOUS at 11:28

## 2023-11-27 RX ADMIN — KETOROLAC TROMETHAMINE 15 MG: 30 INJECTION, SOLUTION INTRAMUSCULAR; INTRAVENOUS at 20:28

## 2023-11-27 RX ADMIN — SODIUM CHLORIDE, PRESERVATIVE FREE 10 ML: 5 INJECTION INTRAVENOUS at 20:29

## 2023-11-27 RX ADMIN — POTASSIUM CHLORIDE 40 MEQ: 1500 TABLET, EXTENDED RELEASE ORAL at 18:43

## 2023-11-27 RX ADMIN — POTASSIUM CHLORIDE 10 MEQ: 7.46 INJECTION, SOLUTION INTRAVENOUS at 11:51

## 2023-11-27 RX ADMIN — FAMOTIDINE 20 MG: 20 TABLET ORAL at 09:34

## 2023-11-27 RX ADMIN — PIPERACILLIN AND TAZOBACTAM 4500 MG: 4; .5 INJECTION, POWDER, FOR SOLUTION INTRAVENOUS at 11:07

## 2023-11-27 RX ADMIN — ENOXAPARIN SODIUM 30 MG: 100 INJECTION SUBCUTANEOUS at 20:28

## 2023-11-27 RX ADMIN — PIPERACILLIN AND TAZOBACTAM 4500 MG: 4; .5 INJECTION, POWDER, FOR SOLUTION INTRAVENOUS at 18:46

## 2023-11-27 RX ADMIN — ONDANSETRON 4 MG: 2 INJECTION INTRAMUSCULAR; INTRAVENOUS at 04:57

## 2023-11-27 RX ADMIN — BUSPIRONE HYDROCHLORIDE 10 MG: 10 TABLET ORAL at 09:33

## 2023-11-27 RX ADMIN — PIPERACILLIN AND TAZOBACTAM 4500 MG: 4; .5 INJECTION, POWDER, FOR SOLUTION INTRAVENOUS at 02:46

## 2023-11-27 RX ADMIN — HYDROMORPHONE HYDROCHLORIDE 1 MG: 1 INJECTION, SOLUTION INTRAMUSCULAR; INTRAVENOUS; SUBCUTANEOUS at 15:09

## 2023-11-27 RX ADMIN — PROPRANOLOL HYDROCHLORIDE 10 MG: 10 TABLET ORAL at 20:26

## 2023-11-27 RX ADMIN — CALCIUM GLUCONATE: 98 INJECTION, SOLUTION INTRAVENOUS at 17:37

## 2023-11-27 RX ADMIN — ONDANSETRON 4 MG: 2 INJECTION INTRAMUSCULAR; INTRAVENOUS at 20:33

## 2023-11-27 RX ADMIN — ENOXAPARIN SODIUM 30 MG: 100 INJECTION SUBCUTANEOUS at 09:33

## 2023-11-27 RX ADMIN — POTASSIUM CHLORIDE 10 MEQ: 7.46 INJECTION, SOLUTION INTRAVENOUS at 10:54

## 2023-11-27 RX ADMIN — BUSPIRONE HYDROCHLORIDE 10 MG: 10 TABLET ORAL at 20:26

## 2023-11-27 RX ADMIN — RISPERIDONE 0.5 MG: 0.25 TABLET, FILM COATED ORAL at 09:34

## 2023-11-27 RX ADMIN — KETOROLAC TROMETHAMINE 15 MG: 30 INJECTION, SOLUTION INTRAMUSCULAR; INTRAVENOUS at 09:35

## 2023-11-27 RX ADMIN — VENLAFAXINE HYDROCHLORIDE 150 MG: 150 CAPSULE, EXTENDED RELEASE ORAL at 20:42

## 2023-11-27 RX ADMIN — KETOROLAC TROMETHAMINE 15 MG: 30 INJECTION, SOLUTION INTRAMUSCULAR; INTRAVENOUS at 02:48

## 2023-11-27 RX ADMIN — LORAZEPAM 0.5 MG: 2 INJECTION INTRAMUSCULAR; INTRAVENOUS at 17:30

## 2023-11-27 RX ADMIN — KETOROLAC TROMETHAMINE 15 MG: 30 INJECTION, SOLUTION INTRAMUSCULAR; INTRAVENOUS at 13:12

## 2023-11-27 RX ADMIN — RISPERIDONE 0.5 MG: 0.25 TABLET, FILM COATED ORAL at 20:26

## 2023-11-27 RX ADMIN — METOPROLOL SUCCINATE 25 MG: 25 TABLET, EXTENDED RELEASE ORAL at 09:35

## 2023-11-27 RX ADMIN — FLUDROCORTISONE ACETATE 0.2 MG: 0.1 TABLET ORAL at 09:35

## 2023-11-27 ASSESSMENT — PAIN DESCRIPTION - ORIENTATION
ORIENTATION: LOWER;MID;RIGHT;LEFT
ORIENTATION: LEFT
ORIENTATION: LOWER;RIGHT;LEFT;MID
ORIENTATION: LEFT

## 2023-11-27 ASSESSMENT — PAIN DESCRIPTION - FREQUENCY
FREQUENCY: CONTINUOUS

## 2023-11-27 ASSESSMENT — PAIN DESCRIPTION - PAIN TYPE
TYPE: SURGICAL PAIN

## 2023-11-27 ASSESSMENT — PAIN DESCRIPTION - DESCRIPTORS
DESCRIPTORS: SORE
DESCRIPTORS: SORE
DESCRIPTORS: STABBING
DESCRIPTORS: SHARP
DESCRIPTORS: STABBING

## 2023-11-27 ASSESSMENT — PAIN SCALES - GENERAL
PAINLEVEL_OUTOF10: 8
PAINLEVEL_OUTOF10: 8
PAINLEVEL_OUTOF10: 9
PAINLEVEL_OUTOF10: 8
PAINLEVEL_OUTOF10: 6
PAINLEVEL_OUTOF10: 8
PAINLEVEL_OUTOF10: 6
PAINLEVEL_OUTOF10: 8
PAINLEVEL_OUTOF10: 8

## 2023-11-27 ASSESSMENT — PAIN DESCRIPTION - ONSET
ONSET: ON-GOING

## 2023-11-27 ASSESSMENT — PAIN DESCRIPTION - LOCATION
LOCATION: ABDOMEN

## 2023-11-27 NOTE — PLAN OF CARE
Problem: Discharge Planning  Goal: Discharge to home or other facility with appropriate resources  Outcome: Progressing  Flowsheets (Taken 11/27/2023 0636)  Discharge to home or other facility with appropriate resources:   Identify barriers to discharge with patient and caregiver   Arrange for needed discharge resources and transportation as appropriate   Identify discharge learning needs (meds, wound care, etc)   Arrange for interpreters to assist at discharge as needed     Problem: Pain  Goal: Verbalizes/displays adequate comfort level or baseline comfort level  Outcome: Progressing  Flowsheets (Taken 11/27/2023 0636)  Verbalizes/displays adequate comfort level or baseline comfort level:   Encourage patient to monitor pain and request assistance   Assess pain using appropriate pain scale   Administer analgesics based on type and severity of pain and evaluate response   Implement non-pharmacological measures as appropriate and evaluate response     Problem: ABCDS Injury Assessment  Goal: Absence of physical injury  Outcome: Progressing  Flowsheets (Taken 11/27/2023 0636)  Absence of Physical Injury: Implement safety measures based on patient assessment     Problem: Safety - Adult  Goal: Free from fall injury  Outcome: Progressing  Flowsheets (Taken 11/27/2023 0636)  Free From Fall Injury: Instruct family/caregiver on patient safety     Problem: Skin/Tissue Integrity - Adult  Goal: Skin integrity remains intact  Outcome: Progressing  Flowsheets (Taken 11/27/2023 0636)  Skin Integrity Remains Intact:   Monitor for areas of redness and/or skin breakdown   Assess vascular access sites hourly     Problem: Skin/Tissue Integrity - Adult  Goal: Incisions, wounds, or drain sites healing without S/S of infection  Outcome: Progressing  Flowsheets (Taken 11/27/2023 0636)  Incisions, Wounds, or Drain Sites Healing Without Sign and Symptoms of Infection:   ADMISSION and DAILY: Assess and document risk factors for pressure

## 2023-11-27 NOTE — DISCHARGE INSTRUCTIONS
Visit Discharge/ physician orders:    Supplies   Size   Order #     Coloplast Sensura [place] red flange Cut to fit [id number]   Coloplast Sensura [place] drainable pouch   [id number]   [facility] Skin Barrier Wipes  [id number]   [facility] Ring  [id number]   [facility] Stoma Powder  [id number]   QXKIR IAXVZHH Barrier Strips  [id number]     Change your flange 1-2   times / week. Application of two Piece Colostomy Pouch:  Assemble above supplies in order of application before removing pouch. Cut a hole in the flange to fit the size of your stoma. Remove paper backing. Remove your worn appliance by gently pulling away from skin and discard. Wash skin with warm water, rinse and pat dry. DO NOT USE SOAP or BABY WIPES! Inspect your skin for redness or irritation. If present, apply a small amount of powder to skin around stoma. Brush off excess powder with a Kleenex. Do not use ointments. __X_     [facility] Stoma Powder (for wet, weepy skin)  Apply skin barrier wipe to peristomal skin and skin around wounds. Apply protective ring to inner edge of flange OR to peristomal skin. Center the flange around your stoma. Smooth the adhesive collar to your abdomen. Apply your pouch. Apply barrier extenders around outer edge of flange for added security. Empty when 1/3 full. Follow up with [facility] at 2 and 6 weeks post-op. [address] [address] [address] [facility]  [phone]    Call surgeon or seek emergency medical care if the following symptoms present:  -Black/bright red stool or drainage from stoma  -Black or retracted stoma (ostomy falls into belly)  -No output for more than 48 hours  -Large amount of blood in pouching system   -Stoma output doubled in volume over 24 hours    DO NOT go to ER for pouching system supplies. Call home health for assistance.

## 2023-11-27 NOTE — PLAN OF CARE
Problem: Discharge Planning  Goal: Discharge to home or other facility with appropriate resources  11/27/2023 0837 by Selene Mason RN  Outcome: Progressing     Problem: Pain  Goal: Verbalizes/displays adequate comfort level or baseline comfort level  11/27/2023 0837 by Selene Mason RN  Outcome: Progressing     Problem: ABCDS Injury Assessment  Goal: Absence of physical injury  11/27/2023 0837 by Selene Mason RN  Outcome: Progressing     Problem: Safety - Adult  Goal: Free from fall injury  11/27/2023 0837 by Selene Mason RN  Outcome: Progressing

## 2023-11-27 NOTE — PROGRESS NOTES
Pharmacy Consult for TPN/PPN Initiation    Indication for TPN: Risk for malnutrition   Ordering Provider: Robert Garcia           IV Access: peripheral  Dosing weight: 89 kg  Current insulin regimen: low sliding scale q6h  Maintenance fluid: none    Assessment/ Plan:  Begin 3-in-1 TPN today with the ingredients listed below:    Date 11/27/23   Total kcal/kg 15   Total kcal 1335   Protein g/kg 1.5   Protein g 133.5   Protein kcal (4 kcal/g) 534   Lipid % 30   Lipid g 40   Lipid kcal (10 kcal/g) 400.5   Dextrose g 117.8   Dextrose kcal (3.4 kcal/g) 400.5   Infusion Rate (mL/hr) 115   Na Acetate (mEq) 40   Na Chloride (mEq) 180   Na Phos (mmol)  (3 mmol = 4 mEq Na) 0   Total Na (mEq) 220   K Acetate (mEq) 52   K Chloride (mEq) 0   K Phos (mmol)  (15 mmol = 22 mEq K) 15   Total K (mEq) 74   Ca Gluconate (mEq)  (1 g = 4.65 mEq) 4.65   Mag Sulfate (mEq)  (1 g = 8.12 mEq) 8.12   Multivitamin (mL) 10   Trace Elements (mL) 1     Electrolyte Replacement:   Potassium chloride: 60 mEq    Monitoring:  BMP, Mag, iCal, & Phos ordered for tomorrow with AM labs. Pharmacy will continue to follow daily. Thank you for the consult.   Verna Shelley, PharmD, BCPS 11/27/2023 12:26 PM

## 2023-11-27 NOTE — PROGRESS NOTES
Phosphorus 2.8  Pertinent Meds: Insulin, ATB, Zofran, Pepcid, Potassium Chloride      Current Nutrition Intake & Therapies:    Average Meal Intake: NPO     Diet NPO Exceptions are: Sips of Water with Meds  Current Parenteral Nutrition Orders:  Type and Formula: Premix Peripheral (clinimix 4.25/5 solution; dose weight 89kgm, 10 kcal/kgm)   Lipids: None  Duration: Continuous  Rate/Volume: 110ml/ hour  Current PN Order Provides: 898 kcals, 112 gm protein, 132 gm CHO/24 hours  Goal PN Orders Provides: next bag will provide pt. with 1335 kcals, 133.5 gm protein, 118 gm CHO, 40.1 gm lipids/24 hours    Anthropometric Measures:  Height: 180.3 cm (5' 11\")  Ideal Body Weight (IBW): 172 lbs (78 kg)    Admission Body Weight: 119.3 kg (263 lb) (11/17; no edema)  Current Body Weight: 130.2 kg (287 lb) (11/26 bedscale, no edema),       Current BMI (kg/m2): 40  Usual Body Weight:  (per EMR: 10/4/23 266# 6oz, 11/22/23 274# 2oz)     Weight Adjustment For:  (adjusted body weight: 195# (89kgm) for PN dose weight)                 BMI Categories: Obese Class 2 (BMI 35.0 -39.9)    Estimated Daily Nutrient Needs:  Energy Requirements Based On: Kcal/kg  Weight Used for Energy Requirements: Admission (119kgm on 11/17)  Energy (kcal/day): 2493-5787 kcals (15-18)  Weight Used for Protein Requirements: Ideal (78kgm)  Protein (g/day):  grams (1.2-2)       Nutrition Diagnosis:   Inadequate oral intake related to altered GI function as evidenced by NPO or clear liquid status due to medical condition, nutrition support - parenteral nutrition    Nutrition Interventions:   Food and/or Nutrient Delivery: Continue NPO, Modify Parenteral Nutrition  Nutrition Education/Counseling: Education initiated (11/27 discussed PN with pt; ostomy nutrition handouts provided 11/25; will reinforce when appropriate)  Coordination of Nutrition Care: Continue to monitor while inpatient       Goals:     Goals: Meet at least 75% of estimated needs, by next RD

## 2023-11-27 NOTE — PROGRESS NOTES
Patient is alert and oriented times 4. Patient is in a pleasant mood. Patients speech is clear and follows commands. PERRLA. Patient wears glasses. Capillary refill is less than 3 second bilaterally. Radial and pedal pulse were a 2+ bilaterally. Patient has full sensation in the upper extremities bilaterally and has numbness in the lower extremities bilaterally. Breathing is unlabored. Breath sounds are diminished throughout. Clear S1 and S2 sounds noted. Abdomen in round and tender. There is a midline surgical scar. Patient has active bowel sounds in all four quadrants. Patient reports having nausea. NG tube placed in the right nares, tape is clean, dry and intact. Drainage appearance is green bile. Colostomy present in the left lower quadrant. Stoma is pink and skin is clean, dry and intact. Patient is continent urine is kimmie color with no odor. No edema noted. Skin is pink, warm and dry. Incision present in the abdomen. Ecchymosis scattered. Grade 5 strength present in upper and lower extremities bilaterally. IV present in the left and right forearm. J.P. drain present in left and right lower quadrants.

## 2023-11-27 NOTE — PROGRESS NOTES
[facility] W[facility] Ostomy Continence Nurse  Progress Note       Bala Pardo  AGE: 33 y.o. GENDER: male  : 1995  UNIT: 5K-01/001-A  TODAY'S DATE:  2023  ADMISSION DATE: 2023  7:09 AM  Subjective:     Reason for [facility] Evaluation and Assessment: new colostomy      Bala Pardo is a 33 y.o. male referred by:   [x] Physician/PA/APRN  [] Nursing  [] Other:     Objective:     Tito Risk Score: Tito Scale Score: 15    Assessment:     Encounter: Present to pt room for new colostomy consult. POD #2 Status post exploratory laparotomy with colonic anastomotic resection and end colostomy, per Dr. Stacy Bojorquez. Present to patient room for colostomy consult. Patient lying in bed upon arrival. Removed old pouching system to left upper quadrant. Cleansed stoma with warm wash cloth, pat dry. Stoma red, moist, and protrudes. Peristomal skin pink and intact. Mucocutaneous junction intact with sutures in place. Stoma measured 40-50mm. Coloplast 2 piece red flange cut to fit to size, protective ring applied to inner edge of flange, centered over stoma. Pouch applied. Applied hands lightly over system to activate hydrocolloid. Educated patient step by step throughout pouching system application. Answered questions and concerns. Will plan to change pouching system and educate later this week. Will continue to follow patient. Bed in low, call light in reach. 23    Ostomy type: colostomy  Ostomy location: left upper quadrant  Pouching system removed: post op pouch  Stoma color: red  Stoma size: 40-50mm oval  Stoma description: red,slough/dark to outer edge  Martha stomal skin: intact  Mucocutaneous junction: intact with dark/slough area  Stool color: light brown  Stool consistency: liquid  Stool amount: scant      Seropurulent drainage from 3 oclock mucocutaneous junction area    Plan:     Treatment Recommendations:   Pouching system changes twice weekly. Referral for ostomy clinic placed.     Discharge

## 2023-11-27 NOTE — CARE COORDINATION
11/27/23, 2:36 PM EST    DISCHARGE ON GOING EVALUATION    [facility] day: 10  Location: [facility]01/001-A Reason for admit: Diverticulitis [K57.92]  Acute diverticulitis [K57.92]     Procedure:   11/17 Robotic Sigmoid Colectomy   11/20 NG placed  11/21 DIAGNOSTIC LAPAROSCOPY   11/24 CT A/P: IMPRESSION: Suspect SBO with transition point in the mid central small bowel loops. Numerous scattered pockets of free intraperitoneal air are seen both near the surgical anastomosis and throughout the abdomen which may indicate anastomotic leak. A small pocket of semi organized fluid in the dependent portion of the pelvis mildly enhances peripherally and contains foci of air density which could represent a developing abscess. Trace bilateral pleural effusions and patchy bilateral lower lobe airspace opacities may indicate pneumonia in the appropriate clinical setting. Follow-up to ensure resolution is advised. Diffuse total body wall edema suggesting anasarca is present. Nonspecific bladder wall thickening may be artifactual related to underdistention. Correlate with urinalysis. Barriers to Discharge: General surgery following. NG remains at low intermittent suction. Surgical drains x 2 in place. New colostomy. Clinimix infusing. IV Toradol, Dilaudid and Ativan. IV Zosyn. K+ 3.0, serum Ca+ 7.8. PCP: Jann CANNON  Readmission Risk Score: 21.8%    Patient Goals/Plan/Treatment Preferences: Denia Finn is from home with his parents. Denies needs. Continue to follow.

## 2023-11-27 NOTE — PROGRESS NOTES
Nimo Ji MD  [facility]  General Surgery Postoperative Progress Note    Pt Name: Jaskaran Benedict  Medical Record Number: [id number]  Date of Birth 1995   Today's Date: 11/27/2023    CC:No chief complaint on file. ASSESSMENT   POD # 10 status post robotic assisted sigmoid colectomy and POD#7 status post diagnostic laparoscopy with evaluation of anastomosis and POD# 2 this post exploratory laparotomy with washout and diverting colostomy  HD # 10  PLAN   Still having emesis when he eats or drinks any fluids but still had 2600 out of his NG tube yesterday although only 70 is recorded p.o. Says he is not really drinking that much  So I am going to make him n.p.o. except for sips of water with his meds to see if that stops his emesis because his NG tube is in good position  Bilirubin total has risen from 0.5 on November 4 to 2.2 on November 27 at mostly direct but CT scan done on the 25th showed no evidence of gallbladder disease likely from illness and TPN will follow  WBC 15,000 today on Zosyn we will follow  Mere Lexiishantell is stable. He will says that he has episodes of emesis whenever he drinks anything.   Had very high output NG tube yesterday think he is probably drinking more than his recorded  CURRENT MEDICATIONS   Scheduled Meds:   insulin lispro  0-4 Units SubCUTAneous Q6H    sodium chloride flush  5-40 mL IntraVENous 2 times per day    famotidine  20 mg Oral BID    Or    famotidine (PEPCID) injection  20 mg IntraVENous BID    enoxaparin  30 mg SubCUTAneous BID    ketorolac  15 mg IntraVENous Q6H    piperacillin-tazobactam  4,500 mg IntraVENous Q8H    busPIRone  10 mg Oral BID    fludrocortisone  0.2 mg Oral Daily    metoprolol succinate  25 mg Oral Daily    [Held by provider] midodrine  10 mg Oral BID    propranolol  10 mg Oral BID    risperiDONE  0.5 mg Oral BID    traZODone  150 mg Oral Nightly    venlafaxine  150 mg Oral Nightly     Continuous Infusions:   CLINIMIX

## 2023-11-28 LAB
ANION GAP SERPL CALC-SCNC: 7 MEQ/L (ref 8–16)
BUN SERPL-MCNC: 19 MG/DL (ref 7–22)
CA-I BLD ISE-SCNC: 1.15 MMOL/L (ref 1.12–1.32)
CALCIUM SERPL-MCNC: 7.7 MG/DL (ref 8.5–10.5)
CHLORIDE SERPL-SCNC: 104 MEQ/L (ref 98–111)
CO2 SERPL-SCNC: 26 MEQ/L (ref 23–33)
CREAT SERPL-MCNC: 0.6 MG/DL (ref 0.4–1.2)
GFR SERPL CREATININE-BSD FRML MDRD: > 60 ML/MIN/1.73M2
GLUCOSE BLD STRIP.AUTO-MCNC: 104 MG/DL (ref 70–108)
GLUCOSE BLD STRIP.AUTO-MCNC: 113 MG/DL (ref 70–108)
GLUCOSE SERPL-MCNC: 113 MG/DL (ref 70–108)
MAGNESIUM SERPL-MCNC: 2.2 MG/DL (ref 1.6–2.4)
PHOSPHATE SERPL-MCNC: 3 MG/DL (ref 2.4–4.7)
POTASSIUM SERPL-SCNC: 3.5 MEQ/L (ref 3.5–5.2)
SODIUM SERPL-SCNC: 137 MEQ/L (ref 135–145)

## 2023-11-28 PROCEDURE — 1200000000 HC SEMI PRIVATE

## 2023-11-28 PROCEDURE — 6360000002 HC RX W HCPCS: Performed by: SURGERY

## 2023-11-28 PROCEDURE — 2580000003 HC RX 258: Performed by: SURGERY

## 2023-11-28 PROCEDURE — 99024 POSTOP FOLLOW-UP VISIT: CPT | Performed by: SURGERY

## 2023-11-28 PROCEDURE — 2500000003 HC RX 250 WO HCPCS: Performed by: SURGERY

## 2023-11-28 PROCEDURE — 6370000000 HC RX 637 (ALT 250 FOR IP): Performed by: SURGERY

## 2023-11-28 PROCEDURE — 80048 BASIC METABOLIC PNL TOTAL CA: CPT

## 2023-11-28 PROCEDURE — 84100 ASSAY OF PHOSPHORUS: CPT

## 2023-11-28 PROCEDURE — 82948 REAGENT STRIP/BLOOD GLUCOSE: CPT

## 2023-11-28 PROCEDURE — 82330 ASSAY OF CALCIUM: CPT

## 2023-11-28 PROCEDURE — 6360000002 HC RX W HCPCS: Performed by: NURSE PRACTITIONER

## 2023-11-28 PROCEDURE — 36415 COLL VENOUS BLD VENIPUNCTURE: CPT

## 2023-11-28 PROCEDURE — 83735 ASSAY OF MAGNESIUM: CPT

## 2023-11-28 PROCEDURE — 2580000003 HC RX 258: Performed by: NURSE PRACTITIONER

## 2023-11-28 RX ADMIN — PIPERACILLIN AND TAZOBACTAM 4500 MG: 4; .5 INJECTION, POWDER, FOR SOLUTION INTRAVENOUS at 03:09

## 2023-11-28 RX ADMIN — FAMOTIDINE 20 MG: 20 TABLET ORAL at 08:19

## 2023-11-28 RX ADMIN — LORAZEPAM 0.5 MG: 2 INJECTION INTRAMUSCULAR; INTRAVENOUS at 09:17

## 2023-11-28 RX ADMIN — HYDROMORPHONE HYDROCHLORIDE 0.5 MG: 1 INJECTION, SOLUTION INTRAMUSCULAR; INTRAVENOUS; SUBCUTANEOUS at 02:59

## 2023-11-28 RX ADMIN — HYOSCYAMINE SULFATE 125 MCG: 0.12 TABLET, ORALLY DISINTEGRATING ORAL at 17:55

## 2023-11-28 RX ADMIN — KETOROLAC TROMETHAMINE 15 MG: 30 INJECTION, SOLUTION INTRAMUSCULAR; INTRAVENOUS at 13:39

## 2023-11-28 RX ADMIN — FLUDROCORTISONE ACETATE 0.2 MG: 0.1 TABLET ORAL at 08:20

## 2023-11-28 RX ADMIN — HYDROMORPHONE HYDROCHLORIDE 1 MG: 1 INJECTION, SOLUTION INTRAMUSCULAR; INTRAVENOUS; SUBCUTANEOUS at 21:02

## 2023-11-28 RX ADMIN — KETOROLAC TROMETHAMINE 15 MG: 30 INJECTION, SOLUTION INTRAMUSCULAR; INTRAVENOUS at 01:14

## 2023-11-28 RX ADMIN — KETOROLAC TROMETHAMINE 15 MG: 30 INJECTION, SOLUTION INTRAMUSCULAR; INTRAVENOUS at 08:19

## 2023-11-28 RX ADMIN — PROPRANOLOL HYDROCHLORIDE 10 MG: 10 TABLET ORAL at 20:46

## 2023-11-28 RX ADMIN — HYOSCYAMINE SULFATE 125 MCG: 0.12 TABLET, ORALLY DISINTEGRATING ORAL at 08:19

## 2023-11-28 RX ADMIN — SODIUM CHLORIDE, PRESERVATIVE FREE 10 ML: 5 INJECTION INTRAVENOUS at 08:24

## 2023-11-28 RX ADMIN — FAMOTIDINE 20 MG: 20 TABLET ORAL at 20:46

## 2023-11-28 RX ADMIN — METOPROLOL SUCCINATE 25 MG: 25 TABLET, EXTENDED RELEASE ORAL at 08:21

## 2023-11-28 RX ADMIN — BUSPIRONE HYDROCHLORIDE 10 MG: 10 TABLET ORAL at 20:46

## 2023-11-28 RX ADMIN — ENOXAPARIN SODIUM 30 MG: 100 INJECTION SUBCUTANEOUS at 08:19

## 2023-11-28 RX ADMIN — KETOROLAC TROMETHAMINE 15 MG: 30 INJECTION, SOLUTION INTRAMUSCULAR; INTRAVENOUS at 20:47

## 2023-11-28 RX ADMIN — PROPRANOLOL HYDROCHLORIDE 10 MG: 10 TABLET ORAL at 08:21

## 2023-11-28 RX ADMIN — HYDROMORPHONE HYDROCHLORIDE 1 MG: 1 INJECTION, SOLUTION INTRAMUSCULAR; INTRAVENOUS; SUBCUTANEOUS at 13:38

## 2023-11-28 RX ADMIN — PIPERACILLIN AND TAZOBACTAM 4500 MG: 4; .5 INJECTION, POWDER, FOR SOLUTION INTRAVENOUS at 20:11

## 2023-11-28 RX ADMIN — ANTACID TABLETS 500 MG: 500 TABLET, CHEWABLE ORAL at 13:39

## 2023-11-28 RX ADMIN — TRAZODONE HYDROCHLORIDE 150 MG: 50 TABLET ORAL at 20:46

## 2023-11-28 RX ADMIN — HYDROMORPHONE HYDROCHLORIDE 0.5 MG: 1 INJECTION, SOLUTION INTRAMUSCULAR; INTRAVENOUS; SUBCUTANEOUS at 05:59

## 2023-11-28 RX ADMIN — BUSPIRONE HYDROCHLORIDE 10 MG: 10 TABLET ORAL at 08:20

## 2023-11-28 RX ADMIN — HYDROMORPHONE HYDROCHLORIDE 1 MG: 1 INJECTION, SOLUTION INTRAMUSCULAR; INTRAVENOUS; SUBCUTANEOUS at 09:11

## 2023-11-28 RX ADMIN — ONDANSETRON 4 MG: 2 INJECTION INTRAMUSCULAR; INTRAVENOUS at 20:10

## 2023-11-28 RX ADMIN — RISPERIDONE 0.5 MG: 0.25 TABLET, FILM COATED ORAL at 20:46

## 2023-11-28 RX ADMIN — HYDROMORPHONE HYDROCHLORIDE 1 MG: 1 INJECTION, SOLUTION INTRAMUSCULAR; INTRAVENOUS; SUBCUTANEOUS at 17:55

## 2023-11-28 RX ADMIN — LORAZEPAM 0.5 MG: 2 INJECTION INTRAMUSCULAR; INTRAVENOUS at 23:28

## 2023-11-28 RX ADMIN — ENOXAPARIN SODIUM 30 MG: 100 INJECTION SUBCUTANEOUS at 20:46

## 2023-11-28 RX ADMIN — CALCIUM GLUCONATE: 98 INJECTION, SOLUTION INTRAVENOUS at 19:54

## 2023-11-28 RX ADMIN — RISPERIDONE 0.5 MG: 0.25 TABLET, FILM COATED ORAL at 08:21

## 2023-11-28 RX ADMIN — PIPERACILLIN AND TAZOBACTAM 4500 MG: 4; .5 INJECTION, POWDER, FOR SOLUTION INTRAVENOUS at 13:06

## 2023-11-28 RX ADMIN — LORAZEPAM 0.5 MG: 2 INJECTION INTRAMUSCULAR; INTRAVENOUS at 15:21

## 2023-11-28 RX ADMIN — SODIUM CHLORIDE, PRESERVATIVE FREE 10 ML: 5 INJECTION INTRAVENOUS at 20:47

## 2023-11-28 RX ADMIN — VENLAFAXINE HYDROCHLORIDE 150 MG: 150 CAPSULE, EXTENDED RELEASE ORAL at 20:46

## 2023-11-28 RX ADMIN — LORAZEPAM 0.5 MG: 2 INJECTION INTRAMUSCULAR; INTRAVENOUS at 01:14

## 2023-11-28 ASSESSMENT — PAIN SCALES - GENERAL
PAINLEVEL_OUTOF10: 7
PAINLEVEL_OUTOF10: 5
PAINLEVEL_OUTOF10: 5
PAINLEVEL_OUTOF10: 8
PAINLEVEL_OUTOF10: 8
PAINLEVEL_OUTOF10: 7
PAINLEVEL_OUTOF10: 8

## 2023-11-28 ASSESSMENT — PAIN DESCRIPTION - LOCATION
LOCATION: ABDOMEN

## 2023-11-28 ASSESSMENT — PAIN DESCRIPTION - FREQUENCY
FREQUENCY: CONTINUOUS
FREQUENCY: CONTINUOUS

## 2023-11-28 ASSESSMENT — PAIN DESCRIPTION - DESCRIPTORS
DESCRIPTORS: THROBBING
DESCRIPTORS: THROBBING
DESCRIPTORS: THROBBING;CRAMPING
DESCRIPTORS: THROBBING;SPASM
DESCRIPTORS: THROBBING

## 2023-11-28 ASSESSMENT — PAIN DESCRIPTION - ONSET
ONSET: ON-GOING
ONSET: ON-GOING

## 2023-11-28 ASSESSMENT — PAIN DESCRIPTION - ORIENTATION
ORIENTATION: ANTERIOR

## 2023-11-28 ASSESSMENT — PAIN DESCRIPTION - PAIN TYPE
TYPE: SURGICAL PAIN
TYPE: SURGICAL PAIN

## 2023-11-28 NOTE — PLAN OF CARE
Problem: Discharge Planning  Goal: Discharge to home or other facility with appropriate resources  Outcome: Progressing   Discharge plan is home. Problem: Pain  Goal: Verbalizes/displays adequate comfort level or baseline comfort level  Outcome: Progressing  Flowsheets (Taken 11/28/2023 0819)  Verbalizes/displays adequate comfort level or baseline comfort level:   Encourage patient to monitor pain and request assistance   Assess pain using appropriate pain scale   Administer analgesics based on type and severity of pain and evaluate response   Implement non-pharmacological measures as appropriate and evaluate response   Pain Assessment: 0-10  Pain Level: 7   Patient's Stated Pain Goal: 4   Is pain goal met at this time? No     Non-Pharmaceutical Pain Intervention(s): Cold pack, Ambulation/Increased Activity, Distraction, Rest, Repositioned    Problem: ABCDS Injury Assessment  Goal: Absence of physical injury  Outcome: Progressing   Patient remains free from injury. Safety precautions remain in place. Problem: Safety - Adult  Goal: Free from fall injury  Outcome: Progressing  Fall assessment completed. Patient using call light appropriately to call for assistance with ambulation to bathroom. Personal items within reach. Patient is also compliant with use of non-skid slippers. Problem: Skin/Tissue Integrity - Adult  Goal: Skin integrity remains intact  Outcome: Progressing  Flowsheets (Taken 11/28/2023 0819)  Skin Integrity Remains Intact: Monitor for areas of redness and/or skin breakdown   Patient remain free from new skin breakdown.     Problem: Skin/Tissue Integrity - Adult  Goal: Incisions, wounds, or drain sites healing without S/S of infection  Outcome: Progressing  Flowsheets (Taken 11/28/2023 0819)  Incisions, Wounds, or Drain Sites Healing Without Sign and Symptoms of Infection: TWICE DAILY: Assess and document dressing/incision, wound bed, drain sites and surrounding tissue   Surgical dressing verbalize anxieties, fears, and concerns, and demonstrate effective coping: Assist patient/family to identify coping skills, available support systems and cultural and spiritual values   Patient's mom here. Problem: Nutrition Deficit:  Goal: Optimize nutritional status  11/28/2023 1648 by Chely Watts RN  Outcome: Progressing  Patient on PPN. Care plan reviewed with patient and mother. Patient and mother verbalize understanding of the plan of care and contribute to goal setting.

## 2023-11-28 NOTE — PROGRESS NOTES
Radhika Rodriguez MD  [facility]  General Surgery Postoperative Progress Note    Pt Name: Tasneem Cazares  Medical Record Number: [id number]  Date of Birth 1995   Today's Date: 11/28/2023    CC:No chief complaint on file. ASSESSMENT   POD # 111 status post robotic assisted sigmoid colectomy and POD#8 status post diagnostic laparoscopy with evaluation of anastomosis and POD# 3 this post exploratory laparotomy with washout and diverting colostomy  HD # 11  PLAN   Feels better-boyd today  NG tube output dropped off a lot despite the order for 6 sips with meds only he has been getting cups of ice he had 2 large cups yesterday which would be at least 600 cc and his total output was only 1300 so organ to try NG tube clamping today  Bilirubin total has risen from 0.5 on November 4 to 2.2 on November 27 at mostly direct but CT scan done on the 25th showed no evidence of gallbladder disease likely from illness and TPN will follow  WBC 15,000 yesterday on Zosyn we will follow. He has had no fever still has purulent drainage out of the right lower quadrant Jean Claude drain. Jose Alfredo Argue is stable. He says he feels better-boyd today. No more nausea or vomiting is just his gag reflex and this tube in the back of his throat.   CURRENT MEDICATIONS   Scheduled Meds:   insulin lispro  0-4 Units SubCUTAneous Q6H    sodium chloride flush  5-40 mL IntraVENous 2 times per day    famotidine  20 mg Oral BID    Or    famotidine (PEPCID) injection  20 mg IntraVENous BID    enoxaparin  30 mg SubCUTAneous BID    ketorolac  15 mg IntraVENous Q6H    piperacillin-tazobactam  4,500 mg IntraVENous Q8H    busPIRone  10 mg Oral BID    fludrocortisone  0.2 mg Oral Daily    metoprolol succinate  25 mg Oral Daily    [Held by provider] midodrine  10 mg Oral BID    propranolol  10 mg Oral BID    risperiDONE  0.5 mg Oral BID    traZODone  150 mg Oral Nightly    venlafaxine  150 mg Oral Nightly     Continuous Infusions:

## 2023-11-28 NOTE — PROGRESS NOTES
Comprehensive Nutrition Assessment    Type and Reason for Visit:  Reassess (TPN recommendations and management)    Nutrition Recommendations/Plan:   Recommend central line placement for TPN. Discussed with pharmacy the following recommendations for TPN: 20 kcals/kg, 1.7 g/kg protein, 30% lipid kcals with dosing weight 89 kg~ pharmacy stated will be able to provide safely with osmolarity. Monitoring and will adjust TPN as able pending labs and line access. Monitoring all nutrition aspects and will provide further recommendations as necessary and appropriate. Malnutrition Assessment:  Malnutrition Status: At risk for malnutrition (Comment) (11/27/23 8718)    Context:  Acute Illness     Findings of the 6 clinical characteristics of malnutrition:  Energy Intake:  50% or less of estimated energy requirements for 5 or more days  Weight Loss:  No significant weight loss     Body Fat Loss:  No significant body fat loss     Muscle Mass Loss:  No significant muscle mass loss    Fluid Accumulation:  Unable to assess     Strength:  Not Performed    Nutrition Assessment:     At risk for nutrition compromise r/t altered GI function - admit with diverticulitis, 11/17/23 sigmoid colectomy, 11/21 diagnostic laparoscopy, 11/24 colonic anastomotic resection and end colostomy, NPO status (TPN initiated 11/25) and underlying medical condition (POTS, PTSD, HTN, vapes nicotine). Nutrition Related Findings:    Pt. Report/Treatments/Miscellaneous: Pt seen at bedside~ NGT remains to suction at this time and continues with output. Pt denies questions regarding TPN at this time. No PICC line~ would recommend to allow for further nutrition to best meet needs safely and appropriately.    GI Status: 700 ml NGT output, colostomy 150 ml, pt reports significant nausea   Pertinent Labs: Na 137, K 3.5, BUN 19, creatinine 0.6, glucose 113, phos 3  Pertinent Meds: pepcid, risperdal, zofran, ativan     Wound Type: Surgical Incision (abdomen incision: 11/17 sigmoid colectomy; 11/24 colonic anastomotic resection and end colostomy)       Current Nutrition Intake & Therapies:    Average Meal Intake: NPO     Diet NPO Exceptions are: Sips of Water with Meds  PN-Adult 3 IN 1 Peripheral Line (Custom)  PN-Adult 3 IN 1 Peripheral Line (Custom)  Current Parenteral Nutrition Orders:  Type and Formula: 3-in-1 Peripheral   Lipids: Daily  Duration: Continuous  Rate/Volume: 115 ml/hr  Current PN Order Provides: 1335 kcals, 133.5 g protein, 118 g CHO, 40 g lipids per 24 hours  Next bag recommendations: 20 kcals/kg, 1.7 g/kg protein, 30% lipid kcals with dosing weight 89 kg to provide: 1780 kcals, 188.5 g CHO, 151 g protein, 53.4 g lipids per 24 hours.     Anthropometric Measures:  Height: 180.3 cm (5' 11\")  Ideal Body Weight (IBW): 172 lbs (78 kg)    Admission Body Weight: 119.3 kg (263 lb) (11/17; no edema)  Current Body Weight: 130.2 kg (287 lb) (11/26 bedscale, no edema),     Current BMI (kg/m2): 40  Usual Body Weight:  (per EMR: 10/4/23 266# 6oz, 11/22/23 274# 2oz)     Weight Adjustment For:  (adjusted body weight: 195# (89kgm) for PN dose weight)                 BMI Categories: Obese Class 3 (BMI 40.0 or greater)    Estimated Daily Nutrient Needs:  Energy Requirements Based On: Kcal/kg  Weight Used for Energy Requirements: Admission (119kgm on 11/17)  Energy (kcal/day): 5672-9957 kcals (15-18)  Weight Used for Protein Requirements: Ideal (78kgm)  Protein (g/day):  grams (1.2-2)      Nutrition Diagnosis:   Inadequate oral intake related to altered GI function as evidenced by NPO or clear liquid status due to medical condition, nutrition support - parenteral nutrition    Nutrition Interventions:   Food and/or Nutrient Delivery: Continue NPO, Modify Parenteral Nutrition  Nutrition Education/Counseling: Education initiated (11/28 discussed PN with pt; ostomy nutrition handouts provided 11/25; will reinforce when appropriate)  Coordination of Nutrition Care:

## 2023-11-28 NOTE — PROGRESS NOTES
Pharmacy Consult for TPN/PPN Monitoring & Adjustment  IV Access: peripheral      Dosing weight: 89 kg  Current insulin regimen: low sliding scale  Diet (excluding TPN): NPO  Maintenance fluid: none    Plan:  See components of tonight's TPN bag in the table below:  Begin 3-in-1 TPN today with the ingredients listed below:     Date 11/27/23 11/28/23   Total kcal/kg 15 20   Total kcal 1335 1780   Protein g/kg 1.5 1.7   Protein g 133.5 151.3   Protein kcal (4 kcal/g) 534 605.2   Lipid % 30 30   Lipid g 40 53.4   Lipid kcal (10 kcal/g) 400.5 534   Dextrose g 117.8 188.5   Dextrose kcal (3.4 kcal/g) 400.5 640.8   Infusion Rate (mL/hr) 115 115   Na Acetate (mEq) 40 40   Na Chloride (mEq) 180 180   Na Phos (mmol)  (3 mmol = 4 mEq Na) 0 0   Total Na (mEq) 220 220   K Acetate (mEq) 52 52   K Chloride (mEq) 0 0   K Phos (mmol)  (15 mmol = 22 mEq K) 15 15   Total K (mEq) 74 74   Ca Gluconate (mEq)  (1 g = 4.65 mEq) 4.65 4.65   Mag Sulfate (mEq)  (1 g = 8.12 mEq) 8.12 8.12   Multivitamin (mL) 10 10   Trace Elements (mL) 1 1     Summary of changes for tonight's TPN: no micronutrient changes; Macronutrient changes per dietitian:   Increase calories to 20 kcal/kg  Increase protein to 1.7 g/kg  Keep lipids at 30% of total calories    Electrolyte Replacement:   None    Monitoring:  BMP, Mag, iCal, & Phos ordered for tomorrow with AM labs. Patient is receiving TPN via a peripheral line which has limits in the number of calories that can be delivered; a central line will allow administration of more calories and a more concentrated preparation  Pharmacy will continue to follow daily. Thank you for the consult. Osvaldo Demarco 11/28/2023 10:16 AM

## 2023-11-29 ENCOUNTER — APPOINTMENT (OUTPATIENT)
Dept: GENERAL RADIOLOGY | Age: 28
DRG: 231 | End: 2023-11-29
Attending: SURGERY
Payer: COMMERCIAL

## 2023-11-29 LAB
ALBUMIN SERPL BCG-MCNC: 1.9 G/DL (ref 3.5–5.1)
ALP SERPL-CCNC: 45 U/L (ref 38–126)
ALT SERPL W/O P-5'-P-CCNC: 28 U/L (ref 11–66)
ANION GAP SERPL CALC-SCNC: 9 MEQ/L (ref 8–16)
AST SERPL-CCNC: 37 U/L (ref 5–40)
AUTO DIFF PNL BLD: ABNORMAL
BACTERIA BLD AEROBE CULT: NORMAL
BACTERIA BLD AEROBE CULT: NORMAL
BASOPHILS ABSOLUTE: 0.1 THOU/MM3 (ref 0–0.1)
BASOPHILS NFR BLD AUTO: 0.4 %
BILIRUB CONJ SERPL-MCNC: 2.1 MG/DL (ref 0–0.3)
BILIRUB SERPL-MCNC: 2.4 MG/DL (ref 0.3–1.2)
BUN SERPL-MCNC: 17 MG/DL (ref 7–22)
CALCIUM SERPL-MCNC: 7.6 MG/DL (ref 8.5–10.5)
CHLORIDE SERPL-SCNC: 100 MEQ/L (ref 98–111)
CO2 SERPL-SCNC: 26 MEQ/L (ref 23–33)
CREAT SERPL-MCNC: 0.6 MG/DL (ref 0.4–1.2)
DEPRECATED RDW RBC AUTO: 45.4 FL (ref 35–45)
EOSINOPHIL NFR BLD AUTO: 0.5 %
EOSINOPHILS ABSOLUTE: 0.1 THOU/MM3 (ref 0–0.4)
ERYTHROCYTE [DISTWIDTH] IN BLOOD BY AUTOMATED COUNT: 14.2 % (ref 11.5–14.5)
GFR SERPL CREATININE-BSD FRML MDRD: > 60 ML/MIN/1.73M2
GLUCOSE BLD STRIP.AUTO-MCNC: 117 MG/DL (ref 70–108)
GLUCOSE BLD STRIP.AUTO-MCNC: 121 MG/DL (ref 70–108)
GLUCOSE BLD STRIP.AUTO-MCNC: 128 MG/DL (ref 70–108)
GLUCOSE SERPL-MCNC: 117 MG/DL (ref 70–108)
HCT VFR BLD AUTO: 24.1 % (ref 42–52)
HGB BLD-MCNC: 8 GM/DL (ref 14–18)
IMM GRANULOCYTES # BLD AUTO: 1.15 THOU/MM3 (ref 0–0.07)
IMM GRANULOCYTES NFR BLD AUTO: 8.6 %
LYMPHOCYTES ABSOLUTE: 1.6 THOU/MM3 (ref 1–4.8)
LYMPHOCYTES NFR BLD AUTO: 12 %
MAGNESIUM SERPL-MCNC: 2.2 MG/DL (ref 1.6–2.4)
MCH RBC QN AUTO: 29.3 PG (ref 26–33)
MCHC RBC AUTO-ENTMCNC: 33.2 GM/DL (ref 32.2–35.5)
MCV RBC AUTO: 88.3 FL (ref 80–94)
MONOCYTES ABSOLUTE: 1.1 THOU/MM3 (ref 0.4–1.3)
MONOCYTES NFR BLD AUTO: 8 %
NEUTROPHILS NFR BLD AUTO: 70.5 %
NRBC BLD AUTO-RTO: 1 /100 WBC
PATHOLOGIST REVIEW: ABNORMAL
PHOSPHATE SERPL-MCNC: 2.8 MG/DL (ref 2.4–4.7)
PLATELET # BLD AUTO: 206 THOU/MM3 (ref 130–400)
PLATELET BLD QL SMEAR: ADEQUATE
PMV BLD AUTO: 11.9 FL (ref 9.4–12.4)
POIKILOCYTES: ABNORMAL
POTASSIUM SERPL-SCNC: 3.4 MEQ/L (ref 3.5–5.2)
PROT SERPL-MCNC: 5.4 G/DL (ref 6.1–8)
RBC # BLD AUTO: 2.73 MILL/MM3 (ref 4.7–6.1)
ROULEAUX BLD QL SMEAR: SLIGHT
SCAN OF BLOOD SMEAR: NORMAL
SEGMENTED NEUTROPHILS ABSOLUTE COUNT: 9.4 THOU/MM3 (ref 1.8–7.7)
SODIUM SERPL-SCNC: 135 MEQ/L (ref 135–145)
VARIANT LYMPHS BLD QL SMEAR: ABNORMAL %
WBC # BLD AUTO: 13.3 THOU/MM3 (ref 4.8–10.8)

## 2023-11-29 PROCEDURE — 6360000002 HC RX W HCPCS: Performed by: SURGERY

## 2023-11-29 PROCEDURE — 6360000002 HC RX W HCPCS: Performed by: NURSE PRACTITIONER

## 2023-11-29 PROCEDURE — 85025 COMPLETE CBC W/AUTO DIFF WBC: CPT

## 2023-11-29 PROCEDURE — 99024 POSTOP FOLLOW-UP VISIT: CPT | Performed by: SURGERY

## 2023-11-29 PROCEDURE — 36569 INSJ PICC 5 YR+ W/O IMAGING: CPT

## 2023-11-29 PROCEDURE — 83735 ASSAY OF MAGNESIUM: CPT

## 2023-11-29 PROCEDURE — 2500000003 HC RX 250 WO HCPCS: Performed by: PHARMACIST

## 2023-11-29 PROCEDURE — 76937 US GUIDE VASCULAR ACCESS: CPT

## 2023-11-29 PROCEDURE — 2580000003 HC RX 258: Performed by: SURGERY

## 2023-11-29 PROCEDURE — 02HV33Z INSERTION OF INFUSION DEVICE INTO SUPERIOR VENA CAVA, PERCUTANEOUS APPROACH: ICD-10-PCS | Performed by: RADIOLOGY

## 2023-11-29 PROCEDURE — 2580000003 HC RX 258: Performed by: PHARMACIST

## 2023-11-29 PROCEDURE — 80053 COMPREHEN METABOLIC PANEL: CPT

## 2023-11-29 PROCEDURE — 1200000000 HC SEMI PRIVATE

## 2023-11-29 PROCEDURE — 82948 REAGENT STRIP/BLOOD GLUCOSE: CPT

## 2023-11-29 PROCEDURE — 2580000003 HC RX 258: Performed by: NURSE PRACTITIONER

## 2023-11-29 PROCEDURE — 6360000002 HC RX W HCPCS: Performed by: PHARMACIST

## 2023-11-29 PROCEDURE — 84100 ASSAY OF PHOSPHORUS: CPT

## 2023-11-29 PROCEDURE — 2500000003 HC RX 250 WO HCPCS: Performed by: SURGERY

## 2023-11-29 PROCEDURE — 6370000000 HC RX 637 (ALT 250 FOR IP): Performed by: SURGERY

## 2023-11-29 PROCEDURE — C1751 CATH, INF, PER/CENT/MIDLINE: HCPCS

## 2023-11-29 PROCEDURE — 82248 BILIRUBIN DIRECT: CPT

## 2023-11-29 PROCEDURE — 74018 RADEX ABDOMEN 1 VIEW: CPT

## 2023-11-29 PROCEDURE — 36415 COLL VENOUS BLD VENIPUNCTURE: CPT

## 2023-11-29 PROCEDURE — A4216 STERILE WATER/SALINE, 10 ML: HCPCS | Performed by: SURGERY

## 2023-11-29 RX ORDER — POTASSIUM CHLORIDE 29.8 MG/ML
20 INJECTION INTRAVENOUS
Status: COMPLETED | OUTPATIENT
Start: 2023-11-29 | End: 2023-11-29

## 2023-11-29 RX ORDER — SODIUM CHLORIDE 0.9 % (FLUSH) 0.9 %
5-40 SYRINGE (ML) INJECTION PRN
Status: DISCONTINUED | OUTPATIENT
Start: 2023-11-29 | End: 2023-12-07 | Stop reason: HOSPADM

## 2023-11-29 RX ORDER — LIDOCAINE HYDROCHLORIDE 10 MG/ML
5 INJECTION, SOLUTION EPIDURAL; INFILTRATION; INTRACAUDAL; PERINEURAL ONCE
Status: DISCONTINUED | OUTPATIENT
Start: 2023-11-29 | End: 2023-12-07 | Stop reason: HOSPADM

## 2023-11-29 RX ORDER — FUROSEMIDE 10 MG/ML
20 INJECTION INTRAMUSCULAR; INTRAVENOUS ONCE
Status: COMPLETED | OUTPATIENT
Start: 2023-11-29 | End: 2023-11-29

## 2023-11-29 RX ORDER — POTASSIUM CHLORIDE 7.45 MG/ML
10 INJECTION INTRAVENOUS PRN
Status: DISCONTINUED | OUTPATIENT
Start: 2023-11-29 | End: 2023-12-07 | Stop reason: HOSPADM

## 2023-11-29 RX ORDER — POTASSIUM CHLORIDE 29.8 MG/ML
20 INJECTION INTRAVENOUS PRN
Status: DISCONTINUED | OUTPATIENT
Start: 2023-11-29 | End: 2023-12-07 | Stop reason: HOSPADM

## 2023-11-29 RX ORDER — SODIUM CHLORIDE 9 MG/ML
25 INJECTION, SOLUTION INTRAVENOUS PRN
Status: DISCONTINUED | OUTPATIENT
Start: 2023-11-29 | End: 2023-12-07 | Stop reason: HOSPADM

## 2023-11-29 RX ORDER — SODIUM CHLORIDE 0.9 % (FLUSH) 0.9 %
5-40 SYRINGE (ML) INJECTION EVERY 12 HOURS SCHEDULED
Status: DISCONTINUED | OUTPATIENT
Start: 2023-11-29 | End: 2023-12-07 | Stop reason: HOSPADM

## 2023-11-29 RX ADMIN — SODIUM CHLORIDE, PRESERVATIVE FREE 10 ML: 5 INJECTION INTRAVENOUS at 22:06

## 2023-11-29 RX ADMIN — ONDANSETRON 4 MG: 2 INJECTION INTRAMUSCULAR; INTRAVENOUS at 21:48

## 2023-11-29 RX ADMIN — VENLAFAXINE HYDROCHLORIDE 150 MG: 150 CAPSULE, EXTENDED RELEASE ORAL at 22:05

## 2023-11-29 RX ADMIN — LORAZEPAM 0.5 MG: 2 INJECTION INTRAMUSCULAR; INTRAVENOUS at 21:48

## 2023-11-29 RX ADMIN — SODIUM CHLORIDE, PRESERVATIVE FREE 10 ML: 5 INJECTION INTRAVENOUS at 07:47

## 2023-11-29 RX ADMIN — TRAZODONE HYDROCHLORIDE 150 MG: 50 TABLET ORAL at 22:05

## 2023-11-29 RX ADMIN — POTASSIUM CHLORIDE 20 MEQ: 29.8 INJECTION, SOLUTION INTRAVENOUS at 17:02

## 2023-11-29 RX ADMIN — PROPRANOLOL HYDROCHLORIDE 10 MG: 10 TABLET ORAL at 22:05

## 2023-11-29 RX ADMIN — KETOROLAC TROMETHAMINE 15 MG: 30 INJECTION, SOLUTION INTRAMUSCULAR; INTRAVENOUS at 14:19

## 2023-11-29 RX ADMIN — HYDROMORPHONE HYDROCHLORIDE 1 MG: 1 INJECTION, SOLUTION INTRAMUSCULAR; INTRAVENOUS; SUBCUTANEOUS at 01:29

## 2023-11-29 RX ADMIN — ENOXAPARIN SODIUM 30 MG: 100 INJECTION SUBCUTANEOUS at 07:29

## 2023-11-29 RX ADMIN — POTASSIUM CHLORIDE 20 MEQ: 29.8 INJECTION, SOLUTION INTRAVENOUS at 18:14

## 2023-11-29 RX ADMIN — HYDROMORPHONE HYDROCHLORIDE 1 MG: 1 INJECTION, SOLUTION INTRAMUSCULAR; INTRAVENOUS; SUBCUTANEOUS at 15:04

## 2023-11-29 RX ADMIN — FUROSEMIDE 20 MG: 10 INJECTION, SOLUTION INTRAMUSCULAR; INTRAVENOUS at 12:50

## 2023-11-29 RX ADMIN — HYDROMORPHONE HYDROCHLORIDE 1 MG: 1 INJECTION, SOLUTION INTRAMUSCULAR; INTRAVENOUS; SUBCUTANEOUS at 18:15

## 2023-11-29 RX ADMIN — PIPERACILLIN AND TAZOBACTAM 4500 MG: 4; .5 INJECTION, POWDER, FOR SOLUTION INTRAVENOUS at 11:15

## 2023-11-29 RX ADMIN — KETOROLAC TROMETHAMINE 15 MG: 30 INJECTION, SOLUTION INTRAMUSCULAR; INTRAVENOUS at 07:39

## 2023-11-29 RX ADMIN — HYDROMORPHONE HYDROCHLORIDE 1 MG: 1 INJECTION, SOLUTION INTRAMUSCULAR; INTRAVENOUS; SUBCUTANEOUS at 11:55

## 2023-11-29 RX ADMIN — RISPERIDONE 0.5 MG: 0.25 TABLET, FILM COATED ORAL at 22:05

## 2023-11-29 RX ADMIN — CALCIUM GLUCONATE: 98 INJECTION, SOLUTION INTRAVENOUS at 18:23

## 2023-11-29 RX ADMIN — KETOROLAC TROMETHAMINE 15 MG: 30 INJECTION, SOLUTION INTRAMUSCULAR; INTRAVENOUS at 20:08

## 2023-11-29 RX ADMIN — SODIUM CHLORIDE, PRESERVATIVE FREE 20 MG: 5 INJECTION INTRAVENOUS at 20:08

## 2023-11-29 RX ADMIN — LORAZEPAM 0.5 MG: 2 INJECTION INTRAMUSCULAR; INTRAVENOUS at 09:43

## 2023-11-29 RX ADMIN — SODIUM CHLORIDE, PRESERVATIVE FREE 20 MG: 5 INJECTION INTRAVENOUS at 07:29

## 2023-11-29 RX ADMIN — PIPERACILLIN AND TAZOBACTAM 4500 MG: 4; .5 INJECTION, POWDER, FOR SOLUTION INTRAVENOUS at 03:16

## 2023-11-29 RX ADMIN — HYDROMORPHONE HYDROCHLORIDE 1 MG: 1 INJECTION, SOLUTION INTRAMUSCULAR; INTRAVENOUS; SUBCUTANEOUS at 08:35

## 2023-11-29 RX ADMIN — KETOROLAC TROMETHAMINE 15 MG: 30 INJECTION, SOLUTION INTRAMUSCULAR; INTRAVENOUS at 03:23

## 2023-11-29 RX ADMIN — HYDROMORPHONE HYDROCHLORIDE 1 MG: 1 INJECTION, SOLUTION INTRAMUSCULAR; INTRAVENOUS; SUBCUTANEOUS at 05:39

## 2023-11-29 RX ADMIN — ENOXAPARIN SODIUM 30 MG: 100 INJECTION SUBCUTANEOUS at 21:47

## 2023-11-29 RX ADMIN — PIPERACILLIN AND TAZOBACTAM 4500 MG: 4; .5 INJECTION, POWDER, FOR SOLUTION INTRAVENOUS at 18:53

## 2023-11-29 RX ADMIN — BUSPIRONE HYDROCHLORIDE 10 MG: 10 TABLET ORAL at 22:05

## 2023-11-29 RX ADMIN — SODIUM CHLORIDE, PRESERVATIVE FREE 10 ML: 5 INJECTION INTRAVENOUS at 20:11

## 2023-11-29 RX ADMIN — HYDROMORPHONE HYDROCHLORIDE 1 MG: 1 INJECTION, SOLUTION INTRAMUSCULAR; INTRAVENOUS; SUBCUTANEOUS at 21:48

## 2023-11-29 ASSESSMENT — PAIN DESCRIPTION - ORIENTATION
ORIENTATION: ANTERIOR
ORIENTATION: MID
ORIENTATION: ANTERIOR
ORIENTATION: ANTERIOR;MID

## 2023-11-29 ASSESSMENT — PAIN DESCRIPTION - PAIN TYPE
TYPE: SURGICAL PAIN

## 2023-11-29 ASSESSMENT — PAIN DESCRIPTION - FREQUENCY
FREQUENCY: CONTINUOUS

## 2023-11-29 ASSESSMENT — PAIN DESCRIPTION - ONSET
ONSET: ON-GOING

## 2023-11-29 ASSESSMENT — PAIN DESCRIPTION - LOCATION
LOCATION: ABDOMEN

## 2023-11-29 ASSESSMENT — PAIN SCALES - GENERAL
PAINLEVEL_OUTOF10: 7
PAINLEVEL_OUTOF10: 4
PAINLEVEL_OUTOF10: 8
PAINLEVEL_OUTOF10: 4
PAINLEVEL_OUTOF10: 7
PAINLEVEL_OUTOF10: 7
PAINLEVEL_OUTOF10: 8
PAINLEVEL_OUTOF10: 5
PAINLEVEL_OUTOF10: 7
PAINLEVEL_OUTOF10: 4
PAINLEVEL_OUTOF10: 8
PAINLEVEL_OUTOF10: 7

## 2023-11-29 ASSESSMENT — PAIN - FUNCTIONAL ASSESSMENT
PAIN_FUNCTIONAL_ASSESSMENT: PREVENTS OR INTERFERES SOME ACTIVE ACTIVITIES AND ADLS
PAIN_FUNCTIONAL_ASSESSMENT: ACTIVITIES ARE NOT PREVENTED
PAIN_FUNCTIONAL_ASSESSMENT: ACTIVITIES ARE NOT PREVENTED
PAIN_FUNCTIONAL_ASSESSMENT: PREVENTS OR INTERFERES SOME ACTIVE ACTIVITIES AND ADLS
PAIN_FUNCTIONAL_ASSESSMENT: ACTIVITIES ARE NOT PREVENTED
PAIN_FUNCTIONAL_ASSESSMENT: PREVENTS OR INTERFERES SOME ACTIVE ACTIVITIES AND ADLS

## 2023-11-29 ASSESSMENT — PAIN DESCRIPTION - DESCRIPTORS
DESCRIPTORS: ACHING
DESCRIPTORS: PRESSURE;DISCOMFORT
DESCRIPTORS: DULL
DESCRIPTORS: ACHING
DESCRIPTORS: ACHING
DESCRIPTORS: PRESSURE
DESCRIPTORS: ACHING
DESCRIPTORS: PRESSURE
DESCRIPTORS: DULL
DESCRIPTORS: DISCOMFORT
DESCRIPTORS: ACHING

## 2023-11-29 NOTE — PLAN OF CARE
bowel function   Administer IV fluids as ordered to ensure adequate hydration     Problem: Gastrointestinal - Adult  Goal: Establish and maintain optimal ostomy function  Outcome: Progressing  Flowsheets (Taken 11/29/2023 0111)  Establish and maintain optimal ostomy function:   Monitor output from ostomies   Administer IV fluids and TPN as ordered   Gastric suctioning as ordered     Problem: Neurosensory - Adult  Goal: Achieves stable or improved neurological status  11/29/2023 0107 by Mary Merlos RN  Outcome: Progressing  Flowsheets (Taken 11/29/2023 0107)  Achieves stable or improved neurological status: Assess for and report changes in neurological status     Problem: Respiratory - Adult  Goal: Achieves optimal ventilation and oxygenation  11/29/2023 0107 by Mary Merlos RN  Outcome: Progressing  Flowsheets (Taken 11/29/2023 0107)  Achieves optimal ventilation and oxygenation:   Assess for changes in respiratory status   Assess for changes in mentation and behavior     Problem: Cardiovascular - Adult  Goal: Maintains optimal cardiac output and hemodynamic stability  11/29/2023 0107 by Mary Merlos RN  Outcome: Progressing  Flowsheets (Taken 11/29/2023 0107)  Maintains optimal cardiac output and hemodynamic stability:   Monitor blood pressure and heart rate   Assess for signs of decreased cardiac output   Administer vasoactive medications as ordered     Problem: Musculoskeletal - Adult  Goal: Return mobility to safest level of function  11/29/2023 0107 by Mary Merlos RN  Outcome: Progressing  Flowsheets (Taken 11/29/2023 0107)  Return Mobility to Safest Level of Function:   Assess patient stability and activity tolerance for standing, transferring and ambulating with or without assistive devices   Assist with transfers and ambulation using safe patient handling equipment as needed     Problem: Infection - Adult  Goal: Absence of infection during hospitalization  11/29/2023 0107 by Susan needed  11/29/2023 0107 by Beto Messina RN  Outcome: Progressing  Flowsheets (Taken 11/29/2023 0107)  Patient/family able to verbalize anxieties, fears, and concerns, and demonstrate effective coping: Assist patient/family to identify coping skills, available support systems and cultural and spiritual values     Problem: Nutrition Deficit:  Goal: Optimize nutritional status  11/29/2023 0107 by Beto Messina RN  Outcome: Progressing  Flowsheets (Taken 11/29/2023 0107)  Nutrient intake appropriate for improving, restoring, or maintaining nutritional needs:   Assess nutritional status and recommend course of action   Recommend, monitor, and adjust tube feedings and TPN/PPN based on assessed needs     Problem: Chronic Conditions and Co-morbidities  Goal: Patient's chronic conditions and co-morbidity symptoms are monitored and maintained or improved  11/29/2023 0111 by Beto Messina RN  Outcome: Progressing   Care plan reviewed with patient. Patient verbalizes understanding of the plan of care and contribute to goal setting.

## 2023-11-29 NOTE — CARE COORDINATION
11/29/23, 7:08 AM EST    DISCHARGE ON GOING EVALUATION    [facility] day: 12  Location: [facility]01/[facility]- Reason for admit: Diverticulitis [K57.92]  Acute diverticulitis [K57.92]   Procedure:   11/17 OR with Dr. Loni Small: Robotic Sigmoid Colectomy   11/20 NG placed  11/20 OR with Dr. Loni Small: Diagnostic laparoscopy. 11/24 CT A/P: IMPRESSION: Suspect SBO with transition point in the mid central small bowel loops. Numerous scattered pockets of free intraperitoneal air are seen both near the surgical anastomosis and throughout the abdomen which may indicate anastomotic leak. A small pocket of semi organized fluid in the dependent portion of the pelvis mildly enhances peripherally and contains foci of air density which could represent a developing abscess. Trace bilateral pleural effusions and patchy bilateral lower lobe airspace opacities may indicate pneumonia in the appropriate clinical setting. Follow-up to ensure resolution is advised. Diffuse total body wall edema suggesting anasarca is present. Nonspecific bladder wall thickening may be artifactual related to under distention. Correlate with urinalysis. 11/25 OR with Dr. Marko Dinh: Exploratory laparotomy with colonic anastomotic resection, washout, drain placement x2 and end-colostomy. Barriers to Discharge: Surgery following. POD #4, POD #9 and POD #12. NG clamped. Colostomy. Jean Claude drains x2, still with purulent drainage. WBC 13.3. Hgb 8.0. Lovenox. Pepcid iv bid (may give po). Toradol iv q6hr. Movantik. Zosyn iv q8hr. TPN. PCP: Roni CANNON  Readmission Risk Score: 21.7%  Patient Goals/Plan/Treatment Preferences: From home with parents. Possible need for HH at discharge due to new ostomy. Monitor.

## 2023-11-29 NOTE — PROGRESS NOTES
PICC Procedure Note    Naty Spann   Admitted- 11/17/2023  7:09 AM  Admission diagnosis- Diverticulitis [K57.92]  Acute diverticulitis [K57.92]      Attending Physician- Ju Peters MD  Ordering Physician-same  Indication for Insertion: TPN    Catheter Insertion Date- 11/29/2023   Lot Number- [id number]  Gauge-4  Lumen-dual    Insertion Site- ROSEMARY Basilic  Vein Diameter- 1.22 mm  Catheter Length- 45 cm  Internal Length- 45 cm  Exposed Catheter Length- 0cm   Upper Arm Circumference- 39cm  Tip Confirmation System Bundle met- Yes  If X-ray required, Tip Location- n/a  Radiologist- n/a    PICC insertion successful- Yes  Ultrasound- yes    Okay To Use PICC- Yes    Electronically signed by Polly Vicente, RN, RN on 11/29/2023 at 3:20 PM

## 2023-11-29 NOTE — PROGRESS NOTES
Comprehensive Nutrition Assessment    Type and Reason for Visit:  Reassess    Nutrition Recommendations/Plan:   Recommend continue current PPN macronutrients - 20 kcals/kgm, 1.7 gm protein/kgm, 30% kcals from lipids w/ dosing wt: 89 kgm. Consider obtaining central access if ongoing NPO status- currently peripheral line - however able to meet low end of estimated energy needs, 100% protein needs. Diet vs. NPO per MD as GI function allows. Will monitor need for additional nutrition interventions. Malnutrition Assessment:  Malnutrition Status: At risk for malnutrition (Comment) (11/27/23 1780)    Context:  Acute Illness     Findings of the 6 clinical characteristics of malnutrition:  Energy Intake:  50% or less of estimated energy requirements for 5 or more days  Weight Loss:  No significant weight loss     Body Fat Loss:  No significant body fat loss     Muscle Mass Loss:  No significant muscle mass loss    Fluid Accumulation:  Unable to assess     Strength:  Not Performed    Nutrition Assessment:     At risk for nutrition compromise r/t altered GI function - admit with diverticulitis, 11/17/23 sigmoid colectomy, 11/21 diagnostic laparoscopy, 11/24 colonic anastomotic resection and end colostomy, NPO status (TPN initiated 11/25) and underlying medical condition (POTS, PTSD, HTN, vapes nicotine). Nutrition Related Findings:      Wound Type: Surgical Incision (abdomen incision: 11/17 sigmoid colectomy; 11/24 colonic anastomotic resection and end colostomy)     Pt. Report/Treatments/Miscellaneous: pt. Seen - reports NGT clamped but states \"I asked them to unclamp it\" - d/t nausea; reports continues to feel bad; states Zofran helps take edge off nausea; +peripheral line  GI Status: 350 ml ostomy output; NGT clamped - just unclamped per pt.  Request   Pertinent Labs: 11/29: Glucose 117, BUN 17, Cr 0.6, Potassium 3.4, Magnesium 2.2, Phosphorus 2.8  Pertinent Meds: Movantik, Insulin, ATB, Zofran, Potassium Food/Nutrient Intake Outcomes: Diet Advancement/Tolerance, Parenteral Nutrition Intake/Tolerance  Physical Signs/Symptoms Outcomes: Biochemical Data, Nutrition Focused Physical Findings, Skin, Weight, GI Status, Fluid Status or Edema    Discharge Planning:     Too soon to determine     Harris De La Cruz RD, LD  Contact: [phone]

## 2023-11-29 NOTE — PLAN OF CARE
Problem: Discharge Planning  Goal: Discharge to home or other facility with appropriate resources  11/29/2023 0728 by Selene Mason RN  Outcome: Progressing  Flowsheets (Taken 11/29/2023 0107 by Avila Vegas, RN)  Discharge to home or other facility with appropriate resources:   Identify barriers to discharge with patient and caregiver   Arrange for needed discharge resources and transportation as appropriate   Identify discharge learning needs (meds, wound care, etc)   Refer to discharge planning if patient needs post-hospital services based on physician order or complex needs related to functional status, cognitive ability or social support system     Problem: Pain  Goal: Verbalizes/displays adequate comfort level or baseline comfort level  11/29/2023 0728 by Selene Mason RN  Outcome: Progressing  Flowsheets (Taken 11/29/2023 0107 by Avila Vegas RN)  Verbalizes/displays adequate comfort level or baseline comfort level:   Encourage patient to monitor pain and request assistance   Assess pain using appropriate pain scale   Administer analgesics based on type and severity of pain and evaluate response   Implement non-pharmacological measures as appropriate and evaluate response     Problem: ABCDS Injury Assessment  Goal: Absence of physical injury  11/29/2023 0728 by Selene Mason RN  Outcome: Progressing  Flowsheets (Taken 11/29/2023 0107 by Avila Vegas RN)  Absence of Physical Injury: Implement safety measures based on patient assessment     Problem: Safety - Adult  Goal: Free from fall injury  11/29/2023 0728 by Selene Mason RN  Outcome: Progressing     Problem: Skin/Tissue Integrity - Adult  Goal: Skin integrity remains intact  11/29/2023 0728 by Selene Mason RN  Outcome: Progressing  Flowsheets (Taken 11/29/2023 0107 by Avila Vegas RN)  Skin Integrity Remains Intact:   Monitor for areas of redness and/or skin breakdown   Assess vascular access sites hourly

## 2023-11-29 NOTE — PROGRESS NOTES
Stephanie Aguero MD  [facility]  General Surgery Postoperative Progress Note    Pt Name: Taylor Fisher  Medical Record Number: [id number]  Date of Birth 1995   Today's Date: 11/29/2023    CC:No chief complaint on file. ASSESSMENT   POD # 12 status post robotic assisted sigmoid colectomy and POD#9 status post diagnostic laparoscopy with evaluation of anastomosis and POD# 4 this post exploratory laparotomy with washout and diverting colostomy  HD # 12  PLAN   Feels the same today  NG tube was clamped all day yesterday he did a shower about 3:00 in the morning and they gave him his pills to take and adjust when he took the last pill he had an episode of emesis that was small. Prior to that he was doing well and has had a lot of stool out of his colostomy. Would like to add Reglan but since he is on respite all there is a much higher risk of experimental symptoms neuroleptic malignant syndrome so will not start  Bilirubin total has stabilized in the low to mid 2 range will follow  WBC down to 13,000 on Zosyn we will follow. He has had no fever still has purulent drainage out of the right lower quadrant Jean Claude drain. Lauryn Regalado is stable. He says he feels better-boyd today. No more nausea or vomiting is just his gag reflex and this tube in the back of his throat.   CURRENT MEDICATIONS   Scheduled Meds:   naloxegol  12.5 mg Oral QAM AC    insulin lispro  0-4 Units SubCUTAneous Q6H    sodium chloride flush  5-40 mL IntraVENous 2 times per day    famotidine  20 mg Oral BID    Or    famotidine (PEPCID) injection  20 mg IntraVENous BID    enoxaparin  30 mg SubCUTAneous BID    ketorolac  15 mg IntraVENous Q6H    piperacillin-tazobactam  4,500 mg IntraVENous Q8H    busPIRone  10 mg Oral BID    fludrocortisone  0.2 mg Oral Daily    metoprolol succinate  25 mg Oral Daily    [Held by provider] midodrine  10 mg Oral BID    propranolol  10 mg Oral BID    risperiDONE  0.5 mg Oral BID

## 2023-11-29 NOTE — CARE COORDINATION
Additional CM note: Discussed possibility of Kera consult with Dr Brandon Villalba. He does not feel patient is ready, and may need more intervention. CM will continue to monitor clinical progression for dc needs. Plans home with possible HH should NG be removed.

## 2023-11-29 NOTE — PROGRESS NOTES
Pharmacy Consult for TPN/PPN Monitoring & Adjustment  IV Access: peripheral      Dosing weight: 89 kg  Current insulin regimen: low sliding scale  Diet (excluding TPN): NPO  Maintenance fluid: none    Plan:  See components of tonight's TPN bag in the table below:     Date 11/27/23 11/28/23 11/29/23   Total kcal/kg 15 20 20   Total kcal 1335 1780 1780   Protein g/kg 1.5 1.7 1.7   Protein g 133.5 151.3 151.3   Protein kcal (4 kcal/g) 534 605.2 605.2   Lipid % 30 30 30   Lipid g 40 53.4 53.4   Lipid kcal (10 kcal/g) 400.5 534 534   Dextrose g 117.8 188. 5 188.5   Dextrose kcal (3.4 kcal/g) 400.5 640.8 640.8   Infusion Rate (mL/hr) 115 115 115   Na Acetate (mEq) 40 40 40   Na Chloride (mEq) 180 180 180   Na Phos (mmol)  (3 mmol = 4 mEq Na) 0 0 0   Total Na (mEq) 220 220 220   K Acetate (mEq) 52 52 72   K Chloride (mEq) 0 0 0   K Phos (mmol)  (15 mmol = 22 mEq K) 15 15 15   Total K (mEq) 74 74 94   Ca Gluconate (mEq)  (1 g = 4.65 mEq) 4.65 4.65 4.65   Mag Sulfate (mEq)  (1 g = 8.12 mEq) 8.12 8.12 8.12   Multivitamin (mL) 10 10 10   Trace Elements (mL) 1 1 1      Summary of changes for tonight's TPN:  Increase K Acetate to 72 mEq    Electrolyte Replacement:   Potassium chloride: 40 mEq    Monitoring:  BMP, Mag, iCal, & Phos ordered for tomorrow with AM labs. Pharmacy will continue to follow daily. Thank you for the consult.   Abdirizak Mccarty, PharmD, BCPS   11/29/2023  9:04 AM

## 2023-11-30 ENCOUNTER — APPOINTMENT (OUTPATIENT)
Dept: CT IMAGING | Age: 28
DRG: 231 | End: 2023-11-30
Attending: SURGERY
Payer: COMMERCIAL

## 2023-11-30 LAB
ANION GAP SERPL CALC-SCNC: 9 MEQ/L (ref 8–16)
BASOPHILS ABSOLUTE: 0 THOU/MM3 (ref 0–0.1)
BASOPHILS NFR BLD AUTO: 0.2 %
BUN SERPL-MCNC: 14 MG/DL (ref 7–22)
CA-I BLD ISE-SCNC: 1.1 MMOL/L (ref 1.12–1.32)
CALCIUM SERPL-MCNC: 7.4 MG/DL (ref 8.5–10.5)
CHLORIDE SERPL-SCNC: 101 MEQ/L (ref 98–111)
CO2 SERPL-SCNC: 23 MEQ/L (ref 23–33)
CREAT SERPL-MCNC: 0.5 MG/DL (ref 0.4–1.2)
DEPRECATED RDW RBC AUTO: 46.5 FL (ref 35–45)
EOSINOPHIL NFR BLD AUTO: 0.7 %
EOSINOPHILS ABSOLUTE: 0.1 THOU/MM3 (ref 0–0.4)
ERYTHROCYTE [DISTWIDTH] IN BLOOD BY AUTOMATED COUNT: 14.5 % (ref 11.5–14.5)
GFR SERPL CREATININE-BSD FRML MDRD: > 60 ML/MIN/1.73M2
GLUCOSE BLD STRIP.AUTO-MCNC: 113 MG/DL (ref 70–108)
GLUCOSE BLD STRIP.AUTO-MCNC: 116 MG/DL (ref 70–108)
GLUCOSE BLD STRIP.AUTO-MCNC: 119 MG/DL (ref 70–108)
GLUCOSE BLD STRIP.AUTO-MCNC: 125 MG/DL (ref 70–108)
GLUCOSE SERPL-MCNC: 106 MG/DL (ref 70–108)
HCT VFR BLD AUTO: 22.5 % (ref 42–52)
HGB BLD-MCNC: 7.3 GM/DL (ref 14–18)
IMM GRANULOCYTES # BLD AUTO: 1.34 THOU/MM3 (ref 0–0.07)
IMM GRANULOCYTES NFR BLD AUTO: 8.1 %
LYMPHOCYTES ABSOLUTE: 1.9 THOU/MM3 (ref 1–4.8)
LYMPHOCYTES NFR BLD AUTO: 11.2 %
MAGNESIUM SERPL-MCNC: 2.2 MG/DL (ref 1.6–2.4)
MCH RBC QN AUTO: 28.9 PG (ref 26–33)
MCHC RBC AUTO-ENTMCNC: 32.4 GM/DL (ref 32.2–35.5)
MCV RBC AUTO: 88.9 FL (ref 80–94)
MONOCYTES ABSOLUTE: 1.8 THOU/MM3 (ref 0.4–1.3)
MONOCYTES NFR BLD AUTO: 10.8 %
NEUTROPHILS NFR BLD AUTO: 69 %
NRBC BLD AUTO-RTO: 0 /100 WBC
PHOSPHATE SERPL-MCNC: 2.6 MG/DL (ref 2.4–4.7)
PLATELET # BLD AUTO: 245 THOU/MM3 (ref 130–400)
PMV BLD AUTO: 12.3 FL (ref 9.4–12.4)
POTASSIUM SERPL-SCNC: 3.6 MEQ/L (ref 3.5–5.2)
RBC # BLD AUTO: 2.53 MILL/MM3 (ref 4.7–6.1)
SEGMENTED NEUTROPHILS ABSOLUTE COUNT: 11.5 THOU/MM3 (ref 1.8–7.7)
SODIUM SERPL-SCNC: 133 MEQ/L (ref 135–145)
WBC # BLD AUTO: 16.6 THOU/MM3 (ref 4.8–10.8)

## 2023-11-30 PROCEDURE — 99024 POSTOP FOLLOW-UP VISIT: CPT | Performed by: SURGERY

## 2023-11-30 PROCEDURE — 82948 REAGENT STRIP/BLOOD GLUCOSE: CPT

## 2023-11-30 PROCEDURE — 82330 ASSAY OF CALCIUM: CPT

## 2023-11-30 PROCEDURE — 36415 COLL VENOUS BLD VENIPUNCTURE: CPT

## 2023-11-30 PROCEDURE — 6370000000 HC RX 637 (ALT 250 FOR IP): Performed by: SURGERY

## 2023-11-30 PROCEDURE — 1200000000 HC SEMI PRIVATE

## 2023-11-30 PROCEDURE — 74177 CT ABD & PELVIS W/CONTRAST: CPT

## 2023-11-30 PROCEDURE — 2500000003 HC RX 250 WO HCPCS: Performed by: SURGERY

## 2023-11-30 PROCEDURE — 6360000002 HC RX W HCPCS: Performed by: SURGERY

## 2023-11-30 PROCEDURE — 80048 BASIC METABOLIC PNL TOTAL CA: CPT

## 2023-11-30 PROCEDURE — 2580000003 HC RX 258: Performed by: SURGERY

## 2023-11-30 PROCEDURE — 85025 COMPLETE CBC W/AUTO DIFF WBC: CPT

## 2023-11-30 PROCEDURE — 83735 ASSAY OF MAGNESIUM: CPT

## 2023-11-30 PROCEDURE — 2580000003 HC RX 258: Performed by: NURSE PRACTITIONER

## 2023-11-30 PROCEDURE — 6360000004 HC RX CONTRAST MEDICATION: Performed by: SURGERY

## 2023-11-30 PROCEDURE — 6360000002 HC RX W HCPCS: Performed by: NURSE PRACTITIONER

## 2023-11-30 PROCEDURE — A4216 STERILE WATER/SALINE, 10 ML: HCPCS | Performed by: SURGERY

## 2023-11-30 PROCEDURE — 84100 ASSAY OF PHOSPHORUS: CPT

## 2023-11-30 RX ADMIN — LORAZEPAM 0.5 MG: 2 INJECTION INTRAMUSCULAR; INTRAVENOUS at 20:18

## 2023-11-30 RX ADMIN — PROPRANOLOL HYDROCHLORIDE 10 MG: 10 TABLET ORAL at 09:50

## 2023-11-30 RX ADMIN — ENOXAPARIN SODIUM 30 MG: 100 INJECTION SUBCUTANEOUS at 20:13

## 2023-11-30 RX ADMIN — RISPERIDONE 0.5 MG: 0.25 TABLET, FILM COATED ORAL at 20:16

## 2023-11-30 RX ADMIN — HYDROMORPHONE HYDROCHLORIDE 1 MG: 1 INJECTION, SOLUTION INTRAMUSCULAR; INTRAVENOUS; SUBCUTANEOUS at 08:42

## 2023-11-30 RX ADMIN — TRAZODONE HYDROCHLORIDE 150 MG: 50 TABLET ORAL at 20:16

## 2023-11-30 RX ADMIN — HYDROMORPHONE HYDROCHLORIDE 1 MG: 1 INJECTION, SOLUTION INTRAMUSCULAR; INTRAVENOUS; SUBCUTANEOUS at 16:45

## 2023-11-30 RX ADMIN — SODIUM CHLORIDE, PRESERVATIVE FREE 20 MG: 5 INJECTION INTRAVENOUS at 10:10

## 2023-11-30 RX ADMIN — HYDROMORPHONE HYDROCHLORIDE 1 MG: 1 INJECTION, SOLUTION INTRAMUSCULAR; INTRAVENOUS; SUBCUTANEOUS at 06:26

## 2023-11-30 RX ADMIN — ACETAMINOPHEN 650 MG: 325 TABLET ORAL at 21:32

## 2023-11-30 RX ADMIN — FLUDROCORTISONE ACETATE 0.2 MG: 0.1 TABLET ORAL at 09:49

## 2023-11-30 RX ADMIN — LORAZEPAM 0.5 MG: 2 INJECTION INTRAMUSCULAR; INTRAVENOUS at 04:10

## 2023-11-30 RX ADMIN — HYDROMORPHONE HYDROCHLORIDE 1 MG: 1 INJECTION, SOLUTION INTRAMUSCULAR; INTRAVENOUS; SUBCUTANEOUS at 14:36

## 2023-11-30 RX ADMIN — BUSPIRONE HYDROCHLORIDE 10 MG: 10 TABLET ORAL at 09:49

## 2023-11-30 RX ADMIN — CALCIUM GLUCONATE: 98 INJECTION, SOLUTION INTRAVENOUS at 19:11

## 2023-11-30 RX ADMIN — VENLAFAXINE HYDROCHLORIDE 150 MG: 150 CAPSULE, EXTENDED RELEASE ORAL at 22:19

## 2023-11-30 RX ADMIN — PIPERACILLIN AND TAZOBACTAM 4500 MG: 4; .5 INJECTION, POWDER, FOR SOLUTION INTRAVENOUS at 18:40

## 2023-11-30 RX ADMIN — HYDROMORPHONE HYDROCHLORIDE 1 MG: 1 INJECTION, SOLUTION INTRAMUSCULAR; INTRAVENOUS; SUBCUTANEOUS at 22:19

## 2023-11-30 RX ADMIN — METOPROLOL SUCCINATE 25 MG: 25 TABLET, EXTENDED RELEASE ORAL at 09:50

## 2023-11-30 RX ADMIN — SODIUM CHLORIDE, PRESERVATIVE FREE 10 ML: 5 INJECTION INTRAVENOUS at 20:18

## 2023-11-30 RX ADMIN — SODIUM CHLORIDE, PRESERVATIVE FREE 10 ML: 5 INJECTION INTRAVENOUS at 09:38

## 2023-11-30 RX ADMIN — LORAZEPAM 0.5 MG: 2 INJECTION INTRAMUSCULAR; INTRAVENOUS at 13:25

## 2023-11-30 RX ADMIN — PROPRANOLOL HYDROCHLORIDE 10 MG: 10 TABLET ORAL at 20:16

## 2023-11-30 RX ADMIN — SODIUM CHLORIDE, PRESERVATIVE FREE 20 MG: 5 INJECTION INTRAVENOUS at 20:16

## 2023-11-30 RX ADMIN — PIPERACILLIN AND TAZOBACTAM 4500 MG: 4; .5 INJECTION, POWDER, FOR SOLUTION INTRAVENOUS at 11:54

## 2023-11-30 RX ADMIN — HYDROMORPHONE HYDROCHLORIDE 1 MG: 1 INJECTION, SOLUTION INTRAMUSCULAR; INTRAVENOUS; SUBCUTANEOUS at 19:32

## 2023-11-30 RX ADMIN — PIPERACILLIN AND TAZOBACTAM 4500 MG: 4; .5 INJECTION, POWDER, FOR SOLUTION INTRAVENOUS at 04:09

## 2023-11-30 RX ADMIN — HYDROMORPHONE HYDROCHLORIDE 1 MG: 1 INJECTION, SOLUTION INTRAMUSCULAR; INTRAVENOUS; SUBCUTANEOUS at 04:10

## 2023-11-30 RX ADMIN — SODIUM CHLORIDE, PRESERVATIVE FREE 10 ML: 5 INJECTION INTRAVENOUS at 11:21

## 2023-11-30 RX ADMIN — RISPERIDONE 0.5 MG: 0.25 TABLET, FILM COATED ORAL at 09:50

## 2023-11-30 RX ADMIN — BUSPIRONE HYDROCHLORIDE 10 MG: 10 TABLET ORAL at 20:16

## 2023-11-30 RX ADMIN — HYDROMORPHONE HYDROCHLORIDE 1 MG: 1 INJECTION, SOLUTION INTRAMUSCULAR; INTRAVENOUS; SUBCUTANEOUS at 11:19

## 2023-11-30 RX ADMIN — IOPAMIDOL 80 ML: 755 INJECTION, SOLUTION INTRAVENOUS at 13:00

## 2023-11-30 RX ADMIN — SODIUM CHLORIDE, PRESERVATIVE FREE 10 ML: 5 INJECTION INTRAVENOUS at 11:59

## 2023-11-30 RX ADMIN — HYDROMORPHONE HYDROCHLORIDE 1 MG: 1 INJECTION, SOLUTION INTRAMUSCULAR; INTRAVENOUS; SUBCUTANEOUS at 01:09

## 2023-11-30 RX ADMIN — NALOXEGOL OXALATE 12.5 MG: 12.5 TABLET, FILM COATED ORAL at 05:23

## 2023-11-30 RX ADMIN — ENOXAPARIN SODIUM 30 MG: 100 INJECTION SUBCUTANEOUS at 09:36

## 2023-11-30 RX ADMIN — Medication 6 MG: at 20:16

## 2023-11-30 ASSESSMENT — PAIN DESCRIPTION - ORIENTATION
ORIENTATION: LEFT;LOWER
ORIENTATION: ANTERIOR
ORIENTATION: LEFT;LOWER
ORIENTATION: LEFT;LOWER
ORIENTATION: ANTERIOR
ORIENTATION: MID
ORIENTATION: ANTERIOR
ORIENTATION: MID
ORIENTATION: ANTERIOR
ORIENTATION: ANTERIOR

## 2023-11-30 ASSESSMENT — PAIN DESCRIPTION - FREQUENCY
FREQUENCY: CONTINUOUS

## 2023-11-30 ASSESSMENT — PAIN DESCRIPTION - DESCRIPTORS
DESCRIPTORS: ACHING
DESCRIPTORS: DULL;ACHING;CRAMPING
DESCRIPTORS: DULL;ACHING;CRAMPING
DESCRIPTORS: DULL
DESCRIPTORS: ACHING;DISCOMFORT;SORE
DESCRIPTORS: ACHING;PRESSURE
DESCRIPTORS: ACHING
DESCRIPTORS: ACHING;CRAMPING
DESCRIPTORS: ACHING
DESCRIPTORS: ACHING
DESCRIPTORS: ACHING;DISCOMFORT;SORE
DESCRIPTORS: DULL;ACHING

## 2023-11-30 ASSESSMENT — PAIN DESCRIPTION - ONSET
ONSET: ON-GOING

## 2023-11-30 ASSESSMENT — PAIN DESCRIPTION - LOCATION
LOCATION: ABDOMEN

## 2023-11-30 ASSESSMENT — PAIN SCALES - GENERAL
PAINLEVEL_OUTOF10: 7
PAINLEVEL_OUTOF10: 6
PAINLEVEL_OUTOF10: 7
PAINLEVEL_OUTOF10: 8
PAINLEVEL_OUTOF10: 7
PAINLEVEL_OUTOF10: 9
PAINLEVEL_OUTOF10: 6
PAINLEVEL_OUTOF10: 7

## 2023-11-30 ASSESSMENT — PAIN DESCRIPTION - PAIN TYPE
TYPE: SURGICAL PAIN

## 2023-11-30 NOTE — PROGRESS NOTES
Comprehensive Nutrition Assessment    Type and Reason for Visit:  Reassess    Nutrition Recommendations/Plan:   Recommend the following TPN changes for next bag - increase kcals to 23/kgm. Continue with 1.7 gm protein, 30% kcals from lipids and 89 kgm dosing wt. Diet vs. NPO per MD as GI function allows. Will monitor need for additional nutrition interventions. Malnutrition Assessment:  Malnutrition Status: At risk for malnutrition (Comment) (11/27/23 9570)    Context:  Acute Illness     Findings of the 6 clinical characteristics of malnutrition:  Energy Intake:  50% or less of estimated energy requirements for 5 or more days  Weight Loss:  No significant weight loss     Body Fat Loss:  No significant body fat loss     Muscle Mass Loss:  No significant muscle mass loss    Fluid Accumulation:  Unable to assess     Strength:  Not Performed    Nutrition Assessment:     At risk for nutrition compromise r/t altered GI function - admit with diverticulitis, 11/17/23 sigmoid colectomy, 11/21 diagnostic laparoscopy, 11/24 colonic anastomotic resection and end colostomy, NPO status (TPN initiated 11/25) and underlying medical condition (POTS, PTSD, HTN, vapes nicotine). Nutrition Related Findings:      Wound Type: Surgical Incision (abdomen incision: 11/17 sigmoid colectomy; 11/24 colonic anastomotic resection and end colostomy)     Pt. Report/Treatments/Miscellaneous: pt. Seen w/ SO; c/o feeling hungry; reports having nausea; SO states nausea is chronic d/t POTS; NGT clamped; per RN - states pt. Won't take oral medications d/t nausea; reports pt.  Is taking some ice chips and keeping them down; s/p PICC line placement 11/29; CT abd today  GI Status: 320 ml ostomy output past 24 hours  Pertinent Labs: 11/30: Glucose 106, BUN 14, Cr 0.5, Potassium 3.6, Magnesium 2.2, Phosphorus 2.6, Sodium 133  Pertinent Meds: Movantik, Insulin, ATB, Zofran, Pepcid      Current Nutrition Intake & Therapies:    Average Meal assessment       Nutrition Monitoring and Evaluation:      Food/Nutrient Intake Outcomes: Diet Advancement/Tolerance, Parenteral Nutrition Intake/Tolerance  Physical Signs/Symptoms Outcomes: Biochemical Data, Nutrition Focused Physical Findings, Skin, Weight, GI Status, Fluid Status or Edema    Discharge Planning:     Too soon to determine     Antolin Schwartz RD, LD  Contact: [phone]

## 2023-11-30 NOTE — PLAN OF CARE
Problem: Discharge Planning  Goal: Discharge to home or other facility with appropriate resources  11/30/2023 1254 by Boogie Alonso RN  Outcome: Progressing     Problem: Pain  Goal: Verbalizes/displays adequate comfort level or baseline comfort level  11/30/2023 1254 by Boogie Alonso RN  Outcome: Progressing     Problem: ABCDS Injury Assessment  Goal: Absence of physical injury  11/30/2023 1254 by Boogie Alonso RN  Outcome: Progressing     Problem: Safety - Adult  Goal: Free from fall injury  11/30/2023 1254 by Boogie Alonso RN  Outcome: Progressing

## 2023-11-30 NOTE — PLAN OF CARE
Problem: Discharge Planning  Goal: Discharge to home or other facility with appropriate resources  Outcome: Progressing  Flowsheets (Taken 11/30/2023 0300)  Discharge to home or other facility with appropriate resources:   Identify barriers to discharge with patient and caregiver   Arrange for needed discharge resources and transportation as appropriate   Identify discharge learning needs (meds, wound care, etc)   Refer to discharge planning if patient needs post-hospital services based on physician order or complex needs related to functional status, cognitive ability or social support system   Discharge plan is home with his parents. Awaiting for further discharge instructions and goals. Problem: Pain  Goal: Verbalizes/displays adequate comfort level or baseline comfort level  Outcome: Progressing  Flowsheets (Taken 11/30/2023 0300)  Verbalizes/displays adequate comfort level or baseline comfort level:   Encourage patient to monitor pain and request assistance   Assess pain using appropriate pain scale   Administer analgesics based on type and severity of pain and evaluate response   Implement non-pharmacological measures as appropriate and evaluate response   Notify Licensed Independent Practitioner if interventions unsuccessful or patient reports new pain     Problem: ABCDS Injury Assessment  Goal: Absence of physical injury  Outcome: Progressing   Patient using call light appropriately to call for assistance with ambulation to bathroom. Personal items within reach. Patient is also compliant with use of non-skid slippers. Problem: Safety - Adult  Goal: Free from fall injury  Outcome: Progressing   Fall assessment completed. Patient using call light appropriately to call for assistance with ambulation to bathroom. Personal items within reach. Patient is also compliant with use of non-skid slippers.     Problem: Skin/Tissue Integrity - Adult    Goal: Skin integrity remains intact  Outcome: Conditions and Co-morbidities  Goal: Patient's chronic conditions and co-morbidity symptoms are monitored and maintained or improved  Outcome: Progressing  Flowsheets (Taken 11/30/2023 0300)  Care Plan - Patient's Chronic Conditions and Co-Morbidity Symptoms are Monitored and Maintained or Improved:   Monitor and assess patient's chronic conditions and comorbid symptoms for stability, deterioration, or improvement   Collaborate with multidisciplinary team to address chronic and comorbid conditions and prevent exacerbation or deterioration   Update acute care plan with appropriate goals if chronic or comorbid symptoms are exacerbated and prevent overall improvement and discharge     Care plan reviewed with patient. Patient verbalize understanding of the plan of care and contribute to goal setting.

## 2023-11-30 NOTE — PROGRESS NOTES
Percy Prajapati MD  [facility]  General Surgery Postoperative Progress Note    Pt Name: Deanna Medellin  Medical Record Number: [id number]  Date of Birth 1995   Today's Date: 11/30/2023    CC:No chief complaint on file. ASSESSMENT   POD # 13 status post robotic assisted sigmoid colectomy and POD #10 status post diagnostic laparoscopy with evaluation of anastomosis and POD# 5 this post exploratory laparotomy with washout and diverting colostomy  HD # 13    I And O  seem very off for yesterday's JORGITO's from 7-7 they have IV piggyback recorded of 1.3 L and TPN of 5.8 L when he is just getting TPN at 150 an hour and antibiotics they also have urine output of 9.5 L for that 12-hour shift which also seems inaccurate. I have asked nursing to go back through and either confirm or correct that  Hemoglobin lower at 7.3 he really has no obvious signs of bleeding some not sure whether this is dilutional  or lab error we will just repeat CBC tomorrow  PLAN   Having more pain today breakthrough pain between his every 3 hours Dilaudid describes at lower central pelvis  NG tube has been clamped all day for 2 days. Said he had 1 episode yesterday where he felt nauseated like he was going to have emesis. Would like to add Reglan but since he is on respite all there is a much higher risk of experimental symptoms neuroleptic malignant syndrome so will not start  Bilirubin total has stabilized in the low to mid 2 range will follow  WBC increased to 16,000 on Zosyn, no fever recorded, right lower quadrant drain has become serous from purulent we will plan CT scan abdomen and pelvis with IV and oral contrast if he will tolerate it to evaluate for any intra-abdominal pathology requiring intervention or reoperation.   CT scan reviewed there is some fluid collection up in the left mid quadrant close to the left upper quadrant drain but it does not appear to me indicate it is not overwhelmingly obvious an abscess and 11/24/2023 2:47 PM      CT ABDOMEN PELVIS W IV CONTRAST Additional Contrast? None   Final Result   1. Suspect small bowel obstruction with transition point in the mid central small bowel loops (series 301, images 60 through 64). Numerous scattered pockets of free intraperitoneal air are seen both near the surgical anastomosis and throughout the    abdomen which may indicate anastomotic leak. Evaluation of the colon is limited due to under distention. 2. A small pocket of semi organized fluid in the dependent portion of the pelvis mildly (series 601, image 42 and series 301, image 86) enhances peripherally and contains foci of air density which could represent a developing abscess. The collection    measures approximately 5.0 x 6.4 x 2.7 cm (series 601, image 42 and series 301, image 66). 3. Trace bilateral pleural effusions and patchy bilateral lower lobe airspace opacities may indicate pneumonia in the appropriate clinical setting. Follow-up to ensure resolution is advised. Diffuse total body wall edema suggesting anasarca is present. 4. Nonspecific bladder wall thickening may be artifactual related to underdistention. Correlate with urinalysis. No renal calculus or obstructive uropathy is observed. **This report has been created using voice recognition software. It may contain minor errors which are inherent in voice recognition technology. **      Final report electronically signed by Dr Gopal Sethi on 11/24/2023 10:42 AM      XR ABDOMEN FOR NG/OG/NE TUBE PLACEMENT   Final Result      Nasogastric tube as above. Final report electronically signed by Dr. Marialuisa Pate on 11/22/2023 4:35 PM      XR ABDOMEN FOR NG/OG/NE TUBE PLACEMENT   Final Result      Nasogastric tube as above.       Final report electronically signed by Dr. Marialuisa Pate on 11/20/2023 4:49 PM      CT ABDOMEN PELVIS W IV CONTRAST Additional Contrast? Oral    (Results Pending)     No results

## 2023-11-30 NOTE — PROGRESS NOTES
[facility] Wound Ostomy Continence Nurse  Progress Note       Hernandez Aleman  AGE: 33 y.o. GENDER: male  : 1995  UNIT: 5K-01/001-A  TODAY'S DATE:  2023  ADMISSION DATE: 2023  7:09 AM  Subjective:     Reason for [facility] Evaluation and Assessment: new colostomy pouching system change      Hernandez Aleman is a 33 y.o. male referred by:   [x] Physician/PA/APRN  [] Nursing  [] Other:     Objective:     Tito Risk Score: Tito Scale Score: 16    Assessment:     Encounter: Present to pt room for new colostomy consult. Status post exploratory laparotomy with colonic anastomotic resection and end colostomy, per Dr. Prince Acevedo. Patient lying in bed upon arrival. Removed old pouching system to left upper quadrant. Cleansed stoma with warm wash cloth, pat dry. Stoma red, moist, and slightly protrudes. Peristomal skin pink and intact. Mucocutaneous junction separation at  o'clock, sutures in place. Stoma measured 50-55mm. Coloplast 2 piece red flange cut to fit to size, protective ring applied to inner edge of flange, centered over stoma. Pouch applied. Applied hands lightly over system to activate hydrocolloid. Patient asleep through change. Patient has dehisced incision to right lower abdomen, staples remain at wound edge. Cleansed wound with normal saline and gauze. Pat dry with clean gauze. Lightly filled wound bed with NS moistened gauze, covered with ABD pad (including penrose drain), secured with tape. Staff to change each shift and PRN for drainage. Will plan to change pouching system and educate early next week. Will continue to follow patient. Bed in low, call light in reach. Recommend reviewing concerns with Dr Stef Hubbard.      23    Ostomy: Mucocutaneous junction separation- probes at 3 o'clock of 4cm      Wound type: Dehisced surgical incision, right lower abdomen  Wound size: 1cm x 4.3cm x 5cm  Undermining or Tunneling: None  Wound assessment/color: Yellow  Drainage amount:

## 2023-11-30 NOTE — PROGRESS NOTES
Pt has horizontal incision to RLQ, incision was secured with staples, however when assessed the incision had opened up in the middle and is only secured with the 2 outer staples. This RN notified Dr. Fernie Kumar, this RN received instruction to pack incision. WC at bedside to assess.

## 2023-11-30 NOTE — PROGRESS NOTES
Patient alert and oriented x4. Pain 7/10 to abdomen, Pain medication not due at this time. PERRLA, Speech clear and appropriate. Upper extremities warm, pink and dry. Sensation present. Denies numbness or tingling. Free from restrictive movement. Hand grasp strong and equal bilaterally. Skin turgor less than 3 seconds. Capillary refill less than 3 seconds. Apical pulse 104, regular, fast rhythm. Lung sounds clear through out  bilaterally. Bowel sounds active x4. Abdomen rounded and firm. Colostomy to left upper quadrant. NELY drains to Right upper quadrant and left upper quadrant. Midline dressings in place. Dressing to right lower quadrant. Dressings not removed at this time. Lower extremities warm dry and intact. Patient states numbness and tingling to bilateral lower extremities. Skin over bony prominences intact. Patient resting in bed, Waiting for pain medication. Call light in reach.

## 2023-11-30 NOTE — PROGRESS NOTES
Pharmacy Consult for TPN/PPN Monitoring & Adjustment  IV Access: central      Dosing weight: 89 kg  Current insulin regimen: low sliding scale  Diet (excluding TPN): NPO  Maintenance fluid: none    Plan:  See components of tonight's TPN bag in the table below:     Date 11/27/23 11/28/23 11/29/23 11/30/2023   Total kcal/kg 15 20 20 23   Total kcal 1335 1780 1780 2047   Protein g/kg 1.5 1.7 1.7 1.7   Protein g 133.5 151.3 151.3 151.3   Protein kcal (4 kcal/g) 534 605.2 605.2 605.2   Lipid % 30 30 30 30   Lipid g 40 53.4 53.4 61.4   Lipid kcal (10 kcal/g) 400.5 534 534 614.1   Dextrose g 117.8 188. 5 188.5 243.4   Dextrose kcal (3.4 kcal/g) 400.5 640.8 640.8 827.7   Infusion Rate (mL/hr) 115 115 115 115   Na Acetate (mEq) 40 40 40 40   Na Chloride (mEq) 180 180 180 220   Na Phos (mmol)  (3 mmol = 4 mEq Na) 0 0 0 0   Total Na (mEq) 220 220 220 260   K Acetate (mEq) 52 52 72 72   K Chloride (mEq) 0 0 0 0   K Phos (mmol)  (15 mmol = 22 mEq K) 15 15 15 15   Total K (mEq) 74 74 94 94   Ca Gluconate (mEq)  (1 g = 4.65 mEq) 4.65 4.65 4.65 9.3   Mag Sulfate (mEq)  (1 g = 8.12 mEq) 8.12 8.12 8.12 8.12   Multivitamin (mL) 10 10 10 10   Trace Elements (mL) 1 1 1 1      Summary of changes for tonight's TPN: Increase sodium chloride 220 mEq  Increase calcium gluconate 9.3 mEq  Increase macronutrients per dietary     Electrolyte Replacement:   none    Monitoring:  BMP, Mag, iCal, & Phos ordered for tomorrow with AM labs. Pharmacy will continue to follow daily. Thank you for the consult.     Ayush Saba, RockyD, BCPS  11/30/2023  11:27 AM

## 2023-12-01 LAB
ALBUMIN SERPL BCG-MCNC: 1.9 G/DL (ref 3.5–5.1)
ALP SERPL-CCNC: 61 U/L (ref 38–126)
ALT SERPL W/O P-5'-P-CCNC: 35 U/L (ref 11–66)
ANION GAP SERPL CALC-SCNC: 11 MEQ/L (ref 8–16)
AST SERPL-CCNC: 35 U/L (ref 5–40)
BASOPHILS ABSOLUTE: 0.1 THOU/MM3 (ref 0–0.1)
BASOPHILS NFR BLD AUTO: 0.4 %
BILIRUB CONJ SERPL-MCNC: 1.8 MG/DL (ref 0–0.3)
BILIRUB SERPL-MCNC: 2.2 MG/DL (ref 0.3–1.2)
BUN SERPL-MCNC: 12 MG/DL (ref 7–22)
CA-I BLD ISE-SCNC: 1.1 MMOL/L (ref 1.12–1.32)
CALCIUM SERPL-MCNC: 7.8 MG/DL (ref 8.5–10.5)
CHLORIDE SERPL-SCNC: 98 MEQ/L (ref 98–111)
CO2 SERPL-SCNC: 24 MEQ/L (ref 23–33)
CREAT SERPL-MCNC: 0.6 MG/DL (ref 0.4–1.2)
DEPRECATED RDW RBC AUTO: 47 FL (ref 35–45)
EOSINOPHIL NFR BLD AUTO: 0.7 %
EOSINOPHILS ABSOLUTE: 0.1 THOU/MM3 (ref 0–0.4)
ERYTHROCYTE [DISTWIDTH] IN BLOOD BY AUTOMATED COUNT: 14.6 % (ref 11.5–14.5)
GFR SERPL CREATININE-BSD FRML MDRD: > 60 ML/MIN/1.73M2
GLUCOSE BLD STRIP.AUTO-MCNC: 125 MG/DL (ref 70–108)
GLUCOSE BLD STRIP.AUTO-MCNC: 136 MG/DL (ref 70–108)
GLUCOSE BLD STRIP.AUTO-MCNC: 143 MG/DL (ref 70–108)
GLUCOSE BLD STRIP.AUTO-MCNC: 147 MG/DL (ref 70–108)
GLUCOSE SERPL-MCNC: 141 MG/DL (ref 70–108)
HCT VFR BLD AUTO: 22.7 % (ref 42–52)
HGB BLD-MCNC: 7.5 GM/DL (ref 14–18)
IMM GRANULOCYTES # BLD AUTO: 1.49 THOU/MM3 (ref 0–0.07)
IMM GRANULOCYTES NFR BLD AUTO: 8.5 %
LYMPHOCYTES ABSOLUTE: 2 THOU/MM3 (ref 1–4.8)
LYMPHOCYTES NFR BLD AUTO: 11.4 %
MAGNESIUM SERPL-MCNC: 2.2 MG/DL (ref 1.6–2.4)
MCH RBC QN AUTO: 29.4 PG (ref 26–33)
MCHC RBC AUTO-ENTMCNC: 33 GM/DL (ref 32.2–35.5)
MCV RBC AUTO: 89 FL (ref 80–94)
MONOCYTES ABSOLUTE: 2.1 THOU/MM3 (ref 0.4–1.3)
MONOCYTES NFR BLD AUTO: 11.9 %
NEUTROPHILS NFR BLD AUTO: 67.1 %
NRBC BLD AUTO-RTO: 0 /100 WBC
PHOSPHATE SERPL-MCNC: 3.7 MG/DL (ref 2.4–4.7)
PLATELET # BLD AUTO: 311 THOU/MM3 (ref 130–400)
PMV BLD AUTO: 11.6 FL (ref 9.4–12.4)
POTASSIUM SERPL-SCNC: 3.7 MEQ/L (ref 3.5–5.2)
PROT SERPL-MCNC: 5.9 G/DL (ref 6.1–8)
RBC # BLD AUTO: 2.55 MILL/MM3 (ref 4.7–6.1)
SEGMENTED NEUTROPHILS ABSOLUTE COUNT: 11.7 THOU/MM3 (ref 1.8–7.7)
SODIUM SERPL-SCNC: 133 MEQ/L (ref 135–145)
WBC # BLD AUTO: 17.4 THOU/MM3 (ref 4.8–10.8)

## 2023-12-01 PROCEDURE — 6360000002 HC RX W HCPCS: Performed by: SURGERY

## 2023-12-01 PROCEDURE — A4216 STERILE WATER/SALINE, 10 ML: HCPCS | Performed by: SURGERY

## 2023-12-01 PROCEDURE — 82948 REAGENT STRIP/BLOOD GLUCOSE: CPT

## 2023-12-01 PROCEDURE — 6370000000 HC RX 637 (ALT 250 FOR IP): Performed by: SURGERY

## 2023-12-01 PROCEDURE — 85025 COMPLETE CBC W/AUTO DIFF WBC: CPT

## 2023-12-01 PROCEDURE — 2500000003 HC RX 250 WO HCPCS: Performed by: SURGERY

## 2023-12-01 PROCEDURE — 2580000003 HC RX 258: Performed by: NURSE PRACTITIONER

## 2023-12-01 PROCEDURE — 1200000000 HC SEMI PRIVATE

## 2023-12-01 PROCEDURE — 99024 POSTOP FOLLOW-UP VISIT: CPT | Performed by: SURGERY

## 2023-12-01 PROCEDURE — 82248 BILIRUBIN DIRECT: CPT

## 2023-12-01 PROCEDURE — 80053 COMPREHEN METABOLIC PANEL: CPT

## 2023-12-01 PROCEDURE — 36415 COLL VENOUS BLD VENIPUNCTURE: CPT

## 2023-12-01 PROCEDURE — 2580000003 HC RX 258: Performed by: SURGERY

## 2023-12-01 PROCEDURE — 82330 ASSAY OF CALCIUM: CPT

## 2023-12-01 PROCEDURE — 6360000002 HC RX W HCPCS: Performed by: NURSE PRACTITIONER

## 2023-12-01 PROCEDURE — 84100 ASSAY OF PHOSPHORUS: CPT

## 2023-12-01 PROCEDURE — 83735 ASSAY OF MAGNESIUM: CPT

## 2023-12-01 RX ADMIN — SODIUM CHLORIDE, PRESERVATIVE FREE 20 MG: 5 INJECTION INTRAVENOUS at 20:13

## 2023-12-01 RX ADMIN — LORAZEPAM 0.5 MG: 2 INJECTION INTRAMUSCULAR; INTRAVENOUS at 23:12

## 2023-12-01 RX ADMIN — HYDROMORPHONE HYDROCHLORIDE 1 MG: 1 INJECTION, SOLUTION INTRAMUSCULAR; INTRAVENOUS; SUBCUTANEOUS at 13:54

## 2023-12-01 RX ADMIN — HYDROMORPHONE HYDROCHLORIDE 1 MG: 1 INJECTION, SOLUTION INTRAMUSCULAR; INTRAVENOUS; SUBCUTANEOUS at 09:55

## 2023-12-01 RX ADMIN — VENLAFAXINE HYDROCHLORIDE 150 MG: 150 CAPSULE, EXTENDED RELEASE ORAL at 20:16

## 2023-12-01 RX ADMIN — HYDROMORPHONE HYDROCHLORIDE 1 MG: 1 INJECTION, SOLUTION INTRAMUSCULAR; INTRAVENOUS; SUBCUTANEOUS at 11:55

## 2023-12-01 RX ADMIN — HYDROMORPHONE HYDROCHLORIDE 1 MG: 1 INJECTION, SOLUTION INTRAMUSCULAR; INTRAVENOUS; SUBCUTANEOUS at 17:58

## 2023-12-01 RX ADMIN — METOPROLOL SUCCINATE 25 MG: 25 TABLET, EXTENDED RELEASE ORAL at 08:13

## 2023-12-01 RX ADMIN — SODIUM CHLORIDE, PRESERVATIVE FREE 10 ML: 5 INJECTION INTRAVENOUS at 20:15

## 2023-12-01 RX ADMIN — Medication 6 MG: at 20:13

## 2023-12-01 RX ADMIN — LORAZEPAM 0.5 MG: 2 INJECTION INTRAMUSCULAR; INTRAVENOUS at 08:21

## 2023-12-01 RX ADMIN — ENOXAPARIN SODIUM 30 MG: 100 INJECTION SUBCUTANEOUS at 20:13

## 2023-12-01 RX ADMIN — ANTACID TABLETS 500 MG: 500 TABLET, CHEWABLE ORAL at 17:03

## 2023-12-01 RX ADMIN — HYDROMORPHONE HYDROCHLORIDE 1 MG: 1 INJECTION, SOLUTION INTRAMUSCULAR; INTRAVENOUS; SUBCUTANEOUS at 02:21

## 2023-12-01 RX ADMIN — NALOXEGOL OXALATE 12.5 MG: 12.5 TABLET, FILM COATED ORAL at 06:24

## 2023-12-01 RX ADMIN — SODIUM CHLORIDE, PRESERVATIVE FREE 20 MG: 5 INJECTION INTRAVENOUS at 08:31

## 2023-12-01 RX ADMIN — CALCIUM GLUCONATE: 98 INJECTION, SOLUTION INTRAVENOUS at 18:05

## 2023-12-01 RX ADMIN — ACETAMINOPHEN 650 MG: 325 TABLET ORAL at 20:13

## 2023-12-01 RX ADMIN — HYDROMORPHONE HYDROCHLORIDE 1 MG: 1 INJECTION, SOLUTION INTRAMUSCULAR; INTRAVENOUS; SUBCUTANEOUS at 15:58

## 2023-12-01 RX ADMIN — HYDROMORPHONE HYDROCHLORIDE 1 MG: 1 INJECTION, SOLUTION INTRAMUSCULAR; INTRAVENOUS; SUBCUTANEOUS at 04:26

## 2023-12-01 RX ADMIN — BUSPIRONE HYDROCHLORIDE 10 MG: 10 TABLET ORAL at 08:13

## 2023-12-01 RX ADMIN — HYDROMORPHONE HYDROCHLORIDE 1 MG: 1 INJECTION, SOLUTION INTRAMUSCULAR; INTRAVENOUS; SUBCUTANEOUS at 22:06

## 2023-12-01 RX ADMIN — HYDROMORPHONE HYDROCHLORIDE 1 MG: 1 INJECTION, SOLUTION INTRAMUSCULAR; INTRAVENOUS; SUBCUTANEOUS at 20:05

## 2023-12-01 RX ADMIN — BUSPIRONE HYDROCHLORIDE 10 MG: 10 TABLET ORAL at 20:14

## 2023-12-01 RX ADMIN — PHENOL 1 SPRAY: 1.5 LIQUID ORAL at 08:00

## 2023-12-01 RX ADMIN — LORAZEPAM 0.5 MG: 2 INJECTION INTRAMUSCULAR; INTRAVENOUS at 16:56

## 2023-12-01 RX ADMIN — PIPERACILLIN AND TAZOBACTAM 4500 MG: 4; .5 INJECTION, POWDER, FOR SOLUTION INTRAVENOUS at 02:20

## 2023-12-01 RX ADMIN — ANTACID TABLETS 500 MG: 500 TABLET, CHEWABLE ORAL at 00:31

## 2023-12-01 RX ADMIN — PROPRANOLOL HYDROCHLORIDE 10 MG: 10 TABLET ORAL at 20:15

## 2023-12-01 RX ADMIN — ENOXAPARIN SODIUM 30 MG: 100 INJECTION SUBCUTANEOUS at 08:42

## 2023-12-01 RX ADMIN — PIPERACILLIN AND TAZOBACTAM 4500 MG: 4; .5 INJECTION, POWDER, FOR SOLUTION INTRAVENOUS at 11:23

## 2023-12-01 RX ADMIN — RISPERIDONE 0.5 MG: 0.25 TABLET, FILM COATED ORAL at 20:14

## 2023-12-01 RX ADMIN — HYDROMORPHONE HYDROCHLORIDE 1 MG: 1 INJECTION, SOLUTION INTRAMUSCULAR; INTRAVENOUS; SUBCUTANEOUS at 06:24

## 2023-12-01 RX ADMIN — FLUDROCORTISONE ACETATE 0.2 MG: 0.1 TABLET ORAL at 08:14

## 2023-12-01 RX ADMIN — RISPERIDONE 0.5 MG: 0.25 TABLET, FILM COATED ORAL at 08:13

## 2023-12-01 RX ADMIN — TRAZODONE HYDROCHLORIDE 150 MG: 50 TABLET ORAL at 20:13

## 2023-12-01 RX ADMIN — HYDROMORPHONE HYDROCHLORIDE 1 MG: 1 INJECTION, SOLUTION INTRAMUSCULAR; INTRAVENOUS; SUBCUTANEOUS at 00:28

## 2023-12-01 RX ADMIN — SODIUM CHLORIDE, PRESERVATIVE FREE 10 ML: 5 INJECTION INTRAVENOUS at 08:43

## 2023-12-01 RX ADMIN — PROPRANOLOL HYDROCHLORIDE 10 MG: 10 TABLET ORAL at 08:14

## 2023-12-01 RX ADMIN — PIPERACILLIN AND TAZOBACTAM 4500 MG: 4; .5 INJECTION, POWDER, FOR SOLUTION INTRAVENOUS at 18:01

## 2023-12-01 ASSESSMENT — PAIN DESCRIPTION - ORIENTATION
ORIENTATION: MID
ORIENTATION: MID
ORIENTATION: MID;LOWER
ORIENTATION: LOWER;MID
ORIENTATION: LEFT;LOWER
ORIENTATION: RIGHT;LOWER;MID
ORIENTATION: LEFT;LOWER
ORIENTATION: MID
ORIENTATION: MID

## 2023-12-01 ASSESSMENT — PAIN DESCRIPTION - PAIN TYPE
TYPE: SURGICAL PAIN
TYPE: SURGICAL PAIN

## 2023-12-01 ASSESSMENT — PAIN DESCRIPTION - LOCATION
LOCATION: ABDOMEN

## 2023-12-01 ASSESSMENT — PAIN - FUNCTIONAL ASSESSMENT
PAIN_FUNCTIONAL_ASSESSMENT: ACTIVITIES ARE NOT PREVENTED

## 2023-12-01 ASSESSMENT — PAIN SCALES - GENERAL
PAINLEVEL_OUTOF10: 7
PAINLEVEL_OUTOF10: 6
PAINLEVEL_OUTOF10: 9
PAINLEVEL_OUTOF10: 7
PAINLEVEL_OUTOF10: 8
PAINLEVEL_OUTOF10: 8
PAINLEVEL_OUTOF10: 6
PAINLEVEL_OUTOF10: 7
PAINLEVEL_OUTOF10: 7
PAINLEVEL_OUTOF10: 9
PAINLEVEL_OUTOF10: 8
PAINLEVEL_OUTOF10: 9
PAINLEVEL_OUTOF10: 7
PAINLEVEL_OUTOF10: 8
PAINLEVEL_OUTOF10: 6
PAINLEVEL_OUTOF10: 8
PAINLEVEL_OUTOF10: 9

## 2023-12-01 ASSESSMENT — PAIN DESCRIPTION - ONSET
ONSET: ON-GOING
ONSET: ON-GOING

## 2023-12-01 ASSESSMENT — PAIN DESCRIPTION - DESCRIPTORS
DESCRIPTORS: JABBING
DESCRIPTORS: SHARP
DESCRIPTORS: ACHING
DESCRIPTORS: ACHING
DESCRIPTORS: ACHING;DISCOMFORT;SORE
DESCRIPTORS: ACHING
DESCRIPTORS: ACHING;DISCOMFORT;SORE
DESCRIPTORS: ACHING
DESCRIPTORS: SHARP
DESCRIPTORS: ACHING;DISCOMFORT;SORE

## 2023-12-01 ASSESSMENT — PAIN DESCRIPTION - FREQUENCY
FREQUENCY: CONTINUOUS
FREQUENCY: CONTINUOUS

## 2023-12-01 NOTE — PROGRESS NOTES
Comprehensive Nutrition Assessment    Type and Reason for Visit:  Reassess    Nutrition Recommendations/Plan:   Continue current TPN macronutrients (89 kgm dosing wt; 23 kcals/kgm, 1.7 gm protein/kgm and 30% kcals from lipids) - at goal.  Recommend advance diet as GI function allows - currently minimal intake, c/o N/V. Recommend continue PN until tolerance of FL diet or greater is established. Malnutrition Assessment:  Malnutrition Status: At risk for malnutrition (Comment) (11/27/23 5250)    Context:  Acute Illness     Findings of the 6 clinical characteristics of malnutrition:  Energy Intake:  50% or less of estimated energy requirements for 5 or more days  Weight Loss:  No significant weight loss     Body Fat Loss:  No significant body fat loss     Muscle Mass Loss:  No significant muscle mass loss    Fluid Accumulation:  Unable to assess     Strength:  Not Performed    Nutrition Assessment:      At risk for nutrition compromise r/t altered GI function - admit with diverticulitis, 11/17/23 sigmoid colectomy, 11/21 diagnostic laparoscopy, 11/24 colonic anastomotic resection and end colostomy, NPO status (TPN initiated 11/25) and underlying medical condition (POTS, PTSD, HTN, vapes nicotine). Nutrition Related Findings:      Wound Type: Surgical Incision (abdomen incision: 11/17 sigmoid colectomy; 11/24 colonic anastomotic resection and end colostomy)     Pt. Report/Treatments/Miscellaneous: Pt. Seen - reports \"I've had better days\"; c/o nausea - reports worse since NGT removed this am; states vomited this am; reports \"I think I drank too much\"; reports sipping on Spirte and water; consumed 1-25% of jello 11/30; CT abd 11/30- ascites, fluid collection; ? IR to place drain; pt.  Requesting nausea medication - notified RN  GI Status: 624 ml ostomy output past 24 hours  Pertinent Labs: 12/1: Glucose 141, BUN 12, Cr 0.6, Potassium 3.7, Magnesium 2.2, Phosphorus 3.7, Sodium 133  Pertinent Meds: Movantik,

## 2023-12-01 NOTE — PLAN OF CARE
Problem: Discharge Planning  Goal: Discharge to home or other facility with appropriate resources  11/30/2023 2151 by Keyanna Bills RN  Outcome: Progressing  Flowsheets (Taken 11/30/2023 0300 by Indu Karimi RN)  Discharge to home or other facility with appropriate resources:   Identify barriers to discharge with patient and caregiver   Arrange for needed discharge resources and transportation as appropriate   Identify discharge learning needs (meds, wound care, etc)   Refer to discharge planning if patient needs post-hospital services based on physician order or complex needs related to functional status, cognitive ability or social support system  Note: Feedback readiness for discharge. Promoting inclusion for discharge planning. 11/30/2023 1254 by Babak Pacheco RN  Outcome: Progressing     Problem: Pain  Goal: Verbalizes/displays adequate comfort level or baseline comfort level  11/30/2023 2151 by Keyanna Bills RN  Outcome: Progressing  Flowsheets (Taken 11/30/2023 0300 by Indu Karimi RN)  Verbalizes/displays adequate comfort level or baseline comfort level:   Encourage patient to monitor pain and request assistance   Assess pain using appropriate pain scale   Administer analgesics based on type and severity of pain and evaluate response   Implement non-pharmacological measures as appropriate and evaluate response   Notify Licensed Independent Practitioner if interventions unsuccessful or patient reports new pain  Note: Utilize correct pain assessment tool based on patient abilities. Use appropriate pain medications base on appropriate tools. Utilize non-pharmacologic pain reduction methods to supplement ordered pain medications. Educate patient on pain control. Educate patient on acceptable pain level with chronic pain. Talk to patient about non-pharmaceutical pain interventions. Encourage use of medication when needed. Promote rest and distractions from pain.    11/30/2023 1254 by Eriberto Ochoa RN)  Absence of infection during hospitalization:   Assess and monitor for signs and symptoms of infection   Monitor lab/diagnostic results   Monitor all insertion sites i.e., indwelling lines, tubes and drains   Allen appropriate cooling/warming therapies per order   Administer medications as ordered   Instruct and encourage patient and family to use good hand hygiene technique   Identify and instruct in appropriate isolation precautions for identified infection/condition     Problem: Metabolic/Fluid and Electrolytes - Adult  Goal: Hemodynamic stability and optimal renal function maintained  Outcome: Progressing  Flowsheets (Taken 11/30/2023 0300 by Eriberto Ochoa RN)  Hemodynamic stability and optimal renal function maintained:   Monitor labs and assess for signs and symptoms of volume excess or deficit   Monitor intake, output and patient weight   Monitor urine specific gravity, serum osmolarity and serum sodium as indicated or ordered   Monitor response to interventions for patient's volume status, including labs, urine output, blood pressure (other measures as available)   Instruct patient on fluid and nutrition restrictions as appropriate  Goal: Glucose maintained within prescribed range  Outcome: Progressing  Flowsheets (Taken 11/30/2023 0300 by Eriberto Ochoa RN)  Glucose maintained within prescribed range:   Monitor blood glucose as ordered   Assess for signs and symptoms of hyperglycemia and hypoglycemia   Administer ordered medications to maintain glucose within target range   Assess barriers to adequate nutritional intake and initiate nutrition consult as needed   Instruct patient on self management of diabetes and initiate consult as needed     Problem: Hematologic - Adult  Goal: Maintains hematologic stability  Outcome: Progressing  Flowsheets (Taken 11/30/2023 0300 by Eriberto Ochoa RN)  Maintains hematologic stability: Assess for signs and symptoms of bleeding

## 2023-12-01 NOTE — PROGRESS NOTES
1205 Case reviewed with Dr Saqib Larose had Lovenox which is a 24 hour hold for procedures. Called Dr Yusra Amezcua and informed  pt had Lovenox. Dr Yusra Amezcua was informed that Dr Yoselin Stanley is on call on Saturday/Sunday and he may call him if he wishes to proceed with abcess drain otherwise IR staff will check on pt's [facility] and condition on Monday.

## 2023-12-01 NOTE — PROGRESS NOTES
Radhika Rodriguez MD  [facility]  General Surgery Postoperative Progress Note    Pt Name: Tasneem Cazares  Medical Record Number: [id number]  Date of Birth 1995   Today's Date: 12/1/2023    CC:No chief complaint on file. ASSESSMENT   POD # 14 status post robotic assisted sigmoid colectomy and POD #11 status post diagnostic laparoscopy with evaluation of anastomosis and POD# 6 this post exploratory laparotomy with washout and diverting colostomy  HD # 14  Tolerated NG clamped more than 2 days and we gave him full 3 doses of oral contrast yesterday for his CAT scan which he did find with so I removed his NG tube today  Hemoglobin lower at 7.3 he really has no obvious signs of bleeding some not sure whether this is dilutional  or lab error we will just repeat CBC tomorrow  PLAN   Advance diet to full liquids  Would like to add Reglan but since he is on respite all there is a much higher risk of experimental symptoms neuroleptic malignant syndrome so will not start  Bilirubin total has stabilized in the low to mid 2 range will follow  WBC increased to 16,000 on Zosyn, no fever recorded, right lower quadrant drain has become serous from purulent we will plan CT scan abdomen and pelvis with IV and oral contrast if he will tolerate it to evaluate for any intra-abdominal pathology requiring intervention or reoperation. CT scan reviewed there is some fluid collection up in the left mid quadrant close to the left upper quadrant drain but it does not appear to me indicate it is not overwhelmingly obvious an abscess and I do not see an obvious abscess at this point.   In addition he has some atelectasis and a pleural effusion on the left but I do not really think this is pneumonia at this point we will just see what his white count does overnight if it continues to rise I would then ask IR to place a drain in that left mid quadrant fluid collection that is not draining  White count slightly higher even

## 2023-12-01 NOTE — PROGRESS NOTES
Pharmacy Consult for TPN/PPN Monitoring & Adjustment  IV Access: central     Dosing weight: 89 kg  Current insulin regimen: low sliding scale  Diet (excluding TPN): Full liquid diet - complaining of n/v, minimal intake  Maintenance fluid: none    Plan:  See components of tonight's TPN bag in the table below:     Date 11/27/23 11/28/23 11/29/23 11/30/2023 12/1/2023   Total kcal/kg 15 20 20 23 23   Total kcal 1335 1780 1780 2047 2047   Protein g/kg 1.5 1.7 1.7 1.7 1.7   Protein g 133.5 151.3 151.3 151.3 151.3   Protein kcal (4 kcal/g) 534 605.2 605.2 605.2 605.2   Lipid % 30 30 30 30 30   Lipid g 40 53.4 53.4 61.4 61.4   Lipid kcal (10 kcal/g) 400.5 534 534 614.1 614.1   Dextrose g 117.8 188. 5 188.5 243.4 243.4   Dextrose kcal (3.4 kcal/g) 400.5 640.8 640.8 827.7 827.7   Infusion Rate (mL/hr) 115 115 115 115 115   Na Acetate (mEq) 40 40 40 40 40   Na Chloride (mEq) 180 180 180 220 260   Na Phos (mmol)  (3 mmol = 4 mEq Na) 0 0 0 0 0   Total Na (mEq) 220 220 220 260 300   K Acetate (mEq) 52 52 72 72 72   K Chloride (mEq) 0 0 0 0 0   K Phos (mmol)  (15 mmol = 22 mEq K) 15 15 15 15 15   Total K (mEq) 74 74 94 94 94   Ca Gluconate (mEq)  (1 g = 4.65 mEq) 4.65 4.65 4.65 9.3 9.3   Mag Sulfate (mEq)  (1 g = 8.12 mEq) 8.12 8.12 8.12 8.12 8.12   Multivitamin (mL) 10 10 10 10 10   Trace Elements (mL) 1 1 1 1 1      Summary of changes for tonight's TPN:   No macronutrient changes, increase NaCl    Electrolyte Replacement: none    Monitoring:  BMP, Mag, iCal, & Phos ordered for tomorrow with AM labs. Pharmacy will continue to follow daily. Thank you for the consult.   Misha Rebolledo, PharmD, BCPS 12/1/2023 12:30 PM

## 2023-12-02 ENCOUNTER — APPOINTMENT (OUTPATIENT)
Dept: CT IMAGING | Age: 28
DRG: 231 | End: 2023-12-02
Attending: SURGERY
Payer: COMMERCIAL

## 2023-12-02 LAB
ANION GAP SERPL CALC-SCNC: 10 MEQ/L (ref 8–16)
BASOPHILS ABSOLUTE: 0 THOU/MM3 (ref 0–0.1)
BASOPHILS NFR BLD AUTO: 0.2 %
BUN SERPL-MCNC: 14 MG/DL (ref 7–22)
CA-I BLD ISE-SCNC: 1.07 MMOL/L (ref 1.12–1.32)
CALCIUM SERPL-MCNC: 7.6 MG/DL (ref 8.5–10.5)
CHLORIDE SERPL-SCNC: 99 MEQ/L (ref 98–111)
CO2 SERPL-SCNC: 24 MEQ/L (ref 23–33)
CREAT SERPL-MCNC: 0.6 MG/DL (ref 0.4–1.2)
DEPRECATED RDW RBC AUTO: 46.9 FL (ref 35–45)
EOSINOPHIL NFR BLD AUTO: 1 %
EOSINOPHILS ABSOLUTE: 0.2 THOU/MM3 (ref 0–0.4)
ERYTHROCYTE [DISTWIDTH] IN BLOOD BY AUTOMATED COUNT: 14.6 % (ref 11.5–14.5)
GFR SERPL CREATININE-BSD FRML MDRD: > 60 ML/MIN/1.73M2
GLUCOSE BLD STRIP.AUTO-MCNC: 117 MG/DL (ref 70–108)
GLUCOSE BLD STRIP.AUTO-MCNC: 126 MG/DL (ref 70–108)
GLUCOSE BLD STRIP.AUTO-MCNC: 133 MG/DL (ref 70–108)
GLUCOSE BLD STRIP.AUTO-MCNC: 136 MG/DL (ref 70–108)
GLUCOSE BLD STRIP.AUTO-MCNC: 142 MG/DL (ref 70–108)
GLUCOSE SERPL-MCNC: 136 MG/DL (ref 70–108)
HCT VFR BLD AUTO: 22.8 % (ref 42–52)
HGB BLD-MCNC: 7.4 GM/DL (ref 14–18)
IMM GRANULOCYTES # BLD AUTO: 2.15 THOU/MM3 (ref 0–0.07)
IMM GRANULOCYTES NFR BLD AUTO: 10.3 %
LYMPHOCYTES ABSOLUTE: 2.4 THOU/MM3 (ref 1–4.8)
LYMPHOCYTES NFR BLD AUTO: 11.4 %
MAGNESIUM SERPL-MCNC: 2.1 MG/DL (ref 1.6–2.4)
MCH RBC QN AUTO: 28.8 PG (ref 26–33)
MCHC RBC AUTO-ENTMCNC: 32.5 GM/DL (ref 32.2–35.5)
MCV RBC AUTO: 88.7 FL (ref 80–94)
MONOCYTES ABSOLUTE: 3 THOU/MM3 (ref 0.4–1.3)
MONOCYTES NFR BLD AUTO: 14.2 %
NEUTROPHILS NFR BLD AUTO: 62.9 %
NRBC BLD AUTO-RTO: 0 /100 WBC
PHOSPHATE SERPL-MCNC: 3.7 MG/DL (ref 2.4–4.7)
PLATELET # BLD AUTO: 387 THOU/MM3 (ref 130–400)
PMV BLD AUTO: 11.8 FL (ref 9.4–12.4)
POLYCHROMASIA BLD QL SMEAR: ABNORMAL
POTASSIUM SERPL-SCNC: 3.9 MEQ/L (ref 3.5–5.2)
RBC # BLD AUTO: 2.57 MILL/MM3 (ref 4.7–6.1)
SCAN OF BLOOD SMEAR: NORMAL
SEGMENTED NEUTROPHILS ABSOLUTE COUNT: 13.1 THOU/MM3 (ref 1.8–7.7)
SODIUM SERPL-SCNC: 133 MEQ/L (ref 135–145)
WBC # BLD AUTO: 20.8 THOU/MM3 (ref 4.8–10.8)

## 2023-12-02 PROCEDURE — 6360000002 HC RX W HCPCS: Performed by: RADIOLOGY

## 2023-12-02 PROCEDURE — 1200000000 HC SEMI PRIVATE

## 2023-12-02 PROCEDURE — 2580000003 HC RX 258: Performed by: SURGERY

## 2023-12-02 PROCEDURE — 6370000000 HC RX 637 (ALT 250 FOR IP): Performed by: SURGERY

## 2023-12-02 PROCEDURE — 87186 SC STD MICRODIL/AGAR DIL: CPT

## 2023-12-02 PROCEDURE — A4216 STERILE WATER/SALINE, 10 ML: HCPCS | Performed by: SURGERY

## 2023-12-02 PROCEDURE — APPSS45 APP SPLIT SHARED TIME 31-45 MINUTES: Performed by: NURSE PRACTITIONER

## 2023-12-02 PROCEDURE — 6360000002 HC RX W HCPCS: Performed by: SURGERY

## 2023-12-02 PROCEDURE — 87075 CULTR BACTERIA EXCEPT BLOOD: CPT

## 2023-12-02 PROCEDURE — 2580000003 HC RX 258: Performed by: NURSE PRACTITIONER

## 2023-12-02 PROCEDURE — 36415 COLL VENOUS BLD VENIPUNCTURE: CPT

## 2023-12-02 PROCEDURE — 2709999900 CT GUIDED NEEDLE PLACEMENT

## 2023-12-02 PROCEDURE — 99024 POSTOP FOLLOW-UP VISIT: CPT | Performed by: NURSE PRACTITIONER

## 2023-12-02 PROCEDURE — 74177 CT ABD & PELVIS W/CONTRAST: CPT

## 2023-12-02 PROCEDURE — 2500000003 HC RX 250 WO HCPCS: Performed by: PHARMACIST

## 2023-12-02 PROCEDURE — 87077 CULTURE AEROBIC IDENTIFY: CPT

## 2023-12-02 PROCEDURE — 83735 ASSAY OF MAGNESIUM: CPT

## 2023-12-02 PROCEDURE — 82330 ASSAY OF CALCIUM: CPT

## 2023-12-02 PROCEDURE — 6360000002 HC RX W HCPCS: Performed by: NURSE PRACTITIONER

## 2023-12-02 PROCEDURE — 6370000000 HC RX 637 (ALT 250 FOR IP): Performed by: NURSE PRACTITIONER

## 2023-12-02 PROCEDURE — 75989 ABSCESS DRAINAGE UNDER X-RAY: CPT

## 2023-12-02 PROCEDURE — 87070 CULTURE OTHR SPECIMN AEROBIC: CPT

## 2023-12-02 PROCEDURE — 0W9F30Z DRAINAGE OF ABDOMINAL WALL WITH DRAINAGE DEVICE, PERCUTANEOUS APPROACH: ICD-10-PCS | Performed by: RADIOLOGY

## 2023-12-02 PROCEDURE — 85025 COMPLETE CBC W/AUTO DIFF WBC: CPT

## 2023-12-02 PROCEDURE — 2500000003 HC RX 250 WO HCPCS: Performed by: SURGERY

## 2023-12-02 PROCEDURE — 82948 REAGENT STRIP/BLOOD GLUCOSE: CPT

## 2023-12-02 PROCEDURE — 80048 BASIC METABOLIC PNL TOTAL CA: CPT

## 2023-12-02 PROCEDURE — 87205 SMEAR GRAM STAIN: CPT

## 2023-12-02 PROCEDURE — 6360000004 HC RX CONTRAST MEDICATION: Performed by: NURSE PRACTITIONER

## 2023-12-02 PROCEDURE — 2580000003 HC RX 258: Performed by: PHARMACIST

## 2023-12-02 PROCEDURE — 6360000002 HC RX W HCPCS: Performed by: PHARMACIST

## 2023-12-02 PROCEDURE — 84100 ASSAY OF PHOSPHORUS: CPT

## 2023-12-02 RX ORDER — OXYCODONE HYDROCHLORIDE 5 MG/1
5 TABLET ORAL EVERY 4 HOURS PRN
Status: DISCONTINUED | OUTPATIENT
Start: 2023-12-02 | End: 2023-12-02

## 2023-12-02 RX ORDER — MIDAZOLAM HYDROCHLORIDE 1 MG/ML
INJECTION INTRAMUSCULAR; INTRAVENOUS PRN
Status: COMPLETED | OUTPATIENT
Start: 2023-12-02 | End: 2023-12-02

## 2023-12-02 RX ORDER — OXYCODONE HYDROCHLORIDE 5 MG/1
10 TABLET ORAL EVERY 4 HOURS PRN
Status: DISCONTINUED | OUTPATIENT
Start: 2023-12-02 | End: 2023-12-02

## 2023-12-02 RX ORDER — POTASSIUM CHLORIDE 20 MEQ/1
20 TABLET, EXTENDED RELEASE ORAL ONCE
Status: COMPLETED | OUTPATIENT
Start: 2023-12-02 | End: 2023-12-02

## 2023-12-02 RX ORDER — LORAZEPAM 2 MG/ML
0.25 INJECTION INTRAMUSCULAR EVERY 8 HOURS PRN
Status: DISCONTINUED | OUTPATIENT
Start: 2023-12-02 | End: 2023-12-07 | Stop reason: HOSPADM

## 2023-12-02 RX ORDER — FUROSEMIDE 10 MG/ML
20 INJECTION INTRAMUSCULAR; INTRAVENOUS ONCE
Status: COMPLETED | OUTPATIENT
Start: 2023-12-02 | End: 2023-12-02

## 2023-12-02 RX ORDER — OXYCODONE HYDROCHLORIDE 5 MG/1
5 TABLET ORAL EVERY 6 HOURS PRN
Status: DISCONTINUED | OUTPATIENT
Start: 2023-12-02 | End: 2023-12-07 | Stop reason: HOSPADM

## 2023-12-02 RX ORDER — CALCIUM GLUCONATE 20 MG/ML
2000 INJECTION, SOLUTION INTRAVENOUS ONCE
Status: COMPLETED | OUTPATIENT
Start: 2023-12-02 | End: 2023-12-02

## 2023-12-02 RX ORDER — FENTANYL CITRATE 50 UG/ML
INJECTION, SOLUTION INTRAMUSCULAR; INTRAVENOUS PRN
Status: COMPLETED | OUTPATIENT
Start: 2023-12-02 | End: 2023-12-02

## 2023-12-02 RX ADMIN — RISPERIDONE 0.5 MG: 0.25 TABLET, FILM COATED ORAL at 23:08

## 2023-12-02 RX ADMIN — ONDANSETRON 4 MG: 2 INJECTION INTRAMUSCULAR; INTRAVENOUS at 08:29

## 2023-12-02 RX ADMIN — PIPERACILLIN AND TAZOBACTAM 4500 MG: 4; .5 INJECTION, POWDER, FOR SOLUTION INTRAVENOUS at 18:21

## 2023-12-02 RX ADMIN — VENLAFAXINE HYDROCHLORIDE 150 MG: 150 CAPSULE, EXTENDED RELEASE ORAL at 23:08

## 2023-12-02 RX ADMIN — CALCIUM GLUCONATE: 98 INJECTION, SOLUTION INTRAVENOUS at 18:20

## 2023-12-02 RX ADMIN — LORAZEPAM 0.5 MG: 2 INJECTION INTRAMUSCULAR; INTRAVENOUS at 06:55

## 2023-12-02 RX ADMIN — HYDROMORPHONE HYDROCHLORIDE 0.5 MG: 1 INJECTION, SOLUTION INTRAMUSCULAR; INTRAVENOUS; SUBCUTANEOUS at 22:36

## 2023-12-02 RX ADMIN — FUROSEMIDE 20 MG: 10 INJECTION, SOLUTION INTRAMUSCULAR; INTRAVENOUS at 12:02

## 2023-12-02 RX ADMIN — PROPRANOLOL HYDROCHLORIDE 10 MG: 10 TABLET ORAL at 08:28

## 2023-12-02 RX ADMIN — OXYCODONE HYDROCHLORIDE 5 MG: 5 TABLET ORAL at 10:14

## 2023-12-02 RX ADMIN — HYDROMORPHONE HYDROCHLORIDE 1 MG: 1 INJECTION, SOLUTION INTRAMUSCULAR; INTRAVENOUS; SUBCUTANEOUS at 04:17

## 2023-12-02 RX ADMIN — BUSPIRONE HYDROCHLORIDE 10 MG: 10 TABLET ORAL at 08:29

## 2023-12-02 RX ADMIN — HYDROMORPHONE HYDROCHLORIDE 1 MG: 1 INJECTION, SOLUTION INTRAMUSCULAR; INTRAVENOUS; SUBCUTANEOUS at 00:13

## 2023-12-02 RX ADMIN — CALCIUM GLUCONATE 2000 MG: 20 INJECTION, SOLUTION INTRAVENOUS at 11:46

## 2023-12-02 RX ADMIN — FENTANYL CITRATE 50 MCG: 50 INJECTION, SOLUTION INTRAMUSCULAR; INTRAVENOUS at 17:40

## 2023-12-02 RX ADMIN — HYDROMORPHONE HYDROCHLORIDE 1 MG: 1 INJECTION, SOLUTION INTRAMUSCULAR; INTRAVENOUS; SUBCUTANEOUS at 06:18

## 2023-12-02 RX ADMIN — SODIUM CHLORIDE, PRESERVATIVE FREE 20 MG: 5 INJECTION INTRAVENOUS at 08:28

## 2023-12-02 RX ADMIN — HYOSCYAMINE SULFATE 125 MCG: 0.12 TABLET, ORALLY DISINTEGRATING ORAL at 08:28

## 2023-12-02 RX ADMIN — PROPRANOLOL HYDROCHLORIDE 10 MG: 10 TABLET ORAL at 23:08

## 2023-12-02 RX ADMIN — LORAZEPAM 0.26 MG: 2 INJECTION INTRAMUSCULAR; INTRAVENOUS at 20:39

## 2023-12-02 RX ADMIN — PIPERACILLIN AND TAZOBACTAM 4500 MG: 4; .5 INJECTION, POWDER, FOR SOLUTION INTRAVENOUS at 11:46

## 2023-12-02 RX ADMIN — IOPAMIDOL 80 ML: 755 INJECTION, SOLUTION INTRAVENOUS at 10:34

## 2023-12-02 RX ADMIN — MIDAZOLAM 1 MG: 1 INJECTION INTRAMUSCULAR; INTRAVENOUS at 17:16

## 2023-12-02 RX ADMIN — OXYCODONE HYDROCHLORIDE 5 MG: 5 TABLET ORAL at 16:02

## 2023-12-02 RX ADMIN — SODIUM CHLORIDE, PRESERVATIVE FREE 20 MG: 5 INJECTION INTRAVENOUS at 23:06

## 2023-12-02 RX ADMIN — FENTANYL CITRATE 50 MCG: 50 INJECTION, SOLUTION INTRAMUSCULAR; INTRAVENOUS at 17:16

## 2023-12-02 RX ADMIN — HYDROMORPHONE HYDROCHLORIDE 0.5 MG: 1 INJECTION, SOLUTION INTRAMUSCULAR; INTRAVENOUS; SUBCUTANEOUS at 18:41

## 2023-12-02 RX ADMIN — HYDROMORPHONE HYDROCHLORIDE 0.5 MG: 1 INJECTION, SOLUTION INTRAMUSCULAR; INTRAVENOUS; SUBCUTANEOUS at 14:22

## 2023-12-02 RX ADMIN — FENTANYL CITRATE 50 MCG: 50 INJECTION, SOLUTION INTRAMUSCULAR; INTRAVENOUS at 17:23

## 2023-12-02 RX ADMIN — HYDROMORPHONE HYDROCHLORIDE 1 MG: 1 INJECTION, SOLUTION INTRAMUSCULAR; INTRAVENOUS; SUBCUTANEOUS at 12:02

## 2023-12-02 RX ADMIN — BUSPIRONE HYDROCHLORIDE 10 MG: 10 TABLET ORAL at 23:08

## 2023-12-02 RX ADMIN — METOPROLOL SUCCINATE 25 MG: 25 TABLET, EXTENDED RELEASE ORAL at 08:29

## 2023-12-02 RX ADMIN — OXYCODONE HYDROCHLORIDE 5 MG: 5 TABLET ORAL at 23:34

## 2023-12-02 RX ADMIN — METOPROLOL TARTRATE 5 MG: 5 INJECTION, SOLUTION INTRAVENOUS at 22:32

## 2023-12-02 RX ADMIN — FLUDROCORTISONE ACETATE 0.2 MG: 0.1 TABLET ORAL at 08:28

## 2023-12-02 RX ADMIN — HYDROMORPHONE HYDROCHLORIDE 1 MG: 1 INJECTION, SOLUTION INTRAMUSCULAR; INTRAVENOUS; SUBCUTANEOUS at 09:02

## 2023-12-02 RX ADMIN — RISPERIDONE 0.5 MG: 0.25 TABLET, FILM COATED ORAL at 08:28

## 2023-12-02 RX ADMIN — TRAZODONE HYDROCHLORIDE 150 MG: 50 TABLET ORAL at 23:18

## 2023-12-02 RX ADMIN — HYDROMORPHONE HYDROCHLORIDE 1 MG: 1 INJECTION, SOLUTION INTRAMUSCULAR; INTRAVENOUS; SUBCUTANEOUS at 02:19

## 2023-12-02 RX ADMIN — Medication 6 MG: at 23:06

## 2023-12-02 RX ADMIN — MIDAZOLAM 1 MG: 1 INJECTION INTRAMUSCULAR; INTRAVENOUS at 17:40

## 2023-12-02 RX ADMIN — MIDAZOLAM 1 MG: 1 INJECTION INTRAMUSCULAR; INTRAVENOUS at 17:23

## 2023-12-02 RX ADMIN — SODIUM CHLORIDE, PRESERVATIVE FREE 10 ML: 5 INJECTION INTRAVENOUS at 23:19

## 2023-12-02 RX ADMIN — NALOXEGOL OXALATE 12.5 MG: 12.5 TABLET, FILM COATED ORAL at 06:42

## 2023-12-02 RX ADMIN — PIPERACILLIN AND TAZOBACTAM 4500 MG: 4; .5 INJECTION, POWDER, FOR SOLUTION INTRAVENOUS at 03:31

## 2023-12-02 RX ADMIN — POTASSIUM CHLORIDE 20 MEQ: 1500 TABLET, EXTENDED RELEASE ORAL at 12:02

## 2023-12-02 RX ADMIN — SODIUM CHLORIDE, PRESERVATIVE FREE 10 ML: 5 INJECTION INTRAVENOUS at 08:29

## 2023-12-02 ASSESSMENT — PAIN - FUNCTIONAL ASSESSMENT
PAIN_FUNCTIONAL_ASSESSMENT: ACTIVITIES ARE NOT PREVENTED
PAIN_FUNCTIONAL_ASSESSMENT: PREVENTS OR INTERFERES SOME ACTIVE ACTIVITIES AND ADLS
PAIN_FUNCTIONAL_ASSESSMENT: ACTIVITIES ARE NOT PREVENTED
PAIN_FUNCTIONAL_ASSESSMENT: PREVENTS OR INTERFERES SOME ACTIVE ACTIVITIES AND ADLS
PAIN_FUNCTIONAL_ASSESSMENT: PREVENTS OR INTERFERES SOME ACTIVE ACTIVITIES AND ADLS
PAIN_FUNCTIONAL_ASSESSMENT: ACTIVITIES ARE NOT PREVENTED

## 2023-12-02 ASSESSMENT — PAIN SCALES - GENERAL
PAINLEVEL_OUTOF10: 5
PAINLEVEL_OUTOF10: 9
PAINLEVEL_OUTOF10: 9
PAINLEVEL_OUTOF10: 6
PAINLEVEL_OUTOF10: 8
PAINLEVEL_OUTOF10: 9
PAINLEVEL_OUTOF10: 5
PAINLEVEL_OUTOF10: 7
PAINLEVEL_OUTOF10: 9
PAINLEVEL_OUTOF10: 9
PAINLEVEL_OUTOF10: 8
PAINLEVEL_OUTOF10: 10
PAINLEVEL_OUTOF10: 8
PAINLEVEL_OUTOF10: 9
PAINLEVEL_OUTOF10: 6
PAINLEVEL_OUTOF10: 8
PAINLEVEL_OUTOF10: 9
PAINLEVEL_OUTOF10: 7

## 2023-12-02 ASSESSMENT — PAIN DESCRIPTION - DESCRIPTORS
DESCRIPTORS: STABBING
DESCRIPTORS: THROBBING
DESCRIPTORS: ACHING;SHARP
DESCRIPTORS: ACHING;SHARP
DESCRIPTORS: THROBBING;OTHER (COMMENT)
DESCRIPTORS: ACHING
DESCRIPTORS: OTHER (COMMENT)
DESCRIPTORS: ACHING
DESCRIPTORS: STABBING
DESCRIPTORS: SHARP;DISCOMFORT

## 2023-12-02 ASSESSMENT — PAIN DESCRIPTION - LOCATION
LOCATION: ABDOMEN

## 2023-12-02 ASSESSMENT — PAIN DESCRIPTION - FREQUENCY
FREQUENCY: CONTINUOUS
FREQUENCY: CONTINUOUS

## 2023-12-02 ASSESSMENT — PAIN DESCRIPTION - ONSET: ONSET: ON-GOING

## 2023-12-02 ASSESSMENT — PAIN DESCRIPTION - ORIENTATION
ORIENTATION: MID
ORIENTATION: LEFT
ORIENTATION: MID
ORIENTATION: MID
ORIENTATION: LEFT
ORIENTATION: LEFT
ORIENTATION: MID
ORIENTATION: LEFT
ORIENTATION: LEFT

## 2023-12-02 ASSESSMENT — PAIN DESCRIPTION - PAIN TYPE: TYPE: SURGICAL PAIN

## 2023-12-02 NOTE — PROGRESS NOTES
[facility]  General Surgery Postoperative Progress Note  Dr. Mendoza Mems Covering for Dr. Tete Hilliard    Pt Name: Aury Malone Record Number: [id number]  Date of Birth 1995   Today's Date: 12/2/2023    CC: Abdominal pain    ASSESSMENT   POD # 15 status post robotic assisted sigmoid colectomy and POD #12 status post diagnostic laparoscopy with evaluation of anastomosis and POD # 7 status post exploratory laparotomy with washout and diverting colostomy  Left abdominal fluid collection - increasing since prior study   Right lower quadrant wound dehiscence  Acute postoperative pain  Nausea   Bilateral pleural effusions  Morbid obesity (BMI 40)  Generalized edema   PLAN   Repeat CT this morning per radiologist recommendations. Increasing collection to left side of abdomen. Plan on IR drain placement if possible. Cultures at that time. Right lower quadrant wound dehiscence. Remove those staples and culture wound. Wet to dry dressing. Discussed with nursing. Give lasix 20 mg IV today. Will give K+ will as he has been low in the past.  Continue TPN  Liquids as tolerated after drain placement. Ostomy functioning. Pain & nausea control   Continue antibiotics  Continue movantik. Unable to use Reglan as he is at higher risk of experimental symptoms neuroleptic malignant syndrome with his home medications. Up with assistance   DVT prophylaxis with Lovenox - held due to procedure  Wound and ostomy following  Labs reviewed. Replace electrolytes. WBC continues to slowly rise 20.8 today. Hemoglobin 7.4. Continue to monitor closely. Awaiting radiologist recommendations for CT guided drain placement. SUBJECTIVE   Stanislav stable overnight. Feels worse today. He is tachycardic this morning with low grade 100 temps overnight. NG is out but he states he is nauseous. On full liquids. Not eating much but he does drink a lot of liquids still. Abdominal pain is 8-10/10 at al times. Abdomen is edematous. contains foci of air density which could represent a developing abscess. The collection    measures approximately 5.0 x 6.4 x 2.7 cm (series 601, image 42 and series 301, image 66). 3. Trace bilateral pleural effusions and patchy bilateral lower lobe airspace opacities may indicate pneumonia in the appropriate clinical setting. Follow-up to ensure resolution is advised. Diffuse total body wall edema suggesting anasarca is present. 4. Nonspecific bladder wall thickening may be artifactual related to underdistention. Correlate with urinalysis. No renal calculus or obstructive uropathy is observed. **This report has been created using voice recognition software. It may contain minor errors which are inherent in voice recognition technology. **      Final report electronically signed by Dr Cristina Da Silva on 11/24/2023 10:42 AM      XR ABDOMEN FOR NG/OG/NE TUBE PLACEMENT   Final Result      Nasogastric tube as above. Final report electronically signed by Dr. Deni Weiss on 11/22/2023 4:35 PM      XR ABDOMEN FOR NG/OG/NE TUBE PLACEMENT   Final Result      Nasogastric tube as above.       Final report electronically signed by Dr. Deni Weiss on 11/20/2023 4:49 PM      CT GUIDED NEEDLE PLACEMENT    (Results Pending)     Electronically signed by ADRI Glynn CNP on 12/2/2023 at 1:11 PM

## 2023-12-02 NOTE — OP NOTE
Department of Radiology  Post Procedure Progress Note      Pre-Procedure Diagnosis:  Post operative fluid collections    Procedure Performed:  CT guided drain placement to right and left sides of abdomen    Anesthesia: local / versed and fentanyl    Findings: successful    Immediate Complications:  None    Estimated Blood Loss: minimal    SEE DICTATED PROCEDURE NOTE FOR COMPLETE DETAILS.     Electronically signed by Maeve Torres MD on 12/2/2023 at 6:53 PM

## 2023-12-02 NOTE — PROGRESS NOTES
1641 Pt arrived to CT holding. Skin natural for race, warm, dry. Respirations even and unlabored. 1755 [facility] Dr. Pebbles Jackson in to evaluate pt and explain procedure. 1701 Consent obtained. Pt verbalizes agreement of site of procedure and procedure to be performed. 1705 Pt positioned  supine on CT table. Monitor applied. 1713 Pre scans complete. 1720 Procedure started with Dr. Pebbles Jackson  1726 20 ml brown fluid obtained from left abdomen and sent to lab. 1745 Procedure complete. Drains secured with stay fix dressings and covered with opsites, to ana close bags. 15 ml brown fluid obtained from right abdominal drain, sent to lab. 1748 Post scans obtained. 1754 Pt back to cart. Positioned for comfort. 1755 Report called to Thelma Escalera RN.   1800 Pt awaiting transport to  via cart. Skin natural for race, warm, dry. Respirations even and unlabored. No complaints voiced at this time.

## 2023-12-02 NOTE — PLAN OF CARE
Problem: Discharge Planning  Goal: Discharge to home or other facility with appropriate resources  Outcome: Progressing  Flowsheets (Taken 12/2/2023 1804)  Discharge to home or other facility with appropriate resources:   Identify barriers to discharge with patient and caregiver   Identify discharge learning needs (meds, wound care, etc)   Refer to discharge planning if patient needs post-hospital services based on physician order or complex needs related to functional status, cognitive ability or social support system   Arrange for needed discharge resources and transportation as appropriate     Problem: Pain  Goal: Verbalizes/displays adequate comfort level or baseline comfort level  Outcome: Progressing  Flowsheets (Taken 12/2/2023 1804)  Verbalizes/displays adequate comfort level or baseline comfort level:   Encourage patient to monitor pain and request assistance   Administer analgesics based on type and severity of pain and evaluate response   Assess pain using appropriate pain scale   Implement non-pharmacological measures as appropriate and evaluate response   Notify Licensed Independent Practitioner if interventions unsuccessful or patient reports new pain     Problem: ABCDS Injury Assessment  Goal: Absence of physical injury  Outcome: Progressing  Flowsheets (Taken 12/2/2023 1804)  Absence of Physical Injury: Implement safety measures based on patient assessment     Problem: Safety - Adult  Goal: Free from fall injury  Outcome: Progressing  Flowsheets (Taken 12/2/2023 1804)  Free From Fall Injury: Instruct family/caregiver on patient safety     Problem: Skin/Tissue Integrity - Adult  Goal: Skin integrity remains intact  Outcome: Progressing  Flowsheets (Taken 12/2/2023 1804)  Skin Integrity Remains Intact: Monitor for areas of redness and/or skin breakdown     Problem: Skin/Tissue Integrity - Adult  Goal: Incisions, wounds, or drain sites healing without S/S of infection  Outcome: Progressing  Flowsheets Provide emotional support, including active listening and acknowledgement of concerns of patient and caregivers   Reduce environmental stimuli, as able   Instruct patient/family in relaxation techniques, as appropriate     Problem: Nutrition Deficit:  Goal: Optimize nutritional status  Outcome: Progressing  Flowsheets (Taken 12/2/2023 1804)  Nutrient intake appropriate for improving, restoring, or maintaining nutritional needs:   Assess nutritional status and recommend course of action   Monitor oral intake, labs, and treatment plans   Recommend appropriate diets, oral nutritional supplements, and vitamin/mineral supplements   Recommend, monitor, and adjust tube feedings and TPN/PPN based on assessed needs   Provide specific nutrition education to patient or family as appropriate     Problem: Chronic Conditions and Co-morbidities  Goal: Patient's chronic conditions and co-morbidity symptoms are monitored and maintained or improved  Outcome: Progressing  Flowsheets (Taken 12/2/2023 1804)  Care Plan - Patient's Chronic Conditions and Co-Morbidity Symptoms are Monitored and Maintained or Improved:   Monitor and assess patient's chronic conditions and comorbid symptoms for stability, deterioration, or improvement   Collaborate with multidisciplinary team to address chronic and comorbid conditions and prevent exacerbation or deterioration   Update acute care plan with appropriate goals if chronic or comorbid symptoms are exacerbated and prevent overall improvement and discharge   Care plan reviewed with patient. Patient verbalize understanding of the plan of care and contribute to goal setting.

## 2023-12-02 NOTE — H&P
[facility]  Sedation/Analgesia History & Physical    Pt Name: Hernandez Aleman  MRN: [id number]  YOB: 1995  Provider Performing Procedure: Sheridan Senior MD, MD  Primary Care Physician: Meka Rudolph    Formulation and discussion of sedation / procedure plans, risks, benefits, side effects and alternatives with patient and/or responsible adult completed. PRE-PROCEDURE   DNR-CCA/DNR-CC []Yes [x]No  Brief History/Pre-Procedure Diagnosis: Post operative abdominal fluid collections          MEDICAL HISTORY  []CAD/Valve  []Liver Disease  []Lung Disease []Diabetes  []Hypertension []Renal Disease  [x]Additional information:       has a past medical history of Anxiety, Post traumatic stress disorder (PTSD), Postural orthostatic tachycardia syndrome (POTS), Primary hypertension, and Vapes nicotine containing substance. SURGICAL HISTORY   has a past surgical history that includes Vasectomy; lipoma resection (N/A); Colonoscopy (N/A, 8/30/2023); Sigmoid Colectomy (N/A, 11/17/2023); laparoscopy (N/A, 11/20/2023); and laparotomy (N/A, 11/24/2023).   Additional information:       ALLERGIES   Allergies as of 10/25/2023 - Fully Reviewed 10/04/2023   Allergen Reaction Noted    Bee venom Anaphylaxis 07/23/2021    Pineapple  10/25/2023    Shellfish-derived products  05/19/2022     Additional information:       MEDICATIONS   Coumadin Use Last 5 Days [x]No []Yes  Antiplatelet drug therapy use last 5 days  [x]No []Yes  Other anticoagulant use last 5 days  [x]No []Yes    Current Facility-Administered Medications:     PN-Adult  3 IN 1 Central Line (Custom), , IntraVENous, Continuous TPN, Kaylan Portillo [facility], Last Rate: 115 mL/hr at 12/02/23 1820, New Bag at 12/02/23 1820    oxyCODONE (ROXICODONE) immediate release tablet 5 mg, 5 mg, Oral, Q6H PRN, 5 mg at 12/02/23 1602 **OR** [DISCONTINUED] oxyCODONE (ROXICODONE) immediate release tablet 10 mg, 10 mg, Oral, Q4H PRN, ADRI Matias -

## 2023-12-02 NOTE — PROGRESS NOTES
Pharmacy Consult for TPN/PPN Monitoring & Adjustment  IV Access: central      Dosing weight: 89 kg  Current insulin regimen: low sliding scale  Diet (excluding TPN): Full liquid diet  Maintenance fluid: none    Plan:  See components of tonight's TPN bag in the table below:  Date 11/30/2023 12/1/2023 12/2/2023   Total kcal/kg 23 23 23   Total kcal 2047 2047 2047   Protein g/kg 1.7 1.7 1.7   Protein g 151.3 151.3 151.3   Protein kcal (4 kcal/g) 605.2 605.2 605.2   Lipid % 30 30 30   Lipid g 61.4 61.4 61.4   Lipid kcal (10 kcal/g) 614.1 614.1 614.1   Dextrose g 243.4 243.4 243.4   Dextrose kcal (3.4 kcal/g) 827.7 827.7 827.7   Infusion Rate (mL/hr) 115 115 115   Na Acetate (mEq) 40 40 40   Na Chloride (mEq) 220 260 260   Na Phos (mmol)  (3 mmol = 4 mEq Na) 0 0 0   Total Na (mEq) 260 300 300   K Acetate (mEq) 72 72 72   K Chloride (mEq) 0 0 0   K Phos (mmol)  (15 mmol = 22 mEq K) 15 15 15   Total K (mEq) 94 94 94   Ca Gluconate (mEq)  (1 g = 4.65 mEq) 9.3 9.3 9.3   Mag Sulfate (mEq)  (1 g = 8.12 mEq) 8.12 8.12 8.12   Multivitamin (mL) 10 10 10   Trace Elements (mL) 1 1 1     Summary of changes for tonight's TPN: none    Electrolyte Replacement:   Calcium gluconate: 2 g    Monitoring:  BMP, Mag, iCal, & Phos ordered for tomorrow with AM labs. Pharmacy will continue to follow daily. Thank you for the consult.   Guille Mercer, PharmD, BCPS, BCCCP  12/2/2023 9:29 AM

## 2023-12-02 NOTE — PLAN OF CARE
Problem: Discharge Planning  Goal: Discharge to home or other facility with appropriate resources  Outcome: Progressing  Flowsheets (Taken 12/1/2023 2004)  Discharge to home or other facility with appropriate resources:   Arrange for needed discharge resources and transportation as appropriate   Identify barriers to discharge with patient and caregiver   Identify discharge learning needs (meds, wound care, etc)   Refer to discharge planning if patient needs post-hospital services based on physician order or complex needs related to functional status, cognitive ability or social support system     Problem: Pain  Goal: Verbalizes/displays adequate comfort level or baseline comfort level  Outcome: Progressing  Flowsheets (Taken 12/2/2023 0042)  Verbalizes/displays adequate comfort level or baseline comfort level:   Encourage patient to monitor pain and request assistance   Assess pain using appropriate pain scale   Administer analgesics based on type and severity of pain and evaluate response   Implement non-pharmacological measures as appropriate and evaluate response     Problem: ABCDS Injury Assessment  Goal: Absence of physical injury  Outcome: Progressing  Flowsheets (Taken 12/2/2023 0042)  Absence of Physical Injury: Implement safety measures based on patient assessment     Problem: Safety - Adult  Goal: Free from fall injury  Outcome: Progressing  Flowsheets (Taken 12/2/2023 0042)  Free From Fall Injury: Instruct family/caregiver on patient safety     Problem: Skin/Tissue Integrity - Adult  Goal: Skin integrity remains intact  Outcome: Progressing  Flowsheets (Taken 12/1/2023 2004)  Skin Integrity Remains Intact:   Assess vascular access sites hourly   Monitor for areas of redness and/or skin breakdown  Goal: Incisions, wounds, or drain sites healing without S/S of infection  Outcome: Progressing  Flowsheets (Taken 12/1/2023 2004)  Incisions, Wounds, or Drain Sites Healing Without Sign and Symptoms of

## 2023-12-02 NOTE — PROGRESS NOTES
Patient wanted his mother updated about the procedure in IR. This RN updated mother no further questions at this time.

## 2023-12-03 ENCOUNTER — APPOINTMENT (OUTPATIENT)
Dept: GENERAL RADIOLOGY | Age: 28
DRG: 231 | End: 2023-12-03
Attending: SURGERY
Payer: COMMERCIAL

## 2023-12-03 LAB
ANION GAP SERPL CALC-SCNC: 6 MEQ/L (ref 8–16)
BACTERIA: ABNORMAL
BASOPHILS ABSOLUTE: 0 THOU/MM3 (ref 0–0.1)
BASOPHILS ABSOLUTE: 0.1 THOU/MM3 (ref 0–0.1)
BASOPHILS NFR BLD AUTO: 0.3 %
BASOPHILS NFR BLD AUTO: 0.4 %
BILIRUB UR QL STRIP: ABNORMAL
BUN SERPL-MCNC: 11 MG/DL (ref 7–22)
CA-I BLD ISE-SCNC: 1.06 MMOL/L (ref 1.12–1.32)
CA-I BLD ISE-SCNC: 1.14 MMOL/L (ref 1.12–1.32)
CALCIUM SERPL-MCNC: 7.6 MG/DL (ref 8.5–10.5)
CASTS #/AREA URNS LPF: ABNORMAL /LPF
CASTS #/AREA URNS LPF: ABNORMAL /LPF
CHARACTER UR: CLEAR
CHARCOAL URNS QL MICRO: ABNORMAL
CHLORIDE SERPL-SCNC: 99 MEQ/L (ref 98–111)
CO2 SERPL-SCNC: 28 MEQ/L (ref 23–33)
COLOR UR: ABNORMAL
CREAT SERPL-MCNC: 0.6 MG/DL (ref 0.4–1.2)
CRYSTALS URNS QL MICRO: ABNORMAL
DEPRECATED RDW RBC AUTO: 48.1 FL (ref 35–45)
DEPRECATED RDW RBC AUTO: 60.6 FL (ref 35–45)
EOSINOPHIL NFR BLD AUTO: 0.7 %
EOSINOPHIL NFR BLD AUTO: 0.8 %
EOSINOPHILS ABSOLUTE: 0.1 THOU/MM3 (ref 0–0.4)
EOSINOPHILS ABSOLUTE: 0.2 THOU/MM3 (ref 0–0.4)
EPITHELIAL CELLS, UA: ABNORMAL /HPF
ERYTHROCYTE [DISTWIDTH] IN BLOOD BY AUTOMATED COUNT: 14.7 % (ref 11.5–14.5)
ERYTHROCYTE [DISTWIDTH] IN BLOOD BY AUTOMATED COUNT: 16 % (ref 11.5–14.5)
GFR SERPL CREATININE-BSD FRML MDRD: > 60 ML/MIN/1.73M2
GLUCOSE BLD STRIP.AUTO-MCNC: 128 MG/DL (ref 70–108)
GLUCOSE BLD STRIP.AUTO-MCNC: 144 MG/DL (ref 70–108)
GLUCOSE BLD STRIP.AUTO-MCNC: 146 MG/DL (ref 70–108)
GLUCOSE BLD STRIP.AUTO-MCNC: 147 MG/DL (ref 70–108)
GLUCOSE SERPL-MCNC: 145 MG/DL (ref 70–108)
GLUCOSE UR QL STRIP.AUTO: NEGATIVE MG/DL
HCT VFR BLD AUTO: 20.2 % (ref 42–52)
HCT VFR BLD AUTO: 22 % (ref 42–52)
HGB BLD-MCNC: 6.4 GM/DL (ref 14–18)
HGB BLD-MCNC: 7.1 GM/DL (ref 14–18)
HGB UR QL STRIP.AUTO: NEGATIVE
ICTOTEST: NEGATIVE
IMM GRANULOCYTES # BLD AUTO: 1.66 THOU/MM3 (ref 0–0.07)
IMM GRANULOCYTES # BLD AUTO: 2.32 THOU/MM3 (ref 0–0.07)
IMM GRANULOCYTES NFR BLD AUTO: 10.9 %
IMM GRANULOCYTES NFR BLD AUTO: 11.2 %
KETONES UR QL STRIP.AUTO: NEGATIVE
LEUKOCYTE ESTERASE UR QL STRIP.AUTO: NEGATIVE
LYMPHOCYTES ABSOLUTE: 1.8 THOU/MM3 (ref 1–4.8)
LYMPHOCYTES ABSOLUTE: 2.6 THOU/MM3 (ref 1–4.8)
LYMPHOCYTES NFR BLD AUTO: 11.9 %
LYMPHOCYTES NFR BLD AUTO: 12.3 %
MAGNESIUM SERPL-MCNC: 2 MG/DL (ref 1.6–2.4)
MCH RBC QN AUTO: 29.2 PG (ref 26–33)
MCH RBC QN AUTO: 33.5 PG (ref 26–33)
MCHC RBC AUTO-ENTMCNC: 31.7 GM/DL (ref 32.2–35.5)
MCHC RBC AUTO-ENTMCNC: 32.3 GM/DL (ref 32.2–35.5)
MCV RBC AUTO: 105.8 FL (ref 80–94)
MCV RBC AUTO: 90.5 FL (ref 80–94)
MONOCYTES ABSOLUTE: 1.8 THOU/MM3 (ref 0.4–1.3)
MONOCYTES ABSOLUTE: 2.7 THOU/MM3 (ref 0.4–1.3)
MONOCYTES NFR BLD AUTO: 11.6 %
MONOCYTES NFR BLD AUTO: 13.2 %
MRSA DNA SPEC QL NAA+PROBE: NEGATIVE
NEUTROPHILS NFR BLD AUTO: 62.1 %
NEUTROPHILS NFR BLD AUTO: 64.6 %
NITRITE UR QL STRIP.AUTO: NEGATIVE
NRBC BLD AUTO-RTO: 1 /100 WBC
NRBC BLD AUTO-RTO: 1 /100 WBC
PH UR STRIP.AUTO: 7.5 [PH] (ref 5–9)
PHOSPHATE SERPL-MCNC: 3.7 MG/DL (ref 2.4–4.7)
PLATELET # BLD AUTO: 367 THOU/MM3 (ref 130–400)
PLATELET # BLD AUTO: 438 THOU/MM3 (ref 130–400)
PMV BLD AUTO: 11.2 FL (ref 9.4–12.4)
PMV BLD AUTO: 12.2 FL (ref 9.4–12.4)
POTASSIUM SERPL-SCNC: 3.8 MEQ/L (ref 3.5–5.2)
PROT UR STRIP.AUTO-MCNC: ABNORMAL MG/DL
RBC # BLD AUTO: 1.91 MILL/MM3 (ref 4.7–6.1)
RBC # BLD AUTO: 2.43 MILL/MM3 (ref 4.7–6.1)
RBC #/AREA URNS HPF: ABNORMAL /HPF
REASON FOR REJECTION: NORMAL
REJECTED TEST: NORMAL
RENAL EPI CELLS #/AREA URNS HPF: ABNORMAL /[HPF]
SCAN OF BLOOD SMEAR: NORMAL
SEGMENTED NEUTROPHILS ABSOLUTE COUNT: 12.9 THOU/MM3 (ref 1.8–7.7)
SEGMENTED NEUTROPHILS ABSOLUTE COUNT: 9.8 THOU/MM3 (ref 1.8–7.7)
SODIUM SERPL-SCNC: 133 MEQ/L (ref 135–145)
SP GR UR REFRACT.AUTO: 1.02 (ref 1–1.03)
TROPONIN, HIGH SENSITIVITY: 7 NG/L (ref 0–12)
UROBILINOGEN UR QL STRIP.AUTO: 1 EU/DL (ref 0–1)
WBC # BLD AUTO: 15.2 THOU/MM3 (ref 4.8–10.8)
WBC # BLD AUTO: 20.8 THOU/MM3 (ref 4.8–10.8)
WBC #/AREA URNS HPF: ABNORMAL /HPF
YEAST LIKE FUNGI URNS QL MICRO: ABNORMAL

## 2023-12-03 PROCEDURE — 2500000003 HC RX 250 WO HCPCS: Performed by: PHARMACIST

## 2023-12-03 PROCEDURE — 2060000000 HC ICU INTERMEDIATE R&B

## 2023-12-03 PROCEDURE — 6370000000 HC RX 637 (ALT 250 FOR IP): Performed by: NURSE PRACTITIONER

## 2023-12-03 PROCEDURE — 87641 MR-STAPH DNA AMP PROBE: CPT

## 2023-12-03 PROCEDURE — 2580000003 HC RX 258: Performed by: SURGERY

## 2023-12-03 PROCEDURE — 6370000000 HC RX 637 (ALT 250 FOR IP): Performed by: OCCUPATIONAL THERAPIST

## 2023-12-03 PROCEDURE — 6370000000 HC RX 637 (ALT 250 FOR IP): Performed by: SURGERY

## 2023-12-03 PROCEDURE — 84100 ASSAY OF PHOSPHORUS: CPT

## 2023-12-03 PROCEDURE — 6360000002 HC RX W HCPCS: Performed by: PHARMACIST

## 2023-12-03 PROCEDURE — 2580000003 HC RX 258: Performed by: OCCUPATIONAL THERAPIST

## 2023-12-03 PROCEDURE — 80048 BASIC METABOLIC PNL TOTAL CA: CPT

## 2023-12-03 PROCEDURE — A4216 STERILE WATER/SALINE, 10 ML: HCPCS | Performed by: SURGERY

## 2023-12-03 PROCEDURE — 2580000003 HC RX 258: Performed by: PHARMACIST

## 2023-12-03 PROCEDURE — 93005 ELECTROCARDIOGRAM TRACING: CPT | Performed by: NURSE PRACTITIONER

## 2023-12-03 PROCEDURE — 6360000002 HC RX W HCPCS: Performed by: SURGERY

## 2023-12-03 PROCEDURE — 2580000003 HC RX 258: Performed by: NURSE PRACTITIONER

## 2023-12-03 PROCEDURE — 99024 POSTOP FOLLOW-UP VISIT: CPT | Performed by: SURGERY

## 2023-12-03 PROCEDURE — 84484 ASSAY OF TROPONIN QUANT: CPT

## 2023-12-03 PROCEDURE — 82330 ASSAY OF CALCIUM: CPT

## 2023-12-03 PROCEDURE — 71045 X-RAY EXAM CHEST 1 VIEW: CPT

## 2023-12-03 PROCEDURE — 87070 CULTURE OTHR SPECIMN AEROBIC: CPT

## 2023-12-03 PROCEDURE — 6360000002 HC RX W HCPCS: Performed by: NURSE PRACTITIONER

## 2023-12-03 PROCEDURE — 2500000003 HC RX 250 WO HCPCS: Performed by: SURGERY

## 2023-12-03 PROCEDURE — 83735 ASSAY OF MAGNESIUM: CPT

## 2023-12-03 PROCEDURE — 82948 REAGENT STRIP/BLOOD GLUCOSE: CPT

## 2023-12-03 PROCEDURE — 93010 ELECTROCARDIOGRAM REPORT: CPT | Performed by: INTERNAL MEDICINE

## 2023-12-03 PROCEDURE — 85025 COMPLETE CBC W/AUTO DIFF WBC: CPT

## 2023-12-03 PROCEDURE — 81001 URINALYSIS AUTO W/SCOPE: CPT

## 2023-12-03 PROCEDURE — 36415 COLL VENOUS BLD VENIPUNCTURE: CPT

## 2023-12-03 RX ORDER — CALCIUM GLUCONATE 20 MG/ML
2000 INJECTION, SOLUTION INTRAVENOUS ONCE
Status: COMPLETED | OUTPATIENT
Start: 2023-12-03 | End: 2023-12-03

## 2023-12-03 RX ORDER — 0.9 % SODIUM CHLORIDE 0.9 %
500 INTRAVENOUS SOLUTION INTRAVENOUS ONCE
Status: DISCONTINUED | OUTPATIENT
Start: 2023-12-03 | End: 2023-12-07 | Stop reason: HOSPADM

## 2023-12-03 RX ORDER — BUSPIRONE HYDROCHLORIDE 10 MG/1
10 TABLET ORAL ONCE
Status: COMPLETED | OUTPATIENT
Start: 2023-12-03 | End: 2023-12-03

## 2023-12-03 RX ADMIN — SODIUM CHLORIDE, PRESERVATIVE FREE 10 ML: 5 INJECTION INTRAVENOUS at 21:06

## 2023-12-03 RX ADMIN — OXYCODONE HYDROCHLORIDE 5 MG: 5 TABLET ORAL at 06:58

## 2023-12-03 RX ADMIN — OXYCODONE HYDROCHLORIDE 5 MG: 5 TABLET ORAL at 14:29

## 2023-12-03 RX ADMIN — ONDANSETRON 4 MG: 2 INJECTION INTRAMUSCULAR; INTRAVENOUS at 09:09

## 2023-12-03 RX ADMIN — HYDROMORPHONE HYDROCHLORIDE 0.5 MG: 1 INJECTION, SOLUTION INTRAMUSCULAR; INTRAVENOUS; SUBCUTANEOUS at 01:21

## 2023-12-03 RX ADMIN — HYDROMORPHONE HYDROCHLORIDE 0.5 MG: 1 INJECTION, SOLUTION INTRAMUSCULAR; INTRAVENOUS; SUBCUTANEOUS at 22:28

## 2023-12-03 RX ADMIN — BUSPIRONE HYDROCHLORIDE 10 MG: 10 TABLET ORAL at 03:22

## 2023-12-03 RX ADMIN — HYDROMORPHONE HYDROCHLORIDE 0.5 MG: 1 INJECTION, SOLUTION INTRAMUSCULAR; INTRAVENOUS; SUBCUTANEOUS at 09:56

## 2023-12-03 RX ADMIN — HYDROMORPHONE HYDROCHLORIDE 0.5 MG: 1 INJECTION, SOLUTION INTRAMUSCULAR; INTRAVENOUS; SUBCUTANEOUS at 05:57

## 2023-12-03 RX ADMIN — RISPERIDONE 0.5 MG: 0.25 TABLET, FILM COATED ORAL at 10:05

## 2023-12-03 RX ADMIN — FAMOTIDINE 20 MG: 20 TABLET ORAL at 21:04

## 2023-12-03 RX ADMIN — BUSPIRONE HYDROCHLORIDE 10 MG: 10 TABLET ORAL at 21:04

## 2023-12-03 RX ADMIN — ANTACID TABLETS 500 MG: 500 TABLET, CHEWABLE ORAL at 03:28

## 2023-12-03 RX ADMIN — PROPRANOLOL HYDROCHLORIDE 10 MG: 10 TABLET ORAL at 21:04

## 2023-12-03 RX ADMIN — BUSPIRONE HYDROCHLORIDE 10 MG: 10 TABLET ORAL at 10:04

## 2023-12-03 RX ADMIN — CALCIUM GLUCONATE: 98 INJECTION, SOLUTION INTRAVENOUS at 17:49

## 2023-12-03 RX ADMIN — CALCIUM GLUCONATE 2000 MG: 20 INJECTION, SOLUTION INTRAVENOUS at 12:46

## 2023-12-03 RX ADMIN — PROPRANOLOL HYDROCHLORIDE 10 MG: 10 TABLET ORAL at 10:04

## 2023-12-03 RX ADMIN — FLUDROCORTISONE ACETATE 0.2 MG: 0.1 TABLET ORAL at 10:04

## 2023-12-03 RX ADMIN — RISPERIDONE 0.5 MG: 0.25 TABLET, FILM COATED ORAL at 21:04

## 2023-12-03 RX ADMIN — HYDROMORPHONE HYDROCHLORIDE 0.5 MG: 1 INJECTION, SOLUTION INTRAMUSCULAR; INTRAVENOUS; SUBCUTANEOUS at 12:55

## 2023-12-03 RX ADMIN — METOPROLOL SUCCINATE 25 MG: 25 TABLET, EXTENDED RELEASE ORAL at 10:04

## 2023-12-03 RX ADMIN — PIPERACILLIN AND TAZOBACTAM 4500 MG: 4; .5 INJECTION, POWDER, FOR SOLUTION INTRAVENOUS at 11:12

## 2023-12-03 RX ADMIN — VENLAFAXINE HYDROCHLORIDE 150 MG: 150 CAPSULE, EXTENDED RELEASE ORAL at 21:04

## 2023-12-03 RX ADMIN — ONDANSETRON 4 MG: 2 INJECTION INTRAMUSCULAR; INTRAVENOUS at 03:22

## 2023-12-03 RX ADMIN — ACETAMINOPHEN 650 MG: 325 TABLET ORAL at 22:29

## 2023-12-03 RX ADMIN — NALOXEGOL OXALATE 12.5 MG: 12.5 TABLET, FILM COATED ORAL at 07:00

## 2023-12-03 RX ADMIN — HYDROMORPHONE HYDROCHLORIDE 0.5 MG: 1 INJECTION, SOLUTION INTRAMUSCULAR; INTRAVENOUS; SUBCUTANEOUS at 16:07

## 2023-12-03 RX ADMIN — OXYCODONE HYDROCHLORIDE 5 MG: 5 TABLET ORAL at 21:04

## 2023-12-03 RX ADMIN — SODIUM CHLORIDE, PRESERVATIVE FREE 10 ML: 5 INJECTION INTRAVENOUS at 09:10

## 2023-12-03 RX ADMIN — SODIUM CHLORIDE: 9 INJECTION, SOLUTION INTRAVENOUS at 16:12

## 2023-12-03 RX ADMIN — TRAZODONE HYDROCHLORIDE 150 MG: 50 TABLET ORAL at 21:04

## 2023-12-03 RX ADMIN — SODIUM CHLORIDE, PRESERVATIVE FREE 20 MG: 5 INJECTION INTRAVENOUS at 09:09

## 2023-12-03 RX ADMIN — MEROPENEM 1000 MG: 1 INJECTION, POWDER, FOR SOLUTION INTRAVENOUS at 16:18

## 2023-12-03 ASSESSMENT — PAIN DESCRIPTION - ORIENTATION
ORIENTATION: RIGHT;LEFT;UPPER;LOWER
ORIENTATION: RIGHT
ORIENTATION: RIGHT;LEFT;LOWER;UPPER
ORIENTATION: MID
ORIENTATION: RIGHT;LEFT;LOWER
ORIENTATION: MID
ORIENTATION: MID
ORIENTATION: RIGHT;LEFT;UPPER;LOWER
ORIENTATION: MID

## 2023-12-03 ASSESSMENT — PAIN - FUNCTIONAL ASSESSMENT
PAIN_FUNCTIONAL_ASSESSMENT: ACTIVITIES ARE NOT PREVENTED
PAIN_FUNCTIONAL_ASSESSMENT: PREVENTS OR INTERFERES WITH MANY ACTIVE NOT PASSIVE ACTIVITIES
PAIN_FUNCTIONAL_ASSESSMENT: ACTIVITIES ARE NOT PREVENTED
PAIN_FUNCTIONAL_ASSESSMENT: PREVENTS OR INTERFERES WITH MANY ACTIVE NOT PASSIVE ACTIVITIES
PAIN_FUNCTIONAL_ASSESSMENT: ACTIVITIES ARE NOT PREVENTED
PAIN_FUNCTIONAL_ASSESSMENT: PREVENTS OR INTERFERES SOME ACTIVE ACTIVITIES AND ADLS

## 2023-12-03 ASSESSMENT — PAIN SCALES - GENERAL
PAINLEVEL_OUTOF10: 8
PAINLEVEL_OUTOF10: 5
PAINLEVEL_OUTOF10: 9
PAINLEVEL_OUTOF10: 8
PAINLEVEL_OUTOF10: 8
PAINLEVEL_OUTOF10: 9
PAINLEVEL_OUTOF10: 9
PAINLEVEL_OUTOF10: 8
PAINLEVEL_OUTOF10: 7

## 2023-12-03 ASSESSMENT — PAIN DESCRIPTION - LOCATION
LOCATION: ABDOMEN

## 2023-12-03 ASSESSMENT — PAIN DESCRIPTION - FREQUENCY
FREQUENCY: CONTINUOUS

## 2023-12-03 ASSESSMENT — PAIN DESCRIPTION - DESCRIPTORS
DESCRIPTORS: ACHING;SHARP
DESCRIPTORS: ACHING;BURNING
DESCRIPTORS: SHARP;SORE;TENDER
DESCRIPTORS: SHARP;SORE;TENDER
DESCRIPTORS: SHARP;SHOOTING
DESCRIPTORS: ACHING;BURNING
DESCRIPTORS: ACHING
DESCRIPTORS: SHARP;DISCOMFORT

## 2023-12-03 ASSESSMENT — PAIN DESCRIPTION - ONSET
ONSET: ON-GOING

## 2023-12-03 ASSESSMENT — PAIN DESCRIPTION - PAIN TYPE
TYPE: SURGICAL PAIN
TYPE: SURGICAL PAIN
TYPE: ACUTE PAIN;SURGICAL PAIN
TYPE: ACUTE PAIN;SURGICAL PAIN

## 2023-12-03 NOTE — PROGRESS NOTES
This RN called patient's mother to update her on patient status and is transfering to [facility] room 12.

## 2023-12-03 NOTE — CARE COORDINATION
Two [place] police came to the patients room because the patient called the [address] [place] [address] office. Unsure why the patient called them. Two times in the night the patient would be disoriented to the date and state some Russians was in the hallway and was going to harm someone, reoriented the patient and he would then be able to tell you where he was. He asked if he should sign up for disability and that he was [place] mess. \" At 4887 patient had 400 ml green emesis and Zofran IV given along with one Tums, and patient stated he felt better, A one time 500 ml 0.9 % bolous was given.

## 2023-12-03 NOTE — PROGRESS NOTES
Pt admitted to  4K12 from 5 k by bed. Complaints: abdominal pain      IV normal saline infusing into the upper arm right, condition patent and no redness at a rate of 25 mls/ hour with about 100 mls in the bag still. IV site free of s/s of infection or infiltration. Vital signs obtained. Assessment and data collection initiated. Two nurse skin assessment performed by Jaun Lua RN and Uvaldo Essex RN. Policies and procedures for  explained. All questions answered with no further questions at this time. Fall prevention and safety brochure discussed with patient. Bed alarm on. Call light in reach. Oriented to room. [id number]   Abdominal wound dressing changed per orders.

## 2023-12-03 NOTE — PROGRESS NOTES
The deterioration index score score 54.  This RN notified charge nurse [id number] RN, the resource nurse, and messaged on call provider Maritza Cook NP.

## 2023-12-03 NOTE — FLOWSHEET NOTE
12/03/23 1624   Safe Environment   Safety Measures Call light within reach; Family at bedside;Side rails X 2;Standard Safety Measures  (Virtual Nurse Safety Round)     VN completed safety rounds. Camera accessed for safety. Patient visualized alert in bed. Family at bedside. No s/s of any distress noted. Call bell and personal items within reach. No needs at this time.

## 2023-12-03 NOTE — PROGRESS NOTES
Pharmacy Consult for TPN/PPN Monitoring & Adjustment  IV Access: central      Dosing weight: 89 kg  Current insulin regimen: low sliding scale  Diet (excluding TPN): NPO  Maintenance fluid: none    Plan:  See components of tonight's TPN bag in the table below:  Date 12/1/2023 12/2/2023 12/3/2023   Total kcal/kg 23 23 23   Total kcal 2047 2047 2047   Protein g/kg 1.7 1.7 1.7   Protein g 151.3 151.3 151.3   Protein kcal (4 kcal/g) 605.2 605.2 605.2   Lipid % 30 30 30   Lipid g 61.4 61.4 61.4   Lipid kcal (10 kcal/g) 614.1 614.1 614.1   Dextrose g 243.4 243.4 243.4   Dextrose kcal (3.4 kcal/g) 827.7 827.7 827.7   Infusion Rate (mL/hr) 115 115 115   Na Acetate (mEq) 40 40 40   Na Chloride (mEq) 260 260 260   Na Phos (mmol)  (3 mmol = 4 mEq Na) 0 0 0   Total Na (mEq) 300 300 300   K Acetate (mEq) 72 72 72   K Chloride (mEq) 0 0 0   K Phos (mmol)  (15 mmol = 22 mEq K) 15 15 15   Total K (mEq) 94 94 94   Ca Gluconate (mEq)  (1 g = 4.65 mEq) 9.3 9.3 9.3   Mag Sulfate (mEq)  (1 g = 8.12 mEq) 8.12 8.12 8.12   Multivitamin (mL) 10 10 10   Trace Elements (mL) 1 1 1     Summary of changes for tonight's TPN: none    Electrolyte Replacement:   Calcium gluconate: 2 g    Monitoring:  BMP, Mag, iCal, & Phos ordered for tomorrow with AM labs. Pharmacy will continue to follow daily. Thank you for the consult.   Mary Ann Reis, PharmD, BCPS, BCCCP  12/3/2023 10:59 AM

## 2023-12-03 NOTE — PROGRESS NOTES
[facility]  General Surgery Postoperative Progress Note      Pt Name: Jackelin Hdez  Medical Record Number: [id number]  Date of Birth 1995   Today's Date: 12/3/2023    CC: Abdominal pain    ASSESSMENT   POD # 16 status post robotic assisted sigmoid colectomy and POD #13 status post diagnostic laparoscopy with evaluation of anastomosis and POD # 8 status post exploratory laparotomy with washout and diverting colostomy  Left abdominal fluid collection - increasing since prior study   Right lower quadrant wound dehiscence  Acute postoperative pain  Nausea   Bilateral pleural effusions  Morbid obesity (BMI 40)  Generalized edema   PLAN   Repeat CT this morning per radiologist recommendations. Increasing collection to left side of abdomen. Plan on IR drain placement if possible. Cultures at that time. Right lower quadrant wound dehiscence. Remove those staples and culture wound. Wet to dry dressing. Discussed with nursing. Give lasix 20 mg IV today. Will give K+ will as he has been low in the past.  Continue TPN  Liquids as tolerated after drain placement. Ostomy functioning. Pain & nausea control   Continue antibiotics  Continue movantik. Unable to use Reglan as he is at higher risk of experimental symptoms neuroleptic malignant syndrome with his home medications. Up with assistance   DVT prophylaxis with Lovenox - held due to procedure  Wound and ostomy following  Labs reviewed. Replace electrolytes. SUBJECTIVE   Stanislav stable overnight, having worsengin pain today. Drains placed with dark purulent effluent yesterday. Ostomy without much function. Patient's mother was bedside, initially she was tearful, she was not certain if everything was going on could be taken care of at this institution. I reassured the patient's mother that this is a complication that could happen everywhere, I feel that everything is being managed appropriately by myself as well as the other surgeons.   She was

## 2023-12-03 NOTE — PROGRESS NOTES
During Bedside shift report patient was confused patient said \"can I go back to my regular hospital room? \" This RN told the patient you are in same hospital room since admission. This RN asked where do you think you are at. Patient stated, \"I am in a room surrounded by outdoor walls. \" We asked him the orientations questions and patient answered appropriately. On the patients bedside table was a vape, next to a draw string blue bag. We put the vape in the draw string blue bag and put the bag to the side. This RN called campus police and [facility] RN and they placed the 3 vapes, vape batteries, vape refill, wallet, and contacts in the bottom of the patient cabinet.

## 2023-12-04 LAB
ABO: NORMAL
ALBUMIN SERPL BCG-MCNC: 1.9 G/DL (ref 3.5–5.1)
ALP SERPL-CCNC: 70 U/L (ref 38–126)
ALT SERPL W/O P-5'-P-CCNC: 24 U/L (ref 11–66)
ANION GAP SERPL CALC-SCNC: 6 MEQ/L (ref 8–16)
ANTIBODY SCREEN: NORMAL
AST SERPL-CCNC: 26 U/L (ref 5–40)
BASOPHILS ABSOLUTE: 0.1 THOU/MM3 (ref 0–0.1)
BASOPHILS NFR BLD AUTO: 0.3 %
BILIRUB CONJ SERPL-MCNC: 0.6 MG/DL (ref 0–0.3)
BILIRUB SERPL-MCNC: 1 MG/DL (ref 0.3–1.2)
BUN SERPL-MCNC: 9 MG/DL (ref 7–22)
CA-I BLD ISE-SCNC: 1.14 MMOL/L (ref 1.12–1.32)
CALCIUM SERPL-MCNC: 7.5 MG/DL (ref 8.5–10.5)
CHLORIDE SERPL-SCNC: 99 MEQ/L (ref 98–111)
CO2 SERPL-SCNC: 26 MEQ/L (ref 23–33)
CREAT SERPL-MCNC: 0.6 MG/DL (ref 0.4–1.2)
DEPRECATED RDW RBC AUTO: 49 FL (ref 35–45)
EOSINOPHIL NFR BLD AUTO: 1 %
EOSINOPHILS ABSOLUTE: 0.2 THOU/MM3 (ref 0–0.4)
ERYTHROCYTE [DISTWIDTH] IN BLOOD BY AUTOMATED COUNT: 14.8 % (ref 11.5–14.5)
GFR SERPL CREATININE-BSD FRML MDRD: > 60 ML/MIN/1.73M2
GLUCOSE BLD STRIP.AUTO-MCNC: 112 MG/DL (ref 70–108)
GLUCOSE BLD STRIP.AUTO-MCNC: 113 MG/DL (ref 70–108)
GLUCOSE BLD STRIP.AUTO-MCNC: 137 MG/DL (ref 70–108)
GLUCOSE BLD STRIP.AUTO-MCNC: 138 MG/DL (ref 70–108)
GLUCOSE SERPL-MCNC: 122 MG/DL (ref 70–108)
HCT VFR BLD AUTO: 19.9 % (ref 42–52)
HCT VFR BLD AUTO: 21.1 % (ref 42–52)
HGB BLD-MCNC: 6.5 GM/DL (ref 14–18)
HGB BLD-MCNC: 6.6 GM/DL (ref 14–18)
IMM GRANULOCYTES # BLD AUTO: 2.47 THOU/MM3 (ref 0–0.07)
IMM GRANULOCYTES NFR BLD AUTO: 13.3 %
LYMPHOCYTES ABSOLUTE: 2.4 THOU/MM3 (ref 1–4.8)
LYMPHOCYTES NFR BLD AUTO: 12.8 %
MAGNESIUM SERPL-MCNC: 2.1 MG/DL (ref 1.6–2.4)
MCH RBC QN AUTO: 28.7 PG (ref 26–33)
MCHC RBC AUTO-ENTMCNC: 31.3 GM/DL (ref 32.2–35.5)
MCV RBC AUTO: 91.7 FL (ref 80–94)
MONOCYTES ABSOLUTE: 2.2 THOU/MM3 (ref 0.4–1.3)
MONOCYTES NFR BLD AUTO: 11.8 %
NEUTROPHILS NFR BLD AUTO: 60.8 %
NRBC BLD AUTO-RTO: 1 /100 WBC
PHOSPHATE SERPL-MCNC: 4 MG/DL (ref 2.4–4.7)
PLATELET # BLD AUTO: 432 THOU/MM3 (ref 130–400)
PMV BLD AUTO: 11.3 FL (ref 9.4–12.4)
POTASSIUM SERPL-SCNC: 3.9 MEQ/L (ref 3.5–5.2)
PROT SERPL-MCNC: 5.7 G/DL (ref 6.1–8)
RBC # BLD AUTO: 2.3 MILL/MM3 (ref 4.7–6.1)
RH FACTOR: NORMAL
SEGMENTED NEUTROPHILS ABSOLUTE COUNT: 11.3 THOU/MM3 (ref 1.8–7.7)
SODIUM SERPL-SCNC: 131 MEQ/L (ref 135–145)
WBC # BLD AUTO: 18.6 THOU/MM3 (ref 4.8–10.8)

## 2023-12-04 PROCEDURE — 86923 COMPATIBILITY TEST ELECTRIC: CPT

## 2023-12-04 PROCEDURE — 6370000000 HC RX 637 (ALT 250 FOR IP): Performed by: SURGERY

## 2023-12-04 PROCEDURE — 2580000003 HC RX 258: Performed by: NURSE PRACTITIONER

## 2023-12-04 PROCEDURE — 83735 ASSAY OF MAGNESIUM: CPT

## 2023-12-04 PROCEDURE — 6360000002 HC RX W HCPCS: Performed by: NURSE PRACTITIONER

## 2023-12-04 PROCEDURE — 0D1N0Z4 BYPASS SIGMOID COLON TO CUTANEOUS, OPEN APPROACH: ICD-10-PCS | Performed by: RADIOLOGY

## 2023-12-04 PROCEDURE — 36415 COLL VENOUS BLD VENIPUNCTURE: CPT

## 2023-12-04 PROCEDURE — 36430 TRANSFUSION BLD/BLD COMPNT: CPT

## 2023-12-04 PROCEDURE — 86901 BLOOD TYPING SEROLOGIC RH(D): CPT

## 2023-12-04 PROCEDURE — 2580000003 HC RX 258: Performed by: SURGERY

## 2023-12-04 PROCEDURE — 2580000003 HC RX 258: Performed by: PHARMACIST

## 2023-12-04 PROCEDURE — 99024 POSTOP FOLLOW-UP VISIT: CPT | Performed by: SURGERY

## 2023-12-04 PROCEDURE — 82330 ASSAY OF CALCIUM: CPT

## 2023-12-04 PROCEDURE — 85018 HEMOGLOBIN: CPT

## 2023-12-04 PROCEDURE — 6360000002 HC RX W HCPCS: Performed by: PHARMACIST

## 2023-12-04 PROCEDURE — 86850 RBC ANTIBODY SCREEN: CPT

## 2023-12-04 PROCEDURE — 85025 COMPLETE CBC W/AUTO DIFF WBC: CPT

## 2023-12-04 PROCEDURE — 82248 BILIRUBIN DIRECT: CPT

## 2023-12-04 PROCEDURE — 85014 HEMATOCRIT: CPT

## 2023-12-04 PROCEDURE — 6370000000 HC RX 637 (ALT 250 FOR IP): Performed by: NURSE PRACTITIONER

## 2023-12-04 PROCEDURE — 82948 REAGENT STRIP/BLOOD GLUCOSE: CPT

## 2023-12-04 PROCEDURE — 6360000002 HC RX W HCPCS: Performed by: SURGERY

## 2023-12-04 PROCEDURE — 86900 BLOOD TYPING SEROLOGIC ABO: CPT

## 2023-12-04 PROCEDURE — P9016 RBC LEUKOCYTES REDUCED: HCPCS

## 2023-12-04 PROCEDURE — 2500000003 HC RX 250 WO HCPCS: Performed by: PHARMACIST

## 2023-12-04 PROCEDURE — 2060000000 HC ICU INTERMEDIATE R&B

## 2023-12-04 PROCEDURE — 84100 ASSAY OF PHOSPHORUS: CPT

## 2023-12-04 PROCEDURE — 80053 COMPREHEN METABOLIC PANEL: CPT

## 2023-12-04 RX ORDER — SODIUM CHLORIDE 9 MG/ML
INJECTION, SOLUTION INTRAVENOUS PRN
Status: DISCONTINUED | OUTPATIENT
Start: 2023-12-04 | End: 2023-12-07 | Stop reason: HOSPADM

## 2023-12-04 RX ORDER — FUROSEMIDE 10 MG/ML
20 INJECTION INTRAMUSCULAR; INTRAVENOUS DAILY
Status: COMPLETED | OUTPATIENT
Start: 2023-12-04 | End: 2023-12-06

## 2023-12-04 RX ADMIN — OXYCODONE HYDROCHLORIDE 5 MG: 5 TABLET ORAL at 03:53

## 2023-12-04 RX ADMIN — BUSPIRONE HYDROCHLORIDE 10 MG: 10 TABLET ORAL at 21:23

## 2023-12-04 RX ADMIN — HYDROMORPHONE HYDROCHLORIDE 0.5 MG: 1 INJECTION, SOLUTION INTRAMUSCULAR; INTRAVENOUS; SUBCUTANEOUS at 21:18

## 2023-12-04 RX ADMIN — BUSPIRONE HYDROCHLORIDE 10 MG: 10 TABLET ORAL at 10:08

## 2023-12-04 RX ADMIN — HYDROMORPHONE HYDROCHLORIDE 0.5 MG: 1 INJECTION, SOLUTION INTRAMUSCULAR; INTRAVENOUS; SUBCUTANEOUS at 01:38

## 2023-12-04 RX ADMIN — MEROPENEM 1000 MG: 1 INJECTION, POWDER, FOR SOLUTION INTRAVENOUS at 10:27

## 2023-12-04 RX ADMIN — SODIUM CHLORIDE, PRESERVATIVE FREE 10 ML: 5 INJECTION INTRAVENOUS at 21:24

## 2023-12-04 RX ADMIN — MEROPENEM 1000 MG: 1 INJECTION, POWDER, FOR SOLUTION INTRAVENOUS at 00:19

## 2023-12-04 RX ADMIN — FUROSEMIDE 20 MG: 10 INJECTION, SOLUTION INTRAMUSCULAR; INTRAVENOUS at 10:08

## 2023-12-04 RX ADMIN — RISPERIDONE 0.5 MG: 0.25 TABLET, FILM COATED ORAL at 21:23

## 2023-12-04 RX ADMIN — HYDROMORPHONE HYDROCHLORIDE 0.5 MG: 1 INJECTION, SOLUTION INTRAMUSCULAR; INTRAVENOUS; SUBCUTANEOUS at 10:03

## 2023-12-04 RX ADMIN — RISPERIDONE 0.5 MG: 0.25 TABLET, FILM COATED ORAL at 10:08

## 2023-12-04 RX ADMIN — PROPRANOLOL HYDROCHLORIDE 10 MG: 10 TABLET ORAL at 21:24

## 2023-12-04 RX ADMIN — METOPROLOL SUCCINATE 25 MG: 25 TABLET, EXTENDED RELEASE ORAL at 10:08

## 2023-12-04 RX ADMIN — FAMOTIDINE 20 MG: 20 TABLET ORAL at 10:08

## 2023-12-04 RX ADMIN — SODIUM CHLORIDE, PRESERVATIVE FREE 10 ML: 5 INJECTION INTRAVENOUS at 10:17

## 2023-12-04 RX ADMIN — TRAZODONE HYDROCHLORIDE 150 MG: 50 TABLET ORAL at 21:34

## 2023-12-04 RX ADMIN — FLUDROCORTISONE ACETATE 0.2 MG: 0.1 TABLET ORAL at 10:08

## 2023-12-04 RX ADMIN — LORAZEPAM 0.26 MG: 2 INJECTION INTRAMUSCULAR; INTRAVENOUS at 22:51

## 2023-12-04 RX ADMIN — PROPRANOLOL HYDROCHLORIDE 10 MG: 10 TABLET ORAL at 10:08

## 2023-12-04 RX ADMIN — MEROPENEM 1000 MG: 1 INJECTION, POWDER, FOR SOLUTION INTRAVENOUS at 16:31

## 2023-12-04 RX ADMIN — HYDROMORPHONE HYDROCHLORIDE 0.5 MG: 1 INJECTION, SOLUTION INTRAMUSCULAR; INTRAVENOUS; SUBCUTANEOUS at 13:09

## 2023-12-04 RX ADMIN — VENLAFAXINE HYDROCHLORIDE 150 MG: 150 CAPSULE, EXTENDED RELEASE ORAL at 21:23

## 2023-12-04 RX ADMIN — HYDROMORPHONE HYDROCHLORIDE 0.5 MG: 1 INJECTION, SOLUTION INTRAMUSCULAR; INTRAVENOUS; SUBCUTANEOUS at 16:30

## 2023-12-04 RX ADMIN — FAMOTIDINE 20 MG: 20 TABLET ORAL at 21:28

## 2023-12-04 RX ADMIN — LORAZEPAM 0.26 MG: 2 INJECTION INTRAMUSCULAR; INTRAVENOUS at 11:25

## 2023-12-04 RX ADMIN — NALOXEGOL OXALATE 12.5 MG: 12.5 TABLET, FILM COATED ORAL at 05:55

## 2023-12-04 RX ADMIN — HYDROMORPHONE HYDROCHLORIDE 0.5 MG: 1 INJECTION, SOLUTION INTRAMUSCULAR; INTRAVENOUS; SUBCUTANEOUS at 05:50

## 2023-12-04 RX ADMIN — CALCIUM GLUCONATE: 98 INJECTION, SOLUTION INTRAVENOUS at 16:31

## 2023-12-04 ASSESSMENT — PAIN SCALES - GENERAL
PAINLEVEL_OUTOF10: 7
PAINLEVEL_OUTOF10: 9
PAINLEVEL_OUTOF10: 7
PAINLEVEL_OUTOF10: 8
PAINLEVEL_OUTOF10: 7
PAINLEVEL_OUTOF10: 4
PAINLEVEL_OUTOF10: 7
PAINLEVEL_OUTOF10: 8
PAINLEVEL_OUTOF10: 4
PAINLEVEL_OUTOF10: 6
PAINLEVEL_OUTOF10: 9
PAINLEVEL_OUTOF10: 4

## 2023-12-04 ASSESSMENT — PAIN DESCRIPTION - ORIENTATION
ORIENTATION: RIGHT;LEFT;LOWER
ORIENTATION: MID
ORIENTATION: MID;LOWER
ORIENTATION: MID;LOWER
ORIENTATION: MID
ORIENTATION: MID
ORIENTATION: RIGHT;LEFT;LOWER
ORIENTATION: RIGHT;LEFT;MID

## 2023-12-04 ASSESSMENT — PAIN DESCRIPTION - LOCATION
LOCATION: ABDOMEN

## 2023-12-04 ASSESSMENT — PAIN DESCRIPTION - ONSET
ONSET: ON-GOING

## 2023-12-04 ASSESSMENT — PAIN DESCRIPTION - PAIN TYPE
TYPE: SURGICAL PAIN
TYPE: ACUTE PAIN;SURGICAL PAIN
TYPE: ACUTE PAIN;SURGICAL PAIN
TYPE: SURGICAL PAIN

## 2023-12-04 ASSESSMENT — PAIN - FUNCTIONAL ASSESSMENT

## 2023-12-04 ASSESSMENT — PAIN DESCRIPTION - DESCRIPTORS
DESCRIPTORS: SHARP;SORE;TENDER
DESCRIPTORS: ACHING;SHARP;SHOOTING
DESCRIPTORS: SHARP;SHOOTING
DESCRIPTORS: SHARP;SHOOTING
DESCRIPTORS: SHARP
DESCRIPTORS: ACHING;STABBING
DESCRIPTORS: SHARP;SORE;TENDER
DESCRIPTORS: SHARP;SHOOTING
DESCRIPTORS: SHARP;SORE;TENDER
DESCRIPTORS: ACHING

## 2023-12-04 ASSESSMENT — PAIN DESCRIPTION - FREQUENCY
FREQUENCY: CONTINUOUS

## 2023-12-04 NOTE — PROGRESS NOTES
Comprehensive Nutrition Assessment    Type and Reason for Visit:  Reassess (TPN recommendations and management)    Nutrition Recommendations/Plan:   Continue with current TPN macronutrients: 23 kcals/kg, 1.7 g/kg protein, 30% lipid kcals with dosing weight 89 kg. Discussed with pharmacy. This regimen meeting 100% of nutritional needs. Diet per surgery. Note drainage very milky in appearance. Monitor for chylous leak. Monitoring all nutrition aspects and will provide further recommendations as necessary and appropriate. Malnutrition Assessment:  Malnutrition Status: At risk for malnutrition (Comment) (12/04/23 1124)    Context:  Acute Illness     Findings of the 6 clinical characteristics of malnutrition:  Energy Intake:   (TPN initiated 11/25, prior~ minimal intake x 7 days)  Weight Loss:  Unable to assess (unable to assess d/t fluid retention/edema)     Body Fat Loss:  Mild body fat loss Orbital   Muscle Mass Loss:  No significant muscle mass loss    Fluid Accumulation:  Mild Extremities, Generalized   Strength:  Not Performed    Nutrition Assessment:      At risk for nutrition compromise r/t altered GI function - admit with diverticulitis, 11/17/23 sigmoid colectomy, 11/21 diagnostic laparoscopy, 11/24 colonic anastomotic resection and end colostomy, NPO status (TPN initiated 11/25), PICC placed 11/29, milky drainage~monitor for chylous leak, and underlying medical condition (POTS, PTSD, HTN, vapes nicotine). Nutrition Related Findings:    Pt. Report/Treatments/Miscellaneous: pt seen at bedside this morning~ reports nausea much improved overall. Continues with significant abdominal pain. Wants something to drink.    GI Status: colostomy functioning  Pertinent Labs: Na 131, K 3.9, BUN 9, creatinine 0.6, glucose 122, phos 4, hgb 6.5  Pertinent Meds: pepcid, movantik      Wound Type:  (abdominal incision: 11/17 sigmoid colectomy, 11/17 colonic anastomotic resection and end colostomy, R lower quadrant

## 2023-12-04 NOTE — PROGRESS NOTES
Pharmacy Consult for TPN/PPN Monitoring & Adjustment  IV Access: central      Dosing weight: 89 kg  Current insulin regimen: low sliding scale  Diet (excluding TPN): NPO  Maintenance fluid: none    Plan:  See components of tonight's TPN bag in the table below:  Date 12/2/2023 12/3/2023 12/4/2023   Total kcal/kg 23 23 23   Total kcal 2047 2047 2047   Protein g/kg 1.7 1.7 1.7   Protein g 151.3 151.3 151.3   Protein kcal (4 kcal/g) 605.2 605.2 605.2   Lipid % 30 30 30   Lipid g 61.4 61.4 61.4   Lipid kcal (10 kcal/g) 614.1 614.1 614.1   Dextrose g 243.4 243.4 243.4   Dextrose kcal (3.4 kcal/g) 827.7 827.7 827.7   Infusion Rate (mL/hr) 115 115 115   Na Acetate (mEq) 40 40 40   Na Chloride (mEq) 260 260 280   Na Phos (mmol)  (3 mmol = 4 mEq Na) 0 0 0   Total Na (mEq) 300 300 320   K Acetate (mEq) 72 72 72   K Chloride (mEq) 0 0 0   K Phos (mmol)  (15 mmol = 22 mEq K) 15 15 15   Total K (mEq) 94 94 94   Ca Gluconate (mEq)  (1 g = 4.65 mEq) 9.3 9.3 9.3   Mag Sulfate (mEq)  (1 g = 8.12 mEq) 8.12 8.12 8.12   Multivitamin (mL) 10 10 10   Trace Elements (mL) 1 1 1     Summary of changes for tonight's TPN:   Increase NaCl to 280 mEq    Electrolyte Replacement:   None    Monitoring:  BMP & Phos ordered for tomorrow with AM labs. Pharmacy will continue to follow daily. Thank you for the consult.     Alie Glover, PharmD, BCPS   12/4/2023  11:22 AM

## 2023-12-04 NOTE — FLOWSHEET NOTE
12/04/23 1206   Safe Environment   Safety Measures Bed in low position;Call light within reach; Side rails X 2  (Virtual Nurse Safety Round Complete)     Call placed to patients room, patient did not respond to audio, video turned on for safety purposes. Patient is resting in bed with eyes closed, call light within reach, no signs or symtpoms of distress noted.

## 2023-12-05 ENCOUNTER — APPOINTMENT (OUTPATIENT)
Dept: CT IMAGING | Age: 28
DRG: 231 | End: 2023-12-05
Attending: SURGERY
Payer: COMMERCIAL

## 2023-12-05 LAB
ANION GAP SERPL CALC-SCNC: 9 MEQ/L (ref 8–16)
BACTERIA SPEC AEROBE CULT: NORMAL
BACTERIA SPEC AEROBE CULT: NORMAL
BACTERIA SPEC ANAEROBE CULT: NORMAL
BASOPHILS ABSOLUTE: 0.1 THOU/MM3 (ref 0–0.1)
BASOPHILS NFR BLD AUTO: 0.7 %
BUN SERPL-MCNC: 10 MG/DL (ref 7–22)
CALCIUM SERPL-MCNC: 7.9 MG/DL (ref 8.5–10.5)
CHLORIDE SERPL-SCNC: 98 MEQ/L (ref 98–111)
CO2 SERPL-SCNC: 25 MEQ/L (ref 23–33)
CREAT SERPL-MCNC: 0.6 MG/DL (ref 0.4–1.2)
DEPRECATED RDW RBC AUTO: 49 FL (ref 35–45)
EOSINOPHIL NFR BLD AUTO: 0.7 %
EOSINOPHILS ABSOLUTE: 0.1 THOU/MM3 (ref 0–0.4)
ERYTHROCYTE [DISTWIDTH] IN BLOOD BY AUTOMATED COUNT: 14.8 % (ref 11.5–14.5)
GFR SERPL CREATININE-BSD FRML MDRD: > 60 ML/MIN/1.73M2
GLUCOSE BLD STRIP.AUTO-MCNC: 134 MG/DL (ref 70–108)
GLUCOSE BLD STRIP.AUTO-MCNC: 139 MG/DL (ref 70–108)
GLUCOSE BLD STRIP.AUTO-MCNC: 142 MG/DL (ref 70–108)
GLUCOSE BLD STRIP.AUTO-MCNC: 143 MG/DL (ref 70–108)
GLUCOSE SERPL-MCNC: 116 MG/DL (ref 70–108)
GRAM STN SPEC: NORMAL
HCT VFR BLD AUTO: 26.4 % (ref 42–52)
HGB BLD-MCNC: 8.2 GM/DL (ref 14–18)
IMM GRANULOCYTES # BLD AUTO: 2.88 THOU/MM3 (ref 0–0.07)
IMM GRANULOCYTES NFR BLD AUTO: 14.5 %
LYMPHOCYTES ABSOLUTE: 2.1 THOU/MM3 (ref 1–4.8)
LYMPHOCYTES NFR BLD AUTO: 10.4 %
MCH RBC QN AUTO: 28.8 PG (ref 26–33)
MCHC RBC AUTO-ENTMCNC: 31.1 GM/DL (ref 32.2–35.5)
MCV RBC AUTO: 92.6 FL (ref 80–94)
MONOCYTES ABSOLUTE: 2.3 THOU/MM3 (ref 0.4–1.3)
MONOCYTES NFR BLD AUTO: 11.4 %
NEUTROPHILS NFR BLD AUTO: 62.3 %
NRBC BLD AUTO-RTO: 1 /100 WBC
PHOSPHATE SERPL-MCNC: 3.7 MG/DL (ref 2.4–4.7)
PLATELET # BLD AUTO: 495 THOU/MM3 (ref 130–400)
PMV BLD AUTO: 11.6 FL (ref 9.4–12.4)
POTASSIUM SERPL-SCNC: 3.8 MEQ/L (ref 3.5–5.2)
RBC # BLD AUTO: 2.85 MILL/MM3 (ref 4.7–6.1)
SEGMENTED NEUTROPHILS ABSOLUTE COUNT: 12.3 THOU/MM3 (ref 1.8–7.7)
SODIUM SERPL-SCNC: 132 MEQ/L (ref 135–145)
WBC # BLD AUTO: 19.8 THOU/MM3 (ref 4.8–10.8)

## 2023-12-05 PROCEDURE — 84100 ASSAY OF PHOSPHORUS: CPT

## 2023-12-05 PROCEDURE — 99024 POSTOP FOLLOW-UP VISIT: CPT | Performed by: SURGERY

## 2023-12-05 PROCEDURE — 36415 COLL VENOUS BLD VENIPUNCTURE: CPT

## 2023-12-05 PROCEDURE — 6360000002 HC RX W HCPCS: Performed by: SURGERY

## 2023-12-05 PROCEDURE — 80048 BASIC METABOLIC PNL TOTAL CA: CPT

## 2023-12-05 PROCEDURE — 6360000002 HC RX W HCPCS: Performed by: PHARMACIST

## 2023-12-05 PROCEDURE — 85025 COMPLETE CBC W/AUTO DIFF WBC: CPT

## 2023-12-05 PROCEDURE — 82948 REAGENT STRIP/BLOOD GLUCOSE: CPT

## 2023-12-05 PROCEDURE — 2500000003 HC RX 250 WO HCPCS: Performed by: PHARMACIST

## 2023-12-05 PROCEDURE — 6360000004 HC RX CONTRAST MEDICATION: Performed by: SURGERY

## 2023-12-05 PROCEDURE — 2580000003 HC RX 258: Performed by: PHARMACIST

## 2023-12-05 PROCEDURE — 6360000002 HC RX W HCPCS: Performed by: NURSE PRACTITIONER

## 2023-12-05 PROCEDURE — 6370000000 HC RX 637 (ALT 250 FOR IP): Performed by: SURGERY

## 2023-12-05 PROCEDURE — 74177 CT ABD & PELVIS W/CONTRAST: CPT

## 2023-12-05 PROCEDURE — 6370000000 HC RX 637 (ALT 250 FOR IP): Performed by: NURSE PRACTITIONER

## 2023-12-05 PROCEDURE — 2580000003 HC RX 258: Performed by: SURGERY

## 2023-12-05 PROCEDURE — 2060000000 HC ICU INTERMEDIATE R&B

## 2023-12-05 PROCEDURE — 2580000003 HC RX 258: Performed by: NURSE PRACTITIONER

## 2023-12-05 RX ADMIN — MEROPENEM 1000 MG: 1 INJECTION, POWDER, FOR SOLUTION INTRAVENOUS at 16:02

## 2023-12-05 RX ADMIN — HYDROMORPHONE HYDROCHLORIDE 0.5 MG: 1 INJECTION, SOLUTION INTRAMUSCULAR; INTRAVENOUS; SUBCUTANEOUS at 00:27

## 2023-12-05 RX ADMIN — HYDROMORPHONE HYDROCHLORIDE 0.5 MG: 1 INJECTION, SOLUTION INTRAMUSCULAR; INTRAVENOUS; SUBCUTANEOUS at 06:45

## 2023-12-05 RX ADMIN — MEROPENEM 1000 MG: 1 INJECTION, POWDER, FOR SOLUTION INTRAVENOUS at 03:33

## 2023-12-05 RX ADMIN — FAMOTIDINE 20 MG: 20 TABLET ORAL at 09:59

## 2023-12-05 RX ADMIN — CALCIUM GLUCONATE: 98 INJECTION, SOLUTION INTRAVENOUS at 17:45

## 2023-12-05 RX ADMIN — MEROPENEM 1000 MG: 1 INJECTION, POWDER, FOR SOLUTION INTRAVENOUS at 09:17

## 2023-12-05 RX ADMIN — BUSPIRONE HYDROCHLORIDE 10 MG: 10 TABLET ORAL at 09:59

## 2023-12-05 RX ADMIN — RISPERIDONE 0.5 MG: 0.25 TABLET, FILM COATED ORAL at 09:59

## 2023-12-05 RX ADMIN — HYDROMORPHONE HYDROCHLORIDE 0.5 MG: 1 INJECTION, SOLUTION INTRAMUSCULAR; INTRAVENOUS; SUBCUTANEOUS at 03:30

## 2023-12-05 RX ADMIN — HYDROMORPHONE HYDROCHLORIDE 0.5 MG: 1 INJECTION, SOLUTION INTRAMUSCULAR; INTRAVENOUS; SUBCUTANEOUS at 20:26

## 2023-12-05 RX ADMIN — HYDROMORPHONE HYDROCHLORIDE 0.5 MG: 1 INJECTION, SOLUTION INTRAMUSCULAR; INTRAVENOUS; SUBCUTANEOUS at 23:21

## 2023-12-05 RX ADMIN — NALOXEGOL OXALATE 12.5 MG: 12.5 TABLET, FILM COATED ORAL at 06:53

## 2023-12-05 RX ADMIN — HYDROMORPHONE HYDROCHLORIDE 0.5 MG: 1 INJECTION, SOLUTION INTRAMUSCULAR; INTRAVENOUS; SUBCUTANEOUS at 17:38

## 2023-12-05 RX ADMIN — LORAZEPAM 0.26 MG: 2 INJECTION INTRAMUSCULAR; INTRAVENOUS at 07:51

## 2023-12-05 RX ADMIN — OXYCODONE HYDROCHLORIDE 5 MG: 5 TABLET ORAL at 15:57

## 2023-12-05 RX ADMIN — METOPROLOL SUCCINATE 25 MG: 25 TABLET, EXTENDED RELEASE ORAL at 09:59

## 2023-12-05 RX ADMIN — OXYCODONE HYDROCHLORIDE 5 MG: 5 TABLET ORAL at 11:40

## 2023-12-05 RX ADMIN — FAMOTIDINE 20 MG: 20 TABLET ORAL at 20:26

## 2023-12-05 RX ADMIN — HYDROMORPHONE HYDROCHLORIDE 0.5 MG: 1 INJECTION, SOLUTION INTRAMUSCULAR; INTRAVENOUS; SUBCUTANEOUS at 13:23

## 2023-12-05 RX ADMIN — PROPRANOLOL HYDROCHLORIDE 10 MG: 10 TABLET ORAL at 20:26

## 2023-12-05 RX ADMIN — BUSPIRONE HYDROCHLORIDE 10 MG: 10 TABLET ORAL at 20:25

## 2023-12-05 RX ADMIN — MEROPENEM 1000 MG: 1 INJECTION, POWDER, FOR SOLUTION INTRAVENOUS at 23:24

## 2023-12-05 RX ADMIN — PROPRANOLOL HYDROCHLORIDE 10 MG: 10 TABLET ORAL at 09:59

## 2023-12-05 RX ADMIN — VENLAFAXINE HYDROCHLORIDE 150 MG: 150 CAPSULE, EXTENDED RELEASE ORAL at 20:26

## 2023-12-05 RX ADMIN — RISPERIDONE 0.5 MG: 0.25 TABLET, FILM COATED ORAL at 20:26

## 2023-12-05 RX ADMIN — TRAZODONE HYDROCHLORIDE 150 MG: 50 TABLET ORAL at 20:26

## 2023-12-05 RX ADMIN — IOPAMIDOL 80 ML: 755 INJECTION, SOLUTION INTRAVENOUS at 12:05

## 2023-12-05 RX ADMIN — FUROSEMIDE 20 MG: 10 INJECTION, SOLUTION INTRAMUSCULAR; INTRAVENOUS at 10:00

## 2023-12-05 RX ADMIN — HYDROMORPHONE HYDROCHLORIDE 0.5 MG: 1 INJECTION, SOLUTION INTRAMUSCULAR; INTRAVENOUS; SUBCUTANEOUS at 09:54

## 2023-12-05 RX ADMIN — SODIUM CHLORIDE, PRESERVATIVE FREE 10 ML: 5 INJECTION INTRAVENOUS at 09:54

## 2023-12-05 RX ADMIN — OXYCODONE HYDROCHLORIDE 5 MG: 5 TABLET ORAL at 22:06

## 2023-12-05 RX ADMIN — LORAZEPAM 0.26 MG: 2 INJECTION INTRAMUSCULAR; INTRAVENOUS at 18:50

## 2023-12-05 RX ADMIN — FLUDROCORTISONE ACETATE 0.2 MG: 0.1 TABLET ORAL at 10:00

## 2023-12-05 ASSESSMENT — PAIN DESCRIPTION - LOCATION
LOCATION: ABDOMEN

## 2023-12-05 ASSESSMENT — PAIN SCALES - GENERAL
PAINLEVEL_OUTOF10: 9
PAINLEVEL_OUTOF10: 8
PAINLEVEL_OUTOF10: 7
PAINLEVEL_OUTOF10: 7
PAINLEVEL_OUTOF10: 3
PAINLEVEL_OUTOF10: 7
PAINLEVEL_OUTOF10: 7
PAINLEVEL_OUTOF10: 9
PAINLEVEL_OUTOF10: 7
PAINLEVEL_OUTOF10: 8
PAINLEVEL_OUTOF10: 6
PAINLEVEL_OUTOF10: 5
PAINLEVEL_OUTOF10: 8
PAINLEVEL_OUTOF10: 4
PAINLEVEL_OUTOF10: 4

## 2023-12-05 ASSESSMENT — PAIN - FUNCTIONAL ASSESSMENT
PAIN_FUNCTIONAL_ASSESSMENT: PREVENTS OR INTERFERES SOME ACTIVE ACTIVITIES AND ADLS
PAIN_FUNCTIONAL_ASSESSMENT: PREVENTS OR INTERFERES WITH MANY ACTIVE NOT PASSIVE ACTIVITIES
PAIN_FUNCTIONAL_ASSESSMENT: PREVENTS OR INTERFERES SOME ACTIVE ACTIVITIES AND ADLS

## 2023-12-05 ASSESSMENT — PAIN DESCRIPTION - DESCRIPTORS
DESCRIPTORS: SHOOTING;SHARP
DESCRIPTORS: ACHING;DISCOMFORT;CRAMPING
DESCRIPTORS: ACHING;DULL;SORE;TENDER
DESCRIPTORS: ACHING;SORE;THROBBING
DESCRIPTORS: SHARP;SHOOTING
DESCRIPTORS: SHOOTING;SHARP
DESCRIPTORS: ACHING;DISCOMFORT;SORE

## 2023-12-05 ASSESSMENT — PAIN DESCRIPTION - ORIENTATION
ORIENTATION: MID;LEFT;RIGHT
ORIENTATION: RIGHT;LEFT;MID
ORIENTATION: MID;LOWER
ORIENTATION: LOWER;MID
ORIENTATION: MID;INNER;RIGHT;LEFT
ORIENTATION: MID
ORIENTATION: RIGHT;LEFT

## 2023-12-05 ASSESSMENT — PAIN DESCRIPTION - PAIN TYPE
TYPE: SURGICAL PAIN
TYPE: SURGICAL PAIN

## 2023-12-05 ASSESSMENT — PAIN DESCRIPTION - FREQUENCY
FREQUENCY: CONTINUOUS
FREQUENCY: CONTINUOUS

## 2023-12-05 ASSESSMENT — PAIN DESCRIPTION - ONSET
ONSET: ON-GOING
ONSET: ON-GOING

## 2023-12-05 NOTE — CARE COORDINATION
12/5/23, 1:48 PM EST    DISCHARGE ON GOING EVALUATION    [facility] day: 18  Location: -12/012-A Reason for admit: Diverticulitis [K57.92]  Acute diverticulitis [K57.92]   Procedure:   11/17 OR with Dr. Adrianne Lundy: Robotic Sigmoid Colectomy   11/20 NG placed  11/20 OR with Dr. Adrianne Lundy: Diagnostic laparoscopy. 11/24 CT A/P: IMPRESSION: Suspect SBO with transition point in the mid central small bowel loops. Numerous scattered pockets of free intraperitoneal air are seen both near the surgical anastomosis and throughout the abdomen which may indicate anastomotic leak. A small pocket of semi organized fluid in the dependent portion of the pelvis mildly enhances peripherally and contains foci of air density which could represent a developing abscess. Trace bilateral pleural effusions and patchy bilateral lower lobe airspace opacities may indicate pneumonia in the appropriate clinical setting. Follow-up to ensure resolution is advised. Diffuse total body wall edema suggesting anasarca is present. Nonspecific bladder wall thickening may be artifactual related to under distention. Correlate with urinalysis. 11/25 OR with Dr. Jenelle Sin: Exploratory laparotomy with colonic anastomotic resection, washout, drain placement x2 and end-colostomy. 11/29 PICC insertion   12/1 NG removed  12/2 IR with Dr. Senthil Pearl: Alfonso Cleveland guided drain placement to right and left sides of abdomen. 12/5 CT A/P: The patient has undergone drainage of the fluid collections in the right and left sides of the abdomen. 2. There is barium within the fluid in the collection along the left lateral abdominal wall. This is suspicious for leakage of barium within bowel loops into the collection and underlying perforation. 3. Drainage catheter in the right flank. The previously noted right fluid collection has resolved. 4. Dilated small bowel loops in the left lower quadrant. 4. Postoperative changes.   5. Diminution in the free fluid in the pelvis since previous study dated 12/2/2023. 6. Bilateral pleural effusions. Areas of atelectasis or infiltrate at both lung bases. Barriers to Discharge: General Surgery following. Dietitian. Advanced to Full Liquids. Hgb 8.2 after receiving 1 unit of PRBC for hgb 6.5. TPN. IV lasix daily. IV dilaudid prn. IV ativan prn. IV merrem q8h. Roxicodone prn. Bowel regimen. LUQ colostomy with output. LUQ NELY (3 ml out) LLQ Accordion (150ml out), RLQ Accordion (0 ml out). Penrose drain UZAIR. WBC 19.8 (18.6)     PCP: [facility]  Readmission Risk Score: 22.8%  Patient Goals/Plan/Treatment Preferences: Home with parents. Potential need for HH. Continue to monitor pending medical course.

## 2023-12-05 NOTE — PROGRESS NOTES
[facility] Wound Ostomy Continence Nurse  Progress Note       Robert Dee  AGE: 33 y.o. GENDER: male  : 1995  UNIT: 4K-12/012-A  TODAY'S DATE:  2023  ADMISSION DATE: 2023  7:09 AM  Subjective:     Reason for [facility] Evaluation and Assessment: new colostomy pouching system change      Robert Dee is a 33 y.o. male referred by:   [x] Physician/PA/APRN  [] Nursing  [] Other:     Objective:     Tito Risk Score: Tito Scale Score: 17    Assessment:     Encounter: Present to pt room for new colostomy consult. Status post exploratory laparotomy with colonic anastomotic resection and end colostomy, per Dr. Sonia Tam. Patient lying in bed upon arrival. Removed old pouching system to left upper quadrant. Cleansed stoma with warm wash cloth, pat dry. Stoma red, moist, and slightly protrudes. Peristomal skin pink and intact. Mucocutaneous junction separation at  o'clock, sutures in place. Stoma measured 55-70mm (including separation). See photos below. Large amount of purulent drainage from 3 oclock mucocutaneous junction separation when abdomen palpated. Coloplast 2 piece yellow flange cut to fit to size, protective ring applied to inner edge of flange, centered over stoma. Pouch applied. Applied hands lightly over system to activate hydrocolloid. Patient asleep through change. Patient has dehisced incision to right lower abdomen, staples remain at wound edge. Cleansed wound with normal saline and gauze. Pat dry with clean gauze. Lightly filled wound bed with NS moistened gauze, covered with ABD pad (including penrose drain), secured with tape. Staff to change each shift and PRN for drainage. Will plan to change pouching system and educate early next week. Will continue to follow patient. Bed in low, call light in reach. Pt had CT of abdomen today-pending results for plan. Sent larger pouching systems to patient via tube system.      23    Stoma with  large mucocuteanous junction separation all the way around stoma. Large amount of purulent drainage from 3 oclock separation area (see photo below). 11/30/23    Ostomy: Mucocutaneous junction separation- probes at 3 o'clock of 4cm      Wound type: Dehisced surgical incision, right lower abdomen  Wound size: 1cm x 4.3cm x 5cm  Undermining or Tunneling: None  Wound assessment/color: Yellow  Drainage amount: Large  Drainage description: Seropurulent  Odor: Mild  Margins: Attached  Martha wound: Edema, intact  Exposed structure: DARLEEN    11/27/23    Ostomy type: colostomy  Ostomy location: left upper quadrant  Pouching system removed: post op pouch  Stoma color: red  Stoma size: 40-50mm oval  Stoma description: red,slough/dark to outer edge  Martha stomal skin: intact  Mucocutaneous junction: intact with dark/slough area  Stool color: light brown  Stool consistency: liquid  Stool amount: scant      Seropurulent drainage from 3 oclock mucocutaneous junction area    Plan:     Treatment Recommendations:   Pouching system changes twice weekly. Referral for ostomy clinic placed. Discharge orders in place. Specialty Bed Required :   [x] Low Air Loss   [x] Pressure Redistribution  [] Fluid Immersion- Dolphin  [] Bariatric  [] RotoProne   [] Other:     Discharge Plan:     Recommend patient follow up in outpatient wound clinic with [facility].    [place] [address] [facility], [address] [place] [address],[address]  Phone: [phone]  Fax: [phone]     [x] Follow-up referral placed   [x] Follow-up appointment entered  [x] Discharge instructions in AVS  [] New patient folder/education given  [] Home Health care or facility care established  [] Discharge supply pack given

## 2023-12-05 NOTE — PROGRESS NOTES
[facility] Surgery Postoperative Progress Note      Pt Name: Naty Spann  Medical Record Number: [id number]  Date of Birth 1995   Today's Date: 12/5/2023    CC: Abdominal pain    ASSESSMENT   POD # 18 status post robotic assisted sigmoid colectomy and POD #15 status post diagnostic laparoscopy with evaluation of anastomosis and POD # 10 status post exploratory laparotomy with washout and diverting colostomy postop day #3 status post IR drain placement for abscess drainage  Hg repeat low yesterday 6.5, transfused 1 u pRBC, no source or signs of active bleeding, posttransfusion hemoglobin not drawn yesterday recheck this am pending  Sadaf liquids yesterday, no nausea or emesis, he wants to try more  On repeat evaluation at about 845 there was a large volumes in his drain in the left sided IR drain which was purulent up till sometime since yesterday there is now green appearing fluid suspicious for enteric contents. Clinically he is unchanged tolerating liquids his ostomy is working right in front of me. Going to plan CT scan abdomen pelvis with IV and oral contrast to evaluate for source to decide whether this is controlled or uncontrolled. If it is controlled with the drain coming from small intestine we would likely try and manage conservatively with drainage and n.p.o. and TPN to try and get this to close. Right lower quadrant wound dehiscence  Acute postoperative pain  Colostomy is functioning  Bilateral pleural effusions  Morbid obesity (BMI 40)  Generalized edema   PLAN   CT scan abdomen pelvis with IV and oral contrast to evaluate for source of enteric contents in the left flank drain placed by IR on Saturday  Give lasix 20 mg IV today and for 3 days . Will give K+ will as he has been low in the past.  Continue TPN  Ostomy functioning. Pain & nausea control   Continue antibiotics  Continue movantik.  Unable to use Reglan as he is at higher risk of extrapyramidal  symptoms green on top which is new and looks suspicious for enteric contents. NEUROLOGIC: There are no focalizing motor or sensory deficits. EXTREMITIES: no cyanosis, no clubbing. Edema to abdomen/flank. No lower extremity edema. LABS     Recent Labs     12/03/23  0630 12/03/23  0839 12/04/23  0400 12/04/23  1000   WBC 15.2* 20.8* 18.6*  --    HGB 6.4* 7.1* 6.6* 6.5*   HCT 20.2* 22.0* 21.1* 19.9*    438* 432*  --    NA  --  133* 131*  --    K  --  3.8 3.9  --    CL  --  99 99  --    CO2  --  28 26  --    BUN  --  11 9  --    CREATININE  --  0.6 0.6  --    MG  --  2.0 2.1  --    PHOS  --  3.7 4.0  --    CALCIUM  --  7.6* 7.5*  --       No results for input(s): \"PTT\", \"INR\" in the last 72 hours. Invalid input(s): \"PT\"  Recent Labs     12/04/23  0400   AST 26   ALT 24   BILITOT 1.0   BILIDIR 0.6*     No results for input(s): \"TROPONINT\" in the last 72 hours. RADIOLOGY     XR CHEST PORTABLE   Final Result   1. Ill-defined infiltrate at the right lung base. 2. Right-sided PICC line in place. 3. Borderline cardiomegaly. **This report has been created using voice recognition software. It may contain minor errors which are inherent in voice recognition technology. **      Final report electronically signed by DR Troy Sarah on 12/3/2023 11:15 AM      CT GUIDED NEEDLE PLACEMENT   Final Result   Successful, uncomplicated left and right abdominal drain placement using CT guidance. .               **This report has been created using voice recognition software. It may contain minor errors which are inherent in voice recognition technology. **         Final report electronically signed by Dr Preet Almaguer on 12/2/2023 8:05 PM      CT ABSCESS DRAINAGE W CATH PLACEMENT S&I   Final Result   Successful, uncomplicated left and right abdominal drain placement using CT guidance. .               **This report has been created using voice recognition software.   It may contain minor errors which are inherent in voice

## 2023-12-05 NOTE — PROGRESS NOTES
Pharmacy Consult for TPN/PPN Monitoring & Adjustment  IV Access: central      Dosing weight: 89 kg  Current insulin regimen: low sliding scale  Diet (excluding TPN): Full liquid diet  Maintenance fluid: none    Plan:  See components of tonight's TPN bag in the table below:  Date 12/3/2023 12/4/2023 12/5/2023   Total kcal/kg 23 23 23   Total kcal 2047 2047 2047   Protein g/kg 1.7 1.7 1.7   Protein g 151.3 151.3 151.3   Protein kcal (4 kcal/g) 605.2 605.2 605.2   Lipid % 30 30 30   Lipid g 61.4 61.4 61.4   Lipid kcal (10 kcal/g) 614.1 614.1 614.1   Dextrose g 243.4 243.4 243.4   Dextrose kcal (3.4 kcal/g) 827.7 827.7 827.7   Infusion Rate (mL/hr) 115 115 115   Na Acetate (mEq) 40 40 40   Na Chloride (mEq) 260 280 280   Na Phos (mmol)  (3 mmol = 4 mEq Na) 0 0 0   Total Na (mEq) 300 320 320   K Acetate (mEq) 72 72 72   K Chloride (mEq) 0 0 0   K Phos (mmol)  (15 mmol = 22 mEq K) 15 15 15   Total K (mEq) 94 94 94   Ca Gluconate (mEq)  (1 g = 4.65 mEq) 9.3 9.3 9.3   Mag Sulfate (mEq)  (1 g = 8.12 mEq) 8.12 8.12 8.12   Multivitamin (mL) 10 10 10   Trace Elements (mL) 1 1 1      Summary of changes for tonight's TPN:   None    Electrolyte Replacement:   None    Monitoring:  BMP, Mag, iCal, & Phos ordered for tomorrow with AM labs. Pharmacy will continue to follow daily. Thank you for the consult.   Elisha Davenport, PharmD, BCPS   12/5/2023  12:16 PM

## 2023-12-05 NOTE — FLOWSHEET NOTE
12/05/23 1307   Safe Environment   Safety Measures Call light within reach; Side rails X 2;Standard Safety Measures  (virtual nurse safety round complete)     VN called into patients room and introduced myself and role. Patient answered and permitted video. Video activated. Pt resting in bed awake and alert. No signs of distress noted. Patient voiced that he is in pain, let pt know when next PRN medication due. Pt utilized call light to ask RN for pain meds. VN educated pt on utilizing call light if condition changes, verbalized understanding.

## 2023-12-05 NOTE — FLOWSHEET NOTE
12/05/23 1032   Safe Environment   Safety Measures Side rails X 3;Standard Safety Measures  (virtual nurse safety round complete)     VN called into patients room and introduced myself and role. Pt did not respond to voice, camera turned on for safety. Pt resting in bed. Chest visibly rising and falling. No apparent signs of distress.  Pt cannot hear this VN despite volume on high

## 2023-12-05 NOTE — PROGRESS NOTES
Comprehensive Nutrition Assessment    Type and Reason for Visit:  Reassess    Nutrition Recommendations/Plan:     When next 3 in 1 TPN bag is made, suggest continue (dosing wt: 89 kgm, 23 kcals/kgm, 1.7 gm protein/kgm and 30% kcals from lipids)  Diet per surgery. Monitor for chylous leak noted. Allergy to pineapple & shellfish noted. Suggest weigh pt. Monitor TPN, po, labs, & wt. Malnutrition Assessment:  Malnutrition Status: At risk for malnutrition (Comment) (12/04/23 1124)    Context:  Acute Illness     Findings of the 6 clinical characteristics of malnutrition:  Energy Intake:   (TPN initiated 11/25, prior~ minimal intake x 7 days))  Weight Loss:  Unable to assess (unable to assess d/t fluid retention/edema)     Body Fat Loss:  Mild body fat loss Orbital   Muscle Mass Loss:  No significant muscle mass loss    Fluid Accumulation:  Mild Extremities, Generalized   Strength:  Not Performed    Nutrition Assessment:     Pt improving from a nutritional standpoint AEB TPN meeting estimated nutritional needs. At risk for nutrition compromise r/t altered GI function - admit with diverticulitis, 11/17/23 sigmoid colectomy, 11/21 diagnostic laparoscopy, 11/24 colonic anastomotic resection and end colostomy, NPO status (TPN initiated 11/25), PICC placed 11/29, milky drainage~monitor for chylous leak, and underlying medical condition (POTS, PTSD, HTN, vapes nicotine). Nutrition Related Findings:    Pt. Report/Treatments/Miscellaneous: Pt seen, appetite  \" I could take it or leave it\". Ate no breakfast, mentions  not fond of full liquid breakfast.  3 in 1 TPN infusing 115ml/hr.  Pt denies N/V.   GI Status: 400ml colostomy op  Pertinent Labs: no labs today ( note computer sytem was down 12/4)  Pertinent Meds: Lasix , Humalog, Movantik, Effexor     Wound Type:  (abdominal incision: 11/17 sigmoid colectomy, 11/17 colonic anastomotic resection and end colostomy, R lower quadrant  wound dehiscence)       Current

## 2023-12-05 NOTE — PROGRESS NOTES
Oral contrast given to pt. Instructed to drink 4 glasses every 20 mins before CT scan.  Pt voiced understanding

## 2023-12-06 VITALS
RESPIRATION RATE: 18 BRPM | HEIGHT: 71 IN | SYSTOLIC BLOOD PRESSURE: 130 MMHG | BODY MASS INDEX: 40.5 KG/M2 | OXYGEN SATURATION: 96 % | HEART RATE: 95 BPM | WEIGHT: 289.3 LBS | TEMPERATURE: 98.3 F | DIASTOLIC BLOOD PRESSURE: 70 MMHG

## 2023-12-06 LAB
ALBUMIN SERPL BCG-MCNC: 1.9 G/DL (ref 3.5–5.1)
ALP SERPL-CCNC: 83 U/L (ref 38–126)
ALT SERPL W/O P-5'-P-CCNC: 31 U/L (ref 11–66)
ANION GAP SERPL CALC-SCNC: 7 MEQ/L (ref 8–16)
ANISOCYTOSIS BLD QL SMEAR: PRESENT
AST SERPL-CCNC: 26 U/L (ref 5–40)
BACTERIA SPEC AEROBE CULT: ABNORMAL
BACTERIA SPEC ANAEROBE CULT: ABNORMAL
BACTERIA SPEC ANAEROBE CULT: ABNORMAL
BASOPHILS ABSOLUTE: 0.1 THOU/MM3 (ref 0–0.1)
BASOPHILS NFR BLD AUTO: 0.6 %
BILIRUB CONJ SERPL-MCNC: 0.5 MG/DL (ref 0–0.3)
BILIRUB SERPL-MCNC: 0.9 MG/DL (ref 0.3–1.2)
BUN SERPL-MCNC: 11 MG/DL (ref 7–22)
CA-I BLD ISE-SCNC: 1.15 MMOL/L (ref 1.12–1.32)
CALCIUM SERPL-MCNC: 7.9 MG/DL (ref 8.5–10.5)
CHLORIDE SERPL-SCNC: 99 MEQ/L (ref 98–111)
CO2 SERPL-SCNC: 26 MEQ/L (ref 23–33)
CREAT SERPL-MCNC: 0.5 MG/DL (ref 0.4–1.2)
DEPRECATED RDW RBC AUTO: 47.4 FL (ref 35–45)
EKG ATRIAL RATE: 100 BPM
EKG P AXIS: 24 DEGREES
EKG P-R INTERVAL: 120 MS
EKG Q-T INTERVAL: 362 MS
EKG QRS DURATION: 90 MS
EKG QTC CALCULATION (BAZETT): 466 MS
EKG R AXIS: 8 DEGREES
EKG T AXIS: -7 DEGREES
EKG VENTRICULAR RATE: 100 BPM
EOSINOPHIL NFR BLD AUTO: 0.6 %
EOSINOPHILS ABSOLUTE: 0.1 THOU/MM3 (ref 0–0.4)
ERYTHROCYTE [DISTWIDTH] IN BLOOD BY AUTOMATED COUNT: 14.6 % (ref 11.5–14.5)
GFR SERPL CREATININE-BSD FRML MDRD: > 60 ML/MIN/1.73M2
GLUCOSE BLD STRIP.AUTO-MCNC: 127 MG/DL (ref 70–108)
GLUCOSE BLD STRIP.AUTO-MCNC: 137 MG/DL (ref 70–108)
GLUCOSE BLD STRIP.AUTO-MCNC: 145 MG/DL (ref 70–108)
GLUCOSE SERPL-MCNC: 123 MG/DL (ref 70–108)
GRAM STN SPEC: ABNORMAL
GRAM STN SPEC: ABNORMAL
HCT VFR BLD AUTO: 25.3 % (ref 42–52)
HGB BLD-MCNC: 8.1 GM/DL (ref 14–18)
HYPOCHROMIA BLD QL SMEAR: PRESENT
IMM GRANULOCYTES # BLD AUTO: 2.17 THOU/MM3 (ref 0–0.07)
IMM GRANULOCYTES NFR BLD AUTO: 12.2 %
LYMPHOCYTES ABSOLUTE: 1.8 THOU/MM3 (ref 1–4.8)
LYMPHOCYTES NFR BLD AUTO: 9.9 %
MAGNESIUM SERPL-MCNC: 2.1 MG/DL (ref 1.6–2.4)
MCH RBC QN AUTO: 29 PG (ref 26–33)
MCHC RBC AUTO-ENTMCNC: 32 GM/DL (ref 32.2–35.5)
MCV RBC AUTO: 90.7 FL (ref 80–94)
MONOCYTES ABSOLUTE: 2 THOU/MM3 (ref 0.4–1.3)
MONOCYTES NFR BLD AUTO: 11.2 %
NEUTROPHILS NFR BLD AUTO: 65.5 %
NRBC BLD AUTO-RTO: 0 /100 WBC
ORGANISM: ABNORMAL
PHOSPHATE SERPL-MCNC: 4 MG/DL (ref 2.4–4.7)
PLATELET # BLD AUTO: 420 THOU/MM3 (ref 130–400)
PLATELET BLD QL SMEAR: ABNORMAL
PMV BLD AUTO: 11.2 FL (ref 9.4–12.4)
POLYCHROMASIA BLD QL SMEAR: ABNORMAL
POTASSIUM SERPL-SCNC: 4 MEQ/L (ref 3.5–5.2)
PROT SERPL-MCNC: 6.2 G/DL (ref 6.1–8)
RBC # BLD AUTO: 2.79 MILL/MM3 (ref 4.7–6.1)
SCAN OF BLOOD SMEAR: NORMAL
SEGMENTED NEUTROPHILS ABSOLUTE COUNT: 11.7 THOU/MM3 (ref 1.8–7.7)
SODIUM SERPL-SCNC: 132 MEQ/L (ref 135–145)
VARIANT LYMPHS BLD QL SMEAR: ABNORMAL %
WBC # BLD AUTO: 17.8 THOU/MM3 (ref 4.8–10.8)

## 2023-12-06 PROCEDURE — 83735 ASSAY OF MAGNESIUM: CPT

## 2023-12-06 PROCEDURE — 6370000000 HC RX 637 (ALT 250 FOR IP): Performed by: SURGERY

## 2023-12-06 PROCEDURE — 2580000003 HC RX 258: Performed by: PHARMACIST

## 2023-12-06 PROCEDURE — 82248 BILIRUBIN DIRECT: CPT

## 2023-12-06 PROCEDURE — 85025 COMPLETE CBC W/AUTO DIFF WBC: CPT

## 2023-12-06 PROCEDURE — 6360000002 HC RX W HCPCS: Performed by: SURGERY

## 2023-12-06 PROCEDURE — 84100 ASSAY OF PHOSPHORUS: CPT

## 2023-12-06 PROCEDURE — 2060000000 HC ICU INTERMEDIATE R&B

## 2023-12-06 PROCEDURE — 2580000003 HC RX 258: Performed by: SURGERY

## 2023-12-06 PROCEDURE — 99024 POSTOP FOLLOW-UP VISIT: CPT | Performed by: SURGERY

## 2023-12-06 PROCEDURE — 6360000002 HC RX W HCPCS: Performed by: NURSE PRACTITIONER

## 2023-12-06 PROCEDURE — 80053 COMPREHEN METABOLIC PANEL: CPT

## 2023-12-06 PROCEDURE — 82330 ASSAY OF CALCIUM: CPT

## 2023-12-06 PROCEDURE — 2580000003 HC RX 258: Performed by: NURSE PRACTITIONER

## 2023-12-06 PROCEDURE — 36415 COLL VENOUS BLD VENIPUNCTURE: CPT

## 2023-12-06 PROCEDURE — A4216 STERILE WATER/SALINE, 10 ML: HCPCS | Performed by: SURGERY

## 2023-12-06 PROCEDURE — 6360000002 HC RX W HCPCS: Performed by: PHARMACIST

## 2023-12-06 PROCEDURE — 82948 REAGENT STRIP/BLOOD GLUCOSE: CPT

## 2023-12-06 PROCEDURE — 6370000000 HC RX 637 (ALT 250 FOR IP): Performed by: NURSE PRACTITIONER

## 2023-12-06 PROCEDURE — 2500000003 HC RX 250 WO HCPCS: Performed by: PHARMACIST

## 2023-12-06 PROCEDURE — 2500000003 HC RX 250 WO HCPCS: Performed by: SURGERY

## 2023-12-06 RX ORDER — ENOXAPARIN SODIUM 100 MG/ML
30 INJECTION SUBCUTANEOUS EVERY 12 HOURS
Status: DISCONTINUED | OUTPATIENT
Start: 2023-12-06 | End: 2023-12-07 | Stop reason: HOSPADM

## 2023-12-06 RX ADMIN — HYDROMORPHONE HYDROCHLORIDE 0.5 MG: 1 INJECTION, SOLUTION INTRAMUSCULAR; INTRAVENOUS; SUBCUTANEOUS at 16:47

## 2023-12-06 RX ADMIN — Medication 6 MG: at 20:20

## 2023-12-06 RX ADMIN — OXYCODONE HYDROCHLORIDE 5 MG: 5 TABLET ORAL at 19:43

## 2023-12-06 RX ADMIN — BUSPIRONE HYDROCHLORIDE 10 MG: 10 TABLET ORAL at 09:40

## 2023-12-06 RX ADMIN — VENLAFAXINE HYDROCHLORIDE 150 MG: 150 CAPSULE, EXTENDED RELEASE ORAL at 20:20

## 2023-12-06 RX ADMIN — HYDROMORPHONE HYDROCHLORIDE 0.5 MG: 1 INJECTION, SOLUTION INTRAMUSCULAR; INTRAVENOUS; SUBCUTANEOUS at 06:20

## 2023-12-06 RX ADMIN — LORAZEPAM 0.26 MG: 2 INJECTION INTRAMUSCULAR; INTRAVENOUS at 18:38

## 2023-12-06 RX ADMIN — OXYCODONE HYDROCHLORIDE 5 MG: 5 TABLET ORAL at 05:05

## 2023-12-06 RX ADMIN — RISPERIDONE 0.5 MG: 0.25 TABLET, FILM COATED ORAL at 20:20

## 2023-12-06 RX ADMIN — HYDROMORPHONE HYDROCHLORIDE 0.5 MG: 1 INJECTION, SOLUTION INTRAMUSCULAR; INTRAVENOUS; SUBCUTANEOUS at 03:07

## 2023-12-06 RX ADMIN — SODIUM CHLORIDE, PRESERVATIVE FREE 10 ML: 5 INJECTION INTRAVENOUS at 09:38

## 2023-12-06 RX ADMIN — LORAZEPAM 0.26 MG: 2 INJECTION INTRAMUSCULAR; INTRAVENOUS at 07:54

## 2023-12-06 RX ADMIN — MEROPENEM 1000 MG: 1 INJECTION, POWDER, FOR SOLUTION INTRAVENOUS at 17:06

## 2023-12-06 RX ADMIN — HYDROMORPHONE HYDROCHLORIDE 0.5 MG: 1 INJECTION, SOLUTION INTRAMUSCULAR; INTRAVENOUS; SUBCUTANEOUS at 13:46

## 2023-12-06 RX ADMIN — PROPRANOLOL HYDROCHLORIDE 10 MG: 10 TABLET ORAL at 09:40

## 2023-12-06 RX ADMIN — OXYCODONE HYDROCHLORIDE 5 MG: 5 TABLET ORAL at 11:35

## 2023-12-06 RX ADMIN — FAMOTIDINE 20 MG: 20 TABLET ORAL at 20:20

## 2023-12-06 RX ADMIN — CALCIUM GLUCONATE: 98 INJECTION, SOLUTION INTRAVENOUS at 18:13

## 2023-12-06 RX ADMIN — MEROPENEM 1000 MG: 1 INJECTION, POWDER, FOR SOLUTION INTRAVENOUS at 09:37

## 2023-12-06 RX ADMIN — METOPROLOL SUCCINATE 25 MG: 25 TABLET, EXTENDED RELEASE ORAL at 09:40

## 2023-12-06 RX ADMIN — ONDANSETRON 4 MG: 2 INJECTION INTRAMUSCULAR; INTRAVENOUS at 09:38

## 2023-12-06 RX ADMIN — HYDROMORPHONE HYDROCHLORIDE 0.5 MG: 1 INJECTION, SOLUTION INTRAMUSCULAR; INTRAVENOUS; SUBCUTANEOUS at 09:38

## 2023-12-06 RX ADMIN — HYDROMORPHONE HYDROCHLORIDE 0.5 MG: 1 INJECTION, SOLUTION INTRAMUSCULAR; INTRAVENOUS; SUBCUTANEOUS at 20:20

## 2023-12-06 RX ADMIN — MEROPENEM 1000 MG: 1 INJECTION, POWDER, FOR SOLUTION INTRAVENOUS at 23:54

## 2023-12-06 RX ADMIN — FLUDROCORTISONE ACETATE 0.2 MG: 0.1 TABLET ORAL at 09:40

## 2023-12-06 RX ADMIN — SODIUM CHLORIDE, PRESERVATIVE FREE 10 ML: 5 INJECTION INTRAVENOUS at 20:23

## 2023-12-06 RX ADMIN — FUROSEMIDE 20 MG: 10 INJECTION, SOLUTION INTRAMUSCULAR; INTRAVENOUS at 09:38

## 2023-12-06 RX ADMIN — PROPRANOLOL HYDROCHLORIDE 10 MG: 10 TABLET ORAL at 20:20

## 2023-12-06 RX ADMIN — NALOXEGOL OXALATE 12.5 MG: 12.5 TABLET, FILM COATED ORAL at 05:05

## 2023-12-06 RX ADMIN — SODIUM CHLORIDE, PRESERVATIVE FREE 20 MG: 5 INJECTION INTRAVENOUS at 09:39

## 2023-12-06 RX ADMIN — HYDROMORPHONE HYDROCHLORIDE 0.5 MG: 1 INJECTION, SOLUTION INTRAMUSCULAR; INTRAVENOUS; SUBCUTANEOUS at 23:32

## 2023-12-06 RX ADMIN — TRAZODONE HYDROCHLORIDE 150 MG: 50 TABLET ORAL at 20:20

## 2023-12-06 RX ADMIN — RISPERIDONE 0.5 MG: 0.25 TABLET, FILM COATED ORAL at 09:40

## 2023-12-06 RX ADMIN — BUSPIRONE HYDROCHLORIDE 10 MG: 10 TABLET ORAL at 20:20

## 2023-12-06 ASSESSMENT — PAIN DESCRIPTION - ORIENTATION
ORIENTATION: MID

## 2023-12-06 ASSESSMENT — PAIN SCALES - GENERAL
PAINLEVEL_OUTOF10: 7
PAINLEVEL_OUTOF10: 5
PAINLEVEL_OUTOF10: 3
PAINLEVEL_OUTOF10: 4
PAINLEVEL_OUTOF10: 8
PAINLEVEL_OUTOF10: 6
PAINLEVEL_OUTOF10: 3
PAINLEVEL_OUTOF10: 3
PAINLEVEL_OUTOF10: 7
PAINLEVEL_OUTOF10: 8
PAINLEVEL_OUTOF10: 6
PAINLEVEL_OUTOF10: 4
PAINLEVEL_OUTOF10: 6
PAINLEVEL_OUTOF10: 4
PAINLEVEL_OUTOF10: 9
PAINLEVEL_OUTOF10: 8
PAINLEVEL_OUTOF10: 9
PAINLEVEL_OUTOF10: 4
PAINLEVEL_OUTOF10: 9
PAINLEVEL_OUTOF10: 3
PAINLEVEL_OUTOF10: 5
PAINLEVEL_OUTOF10: 9
PAINLEVEL_OUTOF10: 4
PAINLEVEL_OUTOF10: 3
PAINLEVEL_OUTOF10: 8

## 2023-12-06 ASSESSMENT — PAIN DESCRIPTION - DESCRIPTORS
DESCRIPTORS: ACHING;TENDER
DESCRIPTORS: ACHING;DISCOMFORT
DESCRIPTORS: ACHING;SHARP
DESCRIPTORS: ACHING
DESCRIPTORS: ACHING;DISCOMFORT

## 2023-12-06 ASSESSMENT — PAIN DESCRIPTION - ONSET
ONSET: ON-GOING

## 2023-12-06 ASSESSMENT — PAIN DESCRIPTION - LOCATION
LOCATION: ABDOMEN

## 2023-12-06 ASSESSMENT — PAIN - FUNCTIONAL ASSESSMENT
PAIN_FUNCTIONAL_ASSESSMENT: PREVENTS OR INTERFERES SOME ACTIVE ACTIVITIES AND ADLS

## 2023-12-06 ASSESSMENT — PAIN DESCRIPTION - FREQUENCY
FREQUENCY: CONTINUOUS

## 2023-12-06 ASSESSMENT — PAIN DESCRIPTION - PAIN TYPE
TYPE: SURGICAL PAIN

## 2023-12-06 NOTE — PLAN OF CARE
Problem: Discharge Planning  Goal: Discharge to home or other facility with appropriate resources  Outcome: Progressing  Flowsheets (Taken 12/6/2023 0119)  Discharge to home or other facility with appropriate resources:   Identify barriers to discharge with patient and caregiver   Arrange for needed discharge resources and transportation as appropriate     Problem: Pain  Goal: Verbalizes/displays adequate comfort level or baseline comfort level  Outcome: Progressing  Flowsheets (Taken 12/6/2023 0119)  Verbalizes/displays adequate comfort level or baseline comfort level:   Encourage patient to monitor pain and request assistance   Assess pain using appropriate pain scale   Administer analgesics based on type and severity of pain and evaluate response     Problem: ABCDS Injury Assessment  Goal: Absence of physical injury  Outcome: Progressing  Flowsheets (Taken 12/6/2023 0119)  Absence of Physical Injury: Implement safety measures based on patient assessment     Problem: Safety - Adult  Goal: Free from fall injury  Outcome: Progressing  Flowsheets (Taken 12/6/2023 0119)  Free From Fall Injury:   Instruct family/caregiver on patient safety   Based on caregiver fall risk screen, instruct family/caregiver to ask for assistance with transferring infant if caregiver noted to have fall risk factors     Problem: Skin/Tissue Integrity - Adult  Goal: Skin integrity remains intact  Outcome: Progressing  Flowsheets (Taken 12/6/2023 0119)  Skin Integrity Remains Intact:   Monitor for areas of redness and/or skin breakdown   Assess vascular access sites hourly  Goal: Incisions, wounds, or drain sites healing without S/S of infection  Outcome: Progressing  Flowsheets (Taken 12/6/2023 0119)  Incisions, Wounds, or Drain Sites Healing Without Sign and Symptoms of Infection: ADMISSION and DAILY: Assess and document risk factors for pressure ulcer development     Problem: Skin/Tissue Integrity  Goal: Absence of new skin

## 2023-12-06 NOTE — PROGRESS NOTES
Pharmacy Consult for TPN/PPN Monitoring & Adjustment  IV Access: central      Dosing weight: 89 kg  Current insulin regimen: low sliding scale  Diet (excluding TPN): NPO  Maintenance fluid: none    Plan:  See components of tonight's TPN bag in the table below:  Date 12/4/2023 12/5/2023 12/6/2023   Total kcal/kg 23 23 23   Total kcal 2047 2047 2047   Protein g/kg 1.7 1.7 1.7   Protein g 151.3 151.3 151.3   Protein kcal (4 kcal/g) 605.2 605.2 605.2   Lipid % 30 30 30   Lipid g 61.4 61.4 61.4   Lipid kcal (10 kcal/g) 614.1 614.1 614.1   Dextrose g 243.4 243.4 243.4   Dextrose kcal (3.4 kcal/g) 827.7 827.7 827.7   Infusion Rate (mL/hr) 115 115 85    Na Acetate (mEq) 40 40 0   Na Chloride (mEq) 280 280 280   Na Phos (mmol)  (3 mmol = 4 mEq Na) 0 0 0   Total Na (mEq) 320 320 280   K Acetate (mEq) 72 72 72   K Chloride (mEq) 0 0 0   K Phos (mmol)  (15 mmol = 22 mEq K) 15 15 15   Total K (mEq) 94 94 94   Ca Gluconate (mEq)  (1 g = 4.65 mEq) 9.3 9.3 9.3   Mag Sulfate (mEq)  (1 g = 8.12 mEq) 8.12 8.12 8.12   Multivitamin (mL) 10 10 10   Trace Elements (mL) 1 1 1      Summary of changes for tonight's TPN:   Decrease TPN rate to 85 ml/hr (Discussed with Dr. Jeramy Astorga) - has been receiving furosemide for edema  Remove Na Acetate     Electrolyte Replacement:   None    Monitoring:  BMP & TG level ordered for tomorrow with AM labs. Pharmacy will continue to follow daily. Thank you for the consult.   Buster Lopez, RockyD, BCPS   12/6/2023  8:26 AM

## 2023-12-06 NOTE — FLOWSHEET NOTE
12/06/23 1117   Safe Environment   Safety Measures Side rails X 2;Call light within reach; Other (comment)  (virtual nurse round, tech at bedside)     VN called into patients room and introduced myself and role. Patient answered and permitted video. Video activated. Patient resting comfortably in bed. Staff in room providing care.

## 2023-12-06 NOTE — CARE COORDINATION
12/6/23, 11:34 AM EST    DISCHARGE ON GOING EVALUATION    [facility] day: 19  Location: [facility]12/012-A Reason for admit: Diverticulitis [K57.92]  Acute diverticulitis [K57.92]   Procedure:   11/17 OR with Dr. Mame Kiran: Robotic Sigmoid Colectomy   11/20 NG placed  11/20 OR with Dr. Mame Kiran: Diagnostic laparoscopy. 11/24 CT A/P: IMPRESSION: Suspect SBO with transition point in the mid central small bowel loops. Numerous scattered pockets of free intraperitoneal air are seen both near the surgical anastomosis and throughout the abdomen which may indicate anastomotic leak. A small pocket of semi organized fluid in the dependent portion of the pelvis mildly enhances peripherally and contains foci of air density which could represent a developing abscess. Trace bilateral pleural effusions and patchy bilateral lower lobe airspace opacities may indicate pneumonia in the appropriate clinical setting. Follow-up to ensure resolution is advised. Diffuse total body wall edema suggesting anasarca is present. Nonspecific bladder wall thickening may be artifactual related to under distention. Correlate with urinalysis. 11/25 OR with Dr. Nellie Stanley: Exploratory laparotomy with colonic anastomotic resection, washout, drain placement x2 and end-colostomy. 11/29 PICC insertion   12/1 NG removed  12/2 IR with Dr. Megha Tay: Cony Lu guided drain placement to right and left sides of abdomen. 12/5 CT A/P: The patient has undergone drainage of the fluid collections in the right and left sides of the abdomen. 2. There is barium within the fluid in the collection along the left lateral abdominal wall. This is suspicious for leakage of barium within bowel loops into the collection and underlying perforation. 3. Drainage catheter in the right flank. The previously noted right fluid collection has resolved. 4. Dilated small bowel loops in the left lower quadrant. 4. Postoperative changes.   5. Diminution in the free fluid in the pelvis since previous study dated 12/2/2023. 6. Bilateral pleural effusions. Areas of atelectasis or infiltrate at both lung bases. Barriers to Discharge: General Surgery following. Dietician. Full liquids yesterday. Decreased to NPO with sips today. IS. TPN. IV pepcid. IV dilaudid prn. IV ativan prn. IV merrem q8h. IV zofran prn. Roxicodone prn. IV lasix complete today. Hgb 8.1 (8.2) WBC 17.8 (19.8) LUQ colostomy with output. LUQ NELY (0 ml out) LLQ Accordion (850 ml out), RLQ Accordion (0 ml out). Penrose drain covered with dry dressing r/t pink tinged; serosanguinous drainage. PCP: Masha CANNON  Readmission Risk Score: 22.4%  Patient Goals/Plan/Treatment Preferences: Kat Carlisle is from home with his parents. Received call from patient's mother Jimi Hector. She is requesting that patient be transferred to [facility]. According to [facility], patient's insurance is in network.  Message sent to Dr. Bayron Araujo to notify of patient/family request.

## 2023-12-06 NOTE — PROGRESS NOTES
(24h): Resp  Av.5  Min: 15  Max: 21  Current Pulse Ox (24h):  SpO2: 94 %  Pulse Ox Range (24h):  SpO2  Av.6 %  Min: 94 %  Max: 97 %  Oxygen Amount and Delivery: O2 Flow Rate (L/min): 3 L/min  Incentive Spirometry Tx: Achieved Volume (mL): 1000 mL (pt could only tolerate 2 x but did cough and deep breathe)  In: 120 [P.O.:120]  Out: 6558 [Urine:5325; Drains:853]  [REMOVED] Closed/Suction Drain RUQ Bulb-Output (ml): 10 ml  Closed/Suction Drain Lateral LUQ Bulb-Output (ml): 0 ml  Open Drain 23 Inferior;Midline Abdomen-Output (ml): 0 ml  Closed/Suction Drain Lateral;Left LLQ Accordion-Output (ml): 300 ml  Closed/Suction Drain Lateral;Right RLQ Accordion-Output (ml): 0 ml  [REMOVED] Closed/Suction Drain Right Abdomen;RLQ Bulb-Output (ml): 60 ml  Date 23 0000 - 23 2359   Shift 0971-4876 4184-2555 7332-4496 24 Hour Total   INTAKE   Shift Total(mL/kg)       OUTPUT   Urine(mL/kg/hr) 1000   1000   Drains(mL/kg) 300(2.3)   300(2.3)   Stool(mL/kg) 80(0.6)   80(0.6)   Shift Total(mL/kg) 1380(10.5)   1380(10.5)   Weight (kg) 131.2 131.2 131.2 131.2       PHYSICAL EXAM     CONSTITUTIONAL: Alert, mild distress/anxiety, cooperative to examination. Ill apperaing   SKIN: Skin color pale. texture, turgor normal. No rashes or lesions. ABDOMEN: Large midline incision with penrose to lower abdomen. . .  The right lower quadrant incision the staples have already been removed and is packed. The right drain is seropurulent the left drain which looks like all enteric contents 750 cc  NEUROLOGIC: There are no focalizing motor or sensory deficits. EXTREMITIES: no cyanosis, no clubbing. Edema to abdomen/flank. No lower extremity edema.      LABS     Recent Labs     23  0839 23  0400 23  1000 23  0700 23  0513   WBC 20.8* 18.6*  --  19.8*  --    HGB 7.1* 6.6* 6.5* 8.2*  --    HCT 22.0* 21.1* 19.9* 26.4*  --    * 432*  --  495*  --    * 131*  --  132* 132*   K 3.8 3.9 --  3.8 4.0   CL 99 99  --  98 99   CO2 28 26  --  25 26   BUN 11 9  --  10 11   CREATININE 0.6 0.6  --  0.6 0.5   MG 2.0 2.1  --   --  2.1   PHOS 3.7 4.0  --  3.7 4.0   CALCIUM 7.6* 7.5*  --  7.9* 7.9*      No results for input(s): \"PTT\", \"INR\" in the last 72 hours. Invalid input(s): \"PT\"  Recent Labs     12/04/23  0400 12/06/23  0513   AST 26 26   ALT 24 31   BILITOT 1.0 0.9   BILIDIR 0.6* 0.5*     No results for input(s): \"TROPONINT\" in the last 72 hours. RADIOLOGY    12/5  Narrative & Impression  PROCEDURE: CT ABDOMEN PELVIS W IV CONTRAST     CLINICAL INFORMATION: ir drain left side now with enetric colored drainage, TRAUMA . COMPARISON: CT scan of the abdomen and pelvis dated 12/2/2023. TECHNIQUE: Axial 5 mm CT images were obtained through the abdomen and pelvis after the administration of intravenous contrast. Coronal and sagittal reconstructions were obtained. All CT scans at this facility use dose modulation, iterative reconstruction, and/or weight-based dosing when appropriate to reduce radiation dose to as low as reasonably achievable. FINDINGS:        There are bilateral pleural effusions and areas of atelectasis or infiltrate at both lung bases. .   The base of the heart is within appropriate limits. There is diffuse fatty replacement in the liver. . There is mild splenomegaly. . The adrenals and pancreas are normal. The gallbladder is normal.    There is no hydronephrosis or stones of either kidney. No renal masses are noted. There are postoperative changes in the abdomen. There is an ostomy in the left lower quadrant. The patient has undergone drainage of the fluid collection along the left lateral abdominal wall. There is contrast within this fluid collection. This   indicates leakage of barium from bowel loops into this collection and an underlying perforation     There is a drainage catheter along the right flank. The previously noted fluid collection has resolved.      There

## 2023-12-06 NOTE — FLOWSHEET NOTE
12/06/23 2406   Safe Environment   Safety Measures Call light within reach; Bed in low position;Side rails X 2  (virtual nurse safety round)     VN called into patients room and introduced myself and role. Patient answered and permitted video. Video activated. Patient resting comfortably in bed. Patient voiced no needs or concerns at this time. Pt denies pain. VN educated pt on utilizing call light if condition changes. Call light within reach.

## 2023-12-06 NOTE — PROGRESS NOTES
Radiology sending [facility] all CTs from [facility] per transfer center request.

## 2023-12-06 NOTE — PROGRESS NOTES
Comprehensive Nutrition Assessment    Type and Reason for Visit:  Reassess    Nutrition Recommendations/Plan:   When next 3 in 1 TPN bag is made, suggest continue dosing wt: 89 kgm, 23 kcals/kgm, 1.7 gm protein/kgm and 30% kcals from lipids. Suggest triglyceride level. Discussed with [facility] pharmacist.   Continue NPO per physician orders. Allergy to pineapple & shellfish noted. Suggest weigh pt as able. Malnutrition Assessment:  Malnutrition Status: At risk for malnutrition (Comment) (12/04/23 1124)    Context:  Acute Illness     Findings of the 6 clinical characteristics of malnutrition:  Energy Intake:   (TPN initiated 11/25, prior~ minimal intake x 7 days))  Weight Loss:  Unable to assess (unable to assess d/t fluid retention/edema)     Body Fat Loss:  Mild body fat loss Orbital   Muscle Mass Loss:  No significant muscle mass loss    Fluid Accumulation:  Mild Extremities, Generalized   Strength:  Not Performed    Nutrition Assessment:      Pt improving from a nutritional standpoint AEB TPN meeting estimated nutritional needs. At risk for nutrition compromise r/t altered GI function - admit with diverticulitis, 11/17/23 sigmoid colectomy, 11/21 diagnostic laparoscopy, 11/24 colonic anastomotic resection and end colostomy, NPO status (TPN initiated 11/25), PICC placed 11/29, milky drainage~monitor for chylous leak, and underlying medical condition (POTS, PTSD, HTN, vapes nicotine     Nutrition Related Findings:    Pt. Report/Treatments/Miscellaneous: Pt seen. 3 in 1 TPN infusing 115ml/hr. Pt denies nausea. Dr Stuart Branham noted (12/6) \"Yesterday noted enteric contents coming out of the left flank drain and the drain appears to be pulled back so not sure if small intestine was accessed during placement and the drain pulled back caused it to the leak or what the etiology of his leak is but the bag was replaced and he had 750 cc of the same liquid enteric contents in the bag this morning.   On review of the

## 2023-12-06 NOTE — PROGRESS NOTES
Per patient and family request we contacted [facility] for transfer. I just received word that the patient was accepted by Dr Barbra Pena.     Awaiting a bed at this time

## 2023-12-07 LAB — GLUCOSE BLD STRIP.AUTO-MCNC: 130 MG/DL (ref 70–108)

## 2023-12-07 PROCEDURE — 82948 REAGENT STRIP/BLOOD GLUCOSE: CPT

## 2023-12-07 NOTE — PROGRESS NOTES
Report given for [facility]. All questions answered.

## 2023-12-07 NOTE — CARE COORDINATION
12/7/23, 7:09 AM EST    Patient goals/plan/ treatment preferences discussed by  and . Patient goals/plan/ treatment preferences reviewed with patient/ family. Patient/ family verbalize understanding of discharge plan and are in agreement with goal/plan/treatment preferences. Understanding was demonstrated using the teach back method. AVS provided by RN at time of discharge, which includes all necessary medical information pertaining to the patients current course of illness, treatment, post-discharge goals of care, and treatment preferences.      Services At/After Discharge: Acute Hospital    Patient transferrd to [address] [place] [address] room 712 via [facility] this AM.

## 2023-12-07 NOTE — PROGRESS NOTES
Transfer of patient to [facility] and spine Med-Surg room 712. Patient left with cell phone and . Patients belongings sent home with patients sister. Family aware of patient departure to [facility]. Vitals stable. Patient left [id number] with LACP. Attempted report. Receiving floor will call back for report.

## 2023-12-16 NOTE — DISCHARGE SUMMARY
07/07/2023    Postural orthostatic tachycardia syndrome (POTS) [G90. A] 07/07/2023    Post traumatic stress disorder (PTSD) [F43.10] 07/07/2023         Stability: Stable       Wound Care: Daily drain care              Time Spent: 30 minutes      Josep Yoon MD, MD [facility] 12/16/2023 7:54 AM

## (undated) DEVICE — GLOVE ORANGE PI 8   MSG9080

## (undated) DEVICE — GOWN,SIRUS,NON REINFRCD,LARGE,SET IN SL: Brand: MEDLINE

## (undated) DEVICE — PUMP SUC IRR TBNG L10FT W/ HNDPC ASSEMB STRYKEFLOW 2

## (undated) DEVICE — 35 ML SYRINGE LUER-LOCK TIP: Brand: MONOJECT

## (undated) DEVICE — SEAL

## (undated) DEVICE — SET UP: Brand: MEDLINE INDUSTRIES, INC.

## (undated) DEVICE — PENCIL SMK EVAC ALL IN 1 DSGN ENH VISIBILITY IMPROVED AIR

## (undated) DEVICE — SUTURE VCRL + SZ 0 L27IN ABSRB VLT L36MM CT-1 1/2 CIR VCPB260H

## (undated) DEVICE — SPONGE GZ W4XL4IN COT 12 PLY TYP VII WVN C FLD DSGN STERILE

## (undated) DEVICE — GENERAL LAPAROSCOPY-LF: Brand: MEDLINE INDUSTRIES, INC.

## (undated) DEVICE — SPONGE LAP W18XL18IN WHT COT 4 PLY FLD STRUNG RADPQ DISP ST 2 PER PACK

## (undated) DEVICE — SUTURE VCRL + SZ 4-0 L27IN ABSRB WHT FS-2 3/8 CIR REV CUT VCP422H

## (undated) DEVICE — PAD PT POS 36 IN SURGYPAD DISP

## (undated) DEVICE — RELOAD STPL L45MM H2-4.1MM THCK TISS GRN GRIPPING SURF B

## (undated) DEVICE — TROCAR: Brand: KII FIOS FIRST ENTRY

## (undated) DEVICE — CLEAN CLOSURE PACK: Brand: MEDLINE INDUSTRIES, INC.

## (undated) DEVICE — SUTURE VCRL + SZ 2-0 L27IN ABSRB VLT UR-6 5/8 CIR TAPR PNT VCP602H

## (undated) DEVICE — TUBING INSUFFLATOR HEAT HUMIDIFIED SMK EVAC SET PNEUMOCLEAR

## (undated) DEVICE — 1LYRTR 16FR10ML100%SILTMPS SNP: Brand: MEDLINE INDUSTRIES, INC.

## (undated) DEVICE — STAPLER 60MM POWERED ECHELON 3000 LONG 440MM

## (undated) DEVICE — SLIPCOVER HUG A VAC

## (undated) DEVICE — APPLICATOR PREP 26ML 0.7% IOD POVACRYLEX 74% ISO ALC ST

## (undated) DEVICE — PREMIUM DRY TRAY LF: Brand: MEDLINE INDUSTRIES, INC.

## (undated) DEVICE — GLOVE ORANGE PI 7   MSG9070

## (undated) DEVICE — Device: Brand: SENSURA MIO

## (undated) DEVICE — PENCIL SMK EVAC 15FT BLADE ELECTRD ROCKER F/TELSCP

## (undated) DEVICE — RELOAD STPL H4.1X2MM DIA60MM THCK TISS GRN 6 ROW PWR GST B

## (undated) DEVICE — SEALANT TISS 10 CC FOR HUM FIBRIN VISTASEAL

## (undated) DEVICE — SUTURE PERMAHAND SZ 2-0 L18IN NONABSORBABLE BLK L26MM SH C012D

## (undated) DEVICE — BASIC SINGLE BASIN BTC-LF: Brand: MEDLINE INDUSTRIES, INC.

## (undated) DEVICE — COLUMN DRAPE

## (undated) DEVICE — APPLICATOR LAP 45CM FLX 2 VISTASEAL

## (undated) DEVICE — SUTURE PERMA-HAND SZ 2-0 L30IN NONABSORBABLE BLK L26MM SH K833H

## (undated) DEVICE — Device

## (undated) DEVICE — SUTURE NOVAFIL SZ 0 L30IN NONABSORBABLE BLU GS-21 MFIL 8886445561

## (undated) DEVICE — GLOVE ORTHO 8   MSG9480

## (undated) DEVICE — BREAST HERNIA: Brand: MEDLINE INDUSTRIES, INC.

## (undated) DEVICE — HYPODERMIC SAFETY NEEDLE: Brand: MAGELLAN

## (undated) DEVICE — SUTURE STRATAFIX SYMMETRIC SZ 1 L18IN ABSRB VLT CT1 L36CM SXPP1A404

## (undated) DEVICE — TROCAR: Brand: KII SHIELDED BLADED ACCESS SYSTEM

## (undated) DEVICE — BANDAGE ADH W1XL3IN NAT FAB WVN FLX DURABLE N ADH PD SEAL

## (undated) DEVICE — GAUZE,SPONGE,8"X4",12PLY,XRAY,STRL,LF: Brand: MEDLINE

## (undated) DEVICE — WOUND RETRACTOR AND PROTECTOR: Brand: ALEXIS O WOUND PROTECTOR-RETRACTOR

## (undated) DEVICE — GLOVE ORANGE PI 7 1/2   MSG9075

## (undated) DEVICE — SUTURE VCRL + SZ 0 L18IN ABSRB TIE VCP106G

## (undated) DEVICE — TIP COVER ACCESSORY

## (undated) DEVICE — PACK,LAPAROSCOPY,PK II,SIRUS: Brand: MEDLINE

## (undated) DEVICE — ARM DRAPE

## (undated) DEVICE — EVACUATOR SURG 100CC SIL BLB SUCT RESVR FOR CLS WND DRNGE

## (undated) DEVICE — ELECTRO LUBE IS A SINGLE PATIENT USE DEVICE THAT IS INTENDED TO BE USED ON ELECTROSURGICAL ELECTRODES TO REDUCE STICKING.: Brand: KEY SURGICAL ELECTRO LUBE

## (undated) DEVICE — STAPLER INT L28CM DIA29MM CLS STPL H10-2.5MM OPN LEG L5.5MM

## (undated) DEVICE — DRAIN SURG 19FR 0.25IN SIL RND W/ TRCR INDIC DOT RADPQ FULL

## (undated) DEVICE — DRAPE,ROBOTICS,STERILE: Brand: MEDLINE

## (undated) DEVICE — CONTAINER,SPECIMEN,PNEU TUBE,4OZ,OR STRL: Brand: MEDLINE

## (undated) DEVICE — VESSEL SEALER EXTEND: Brand: ENDOWRIST

## (undated) DEVICE — SPONGE DRN W4XL4IN RAYON/POLYESTER 6 PLY NONWOVEN PRECUT 2 PER PK

## (undated) DEVICE — SUTURE VCRL + SZ 3-0 L27IN ABSRB UD L26MM SH 1/2 CIR VCP416H

## (undated) DEVICE — LIQUIBAND RAPID ADHESIVE 36/CS 0.8ML: Brand: MEDLINE

## (undated) DEVICE — SUTURE PROL SZ 0 L30IN NONABSORBABLE BLU L36MM CT-1 1/2 CIR 8424H

## (undated) DEVICE — YANKAUER,POOLE TIP,STERILE,50/CS: Brand: MEDLINE

## (undated) DEVICE — SOLUTION IRRIG 1000ML STRL H2O USP PLAS POUR BTL

## (undated) DEVICE — STAPLER ECHELON 3000 45MM STANDARD

## (undated) DEVICE — TUBING INSUFFLATION SMK EVAC HI FLO SET PNEUMOCLEAR

## (undated) DEVICE — SUTURE VCRL SZ 3-0 L18IN ABSRB VLT L26MM SH 1/2 CIR J774D